# Patient Record
Sex: MALE | Race: WHITE | Employment: FULL TIME | URBAN - METROPOLITAN AREA
[De-identification: names, ages, dates, MRNs, and addresses within clinical notes are randomized per-mention and may not be internally consistent; named-entity substitution may affect disease eponyms.]

---

## 2017-02-10 ENCOUNTER — OFFICE VISIT (OUTPATIENT)
Dept: FAMILY MEDICINE CLINIC | Age: 22
End: 2017-02-10

## 2017-02-10 VITALS
HEIGHT: 68 IN | SYSTOLIC BLOOD PRESSURE: 128 MMHG | DIASTOLIC BLOOD PRESSURE: 72 MMHG | WEIGHT: 160 LBS | HEART RATE: 84 BPM | BODY MASS INDEX: 24.25 KG/M2

## 2017-02-10 DIAGNOSIS — J21.9 ACUTE BRONCHIOLITIS DUE TO UNSPECIFIED ORGANISM: Primary | ICD-10-CM

## 2017-02-10 PROCEDURE — 99213 OFFICE O/P EST LOW 20 MIN: CPT | Performed by: FAMILY MEDICINE

## 2017-02-10 RX ORDER — BENZONATATE 200 MG/1
200 CAPSULE ORAL EVERY 8 HOURS PRN
Qty: 30 CAPSULE | Refills: 0 | Status: SHIPPED | OUTPATIENT
Start: 2017-02-10 | End: 2017-02-17

## 2017-02-10 RX ORDER — AZITHROMYCIN 250 MG/1
TABLET, FILM COATED ORAL
Qty: 1 PACKET | Refills: 0 | Status: SHIPPED | OUTPATIENT
Start: 2017-02-10 | End: 2017-05-31

## 2017-02-10 RX ORDER — METHYLPREDNISOLONE 4 MG/1
TABLET ORAL
Qty: 1 KIT | Refills: 0 | Status: SHIPPED | OUTPATIENT
Start: 2017-02-10 | End: 2017-11-08

## 2017-05-30 LAB — S PYO AG THROAT QL: NORMAL

## 2017-05-31 ENCOUNTER — OFFICE VISIT (OUTPATIENT)
Dept: FAMILY MEDICINE CLINIC | Age: 22
End: 2017-05-31

## 2017-05-31 VITALS
HEIGHT: 69 IN | TEMPERATURE: 98.4 F | DIASTOLIC BLOOD PRESSURE: 72 MMHG | WEIGHT: 155 LBS | SYSTOLIC BLOOD PRESSURE: 121 MMHG | BODY MASS INDEX: 22.96 KG/M2 | HEART RATE: 80 BPM | RESPIRATION RATE: 20 BRPM

## 2017-05-31 DIAGNOSIS — J03.90 ACUTE TONSILLITIS, UNSPECIFIED ETIOLOGY: ICD-10-CM

## 2017-05-31 DIAGNOSIS — J02.9 SORE THROAT: Primary | ICD-10-CM

## 2017-05-31 PROCEDURE — 87880 STREP A ASSAY W/OPTIC: CPT | Performed by: FAMILY MEDICINE

## 2017-05-31 PROCEDURE — 99213 OFFICE O/P EST LOW 20 MIN: CPT | Performed by: FAMILY MEDICINE

## 2017-05-31 RX ORDER — AZITHROMYCIN 250 MG/1
TABLET, FILM COATED ORAL
Qty: 1 PACKET | Refills: 0 | Status: SHIPPED | OUTPATIENT
Start: 2017-05-31 | End: 2017-06-10

## 2017-05-31 ASSESSMENT — PATIENT HEALTH QUESTIONNAIRE - PHQ9
SUM OF ALL RESPONSES TO PHQ9 QUESTIONS 1 & 2: 0
1. LITTLE INTEREST OR PLEASURE IN DOING THINGS: 0
2. FEELING DOWN, DEPRESSED OR HOPELESS: 0
SUM OF ALL RESPONSES TO PHQ QUESTIONS 1-9: 0

## 2017-10-09 ENCOUNTER — OFFICE VISIT (OUTPATIENT)
Dept: FAMILY MEDICINE CLINIC | Age: 22
End: 2017-10-09

## 2017-10-09 VITALS
SYSTOLIC BLOOD PRESSURE: 120 MMHG | BODY MASS INDEX: 24.14 KG/M2 | DIASTOLIC BLOOD PRESSURE: 79 MMHG | HEIGHT: 69 IN | HEART RATE: 103 BPM | WEIGHT: 163 LBS | TEMPERATURE: 99.7 F

## 2017-10-09 DIAGNOSIS — J01.90 ACUTE BACTERIAL SINUSITIS: Primary | ICD-10-CM

## 2017-10-09 DIAGNOSIS — B96.89 ACUTE BACTERIAL SINUSITIS: Primary | ICD-10-CM

## 2017-10-09 PROCEDURE — 99213 OFFICE O/P EST LOW 20 MIN: CPT | Performed by: FAMILY MEDICINE

## 2017-10-09 RX ORDER — BENZONATATE 200 MG/1
200 CAPSULE ORAL 3 TIMES DAILY PRN
Qty: 20 CAPSULE | Refills: 0 | Status: SHIPPED | OUTPATIENT
Start: 2017-10-09 | End: 2017-10-16

## 2017-10-09 RX ORDER — METHYLPREDNISOLONE 4 MG/1
TABLET ORAL
Qty: 1 KIT | Refills: 0 | Status: SHIPPED | OUTPATIENT
Start: 2017-10-09 | End: 2017-11-08

## 2017-10-09 NOTE — PROGRESS NOTES
Chief Complaint   Patient presents with    Congestion    Cough    Headache       HPI: Corrine Hemphill presents for evaluation and management of 3 day h/o sinus pressure, chills, cough and fever. Called the service yesterday and got an rx for zithromax. Still not better. ROS: , no chest pain, no palpitation, no abd pain, no n/v/d/c,    No Known Allergies  New Prescriptions    METHYLPREDNISOLONE (MEDROL DOSEPACK) 4 MG TABLET    Take by mouth. Current Outpatient Prescriptions   Medication Sig Dispense Refill    methylPREDNISolone (MEDROL DOSEPACK) 4 MG tablet Take by mouth. 1 kit 0    methylPREDNISolone (MEDROL DOSEPACK) 4 MG tablet Take by mouth. 1 kit 0    cetirizine (ZYRTEC) 10 MG tablet Take 10 mg by mouth daily. No current facility-administered medications for this visit. Past Medical History:   Diagnosis Date    Allergic rhinitis, seasonal     Conjunctivitis, allergic          Objective   /79   Pulse 103   Temp 99.7 °F (37.6 °C) (Oral)   Ht 5' 8.5\" (1.74 m)   Wt 163 lb (73.9 kg)   BMI 24.42 kg/m²   Wt Readings from Last 3 Encounters:   10/09/17 163 lb (73.9 kg)   05/31/17 155 lb (70.3 kg)   02/10/17 160 lb (72.6 kg) (58 %, Z= 0.19)*     * Growth percentiles are based on CDC 2-20 Years data. WDWN in NAD  HEENT: NCAT, PERRL, TM's neg, Canals patent, Nares mod ertyehma and Patent, Op/OC: beefy red and swollen tonsils with good dentition  Lungs: CTAB BS Equal and Easy, no accessory muscle use  CV: RRR w/o M,R,G, PP2+, no edema      Assessment   Plan     1. Acute bacterial sinusitis  Will add medrol and recheck inb 1 week. - methylPREDNISolone (MEDROL DOSEPACK) 4 MG tablet; Take by mouth. Dispense: 1 kit; Refill: 0      RTC inb 1 week.

## 2017-11-08 ENCOUNTER — OFFICE VISIT (OUTPATIENT)
Dept: FAMILY MEDICINE CLINIC | Age: 22
End: 2017-11-08

## 2017-11-08 VITALS
HEART RATE: 118 BPM | WEIGHT: 160 LBS | SYSTOLIC BLOOD PRESSURE: 130 MMHG | TEMPERATURE: 101.9 F | HEIGHT: 69 IN | BODY MASS INDEX: 23.7 KG/M2 | DIASTOLIC BLOOD PRESSURE: 85 MMHG

## 2017-11-08 DIAGNOSIS — J02.0 STREP THROAT: Primary | ICD-10-CM

## 2017-11-08 PROCEDURE — G8420 CALC BMI NORM PARAMETERS: HCPCS | Performed by: FAMILY MEDICINE

## 2017-11-08 PROCEDURE — 1036F TOBACCO NON-USER: CPT | Performed by: FAMILY MEDICINE

## 2017-11-08 PROCEDURE — G8484 FLU IMMUNIZE NO ADMIN: HCPCS | Performed by: FAMILY MEDICINE

## 2017-11-08 PROCEDURE — 99213 OFFICE O/P EST LOW 20 MIN: CPT | Performed by: FAMILY MEDICINE

## 2017-11-08 PROCEDURE — G8427 DOCREV CUR MEDS BY ELIG CLIN: HCPCS | Performed by: FAMILY MEDICINE

## 2017-11-08 RX ORDER — AZITHROMYCIN 250 MG/1
TABLET, FILM COATED ORAL
Qty: 1 PACKET | Refills: 0 | Status: SHIPPED | OUTPATIENT
Start: 2017-11-08 | End: 2018-08-01 | Stop reason: SDUPTHER

## 2017-11-08 RX ORDER — METHYLPREDNISOLONE 4 MG/1
TABLET ORAL
Qty: 1 KIT | Refills: 0 | Status: SHIPPED | OUTPATIENT
Start: 2017-11-08 | End: 2018-08-31 | Stop reason: CLARIF

## 2017-11-08 NOTE — PROGRESS NOTES
Chief Complaint   Patient presents with    Cough    Fever       HPI: Carole Duggan presents for evaluation and management of 2 day h/o sore throat with fever to 101 and myalgia. Denies much cough or rash. ROS: +  chills, no cough, no sob, no chest pain, no palpitation, no abd pain, no n/v/d/c,    No Known Allergies  New Prescriptions    AZITHROMYCIN (ZITHROMAX) 250 MG TABLET    2 today then one a day for 4 more days. METHYLPREDNISOLONE (MEDROL DOSEPACK) 4 MG TABLET    Take by mouth. Current Outpatient Prescriptions   Medication Sig Dispense Refill    azithromycin (ZITHROMAX) 250 MG tablet 2 today then one a day for 4 more days. 1 packet 0    methylPREDNISolone (MEDROL DOSEPACK) 4 MG tablet Take by mouth. 1 kit 0    cetirizine (ZYRTEC) 10 MG tablet Take 10 mg by mouth daily. No current facility-administered medications for this visit. Past Medical History:   Diagnosis Date    Allergic rhinitis, seasonal     Conjunctivitis, allergic          Objective   /85   Pulse 118   Temp 101.9 °F (38.8 °C) (Oral)   Ht 5' 8.5\" (1.74 m)   Wt 160 lb (72.6 kg)   BMI 23.97 kg/m²   Wt Readings from Last 3 Encounters:   11/08/17 160 lb (72.6 kg)   10/09/17 163 lb (73.9 kg)   05/31/17 155 lb (70.3 kg)       WDWN in NAD  HEENT: NCAT, PERRL, TM's neg, Canals patent, Nares pink and Patent, Op/OC: beefy red pharynx with tonsillar swelling with good dentition  Lungs: CTAB BS Equal and Easy, no accessory muscle use  CV: RRR w/o M,R,G, PP2+, no edema      Assessment   Plan     1. Strep throat  Will tx with meds and follow up inb 1 week. - azithromycin (ZITHROMAX) 250 MG tablet; 2 today then one a day for 4 more days. Dispense: 1 packet; Refill: 0  - methylPREDNISolone (MEDROL DOSEPACK) 4 MG tablet; Take by mouth. Dispense: 1 kit;  Refill: 0      RTC prn

## 2018-08-01 ENCOUNTER — TELEPHONE (OUTPATIENT)
Dept: FAMILY MEDICINE CLINIC | Age: 23
End: 2018-08-01

## 2018-08-01 DIAGNOSIS — J02.0 STREP THROAT: ICD-10-CM

## 2018-08-01 RX ORDER — AZITHROMYCIN 250 MG/1
TABLET, FILM COATED ORAL
Qty: 1 PACKET | Refills: 0 | Status: SHIPPED | OUTPATIENT
Start: 2018-08-01 | End: 2018-08-31 | Stop reason: CLARIF

## 2018-08-01 NOTE — TELEPHONE ENCOUNTER
Please call: I sent in a Z-Luis Fernando for him to the pharmacy as requested.  If he is not better in 3-5 days he needs to make an appointment

## 2018-08-15 ENCOUNTER — TELEPHONE (OUTPATIENT)
Dept: FAMILY MEDICINE CLINIC | Age: 23
End: 2018-08-15

## 2018-08-15 RX ORDER — PREDNISONE 10 MG/1
TABLET ORAL
Qty: 30 TABLET | Refills: 0 | Status: SHIPPED | OUTPATIENT
Start: 2018-08-15 | End: 2018-08-25

## 2018-08-17 ENCOUNTER — TELEPHONE (OUTPATIENT)
Dept: FAMILY MEDICINE CLINIC | Age: 23
End: 2018-08-17

## 2018-08-27 ENCOUNTER — TELEPHONE (OUTPATIENT)
Dept: FAMILY MEDICINE CLINIC | Age: 23
End: 2018-08-27

## 2018-08-27 NOTE — TELEPHONE ENCOUNTER
The PT's father is calling in wanting to know sif he can be squeezed onto the schedule for Friday. \"At the moment it's blocked out\"    The Pt will be coming in from school \"[place]\" Friday and would be able to be here after 12:00pm    He is wanting to talk to the Dr about possible ADD/ADHD Issues.           Best call back [phone]--[phone]

## 2018-08-31 ENCOUNTER — OFFICE VISIT (OUTPATIENT)
Dept: FAMILY MEDICINE CLINIC | Age: 23
End: 2018-08-31

## 2018-08-31 VITALS
SYSTOLIC BLOOD PRESSURE: 102 MMHG | BODY MASS INDEX: 24.88 KG/M2 | WEIGHT: 168 LBS | HEIGHT: 69 IN | DIASTOLIC BLOOD PRESSURE: 66 MMHG | HEART RATE: 75 BPM

## 2018-08-31 DIAGNOSIS — F90.0 ATTENTION DEFICIT HYPERACTIVITY DISORDER (ADHD), PREDOMINANTLY INATTENTIVE TYPE: ICD-10-CM

## 2018-08-31 PROCEDURE — 1036F TOBACCO NON-USER: CPT | Performed by: FAMILY MEDICINE

## 2018-08-31 PROCEDURE — G8419 CALC BMI OUT NRM PARAM NOF/U: HCPCS | Performed by: FAMILY MEDICINE

## 2018-08-31 PROCEDURE — G8427 DOCREV CUR MEDS BY ELIG CLIN: HCPCS | Performed by: FAMILY MEDICINE

## 2018-08-31 PROCEDURE — 99213 OFFICE O/P EST LOW 20 MIN: CPT | Performed by: FAMILY MEDICINE

## 2018-08-31 RX ORDER — METHYLPHENIDATE HYDROCHLORIDE 10 MG/1
10 TABLET ORAL 2 TIMES DAILY
Qty: 60 TABLET | Refills: 0 | Status: SHIPPED | OUTPATIENT
Start: 2018-08-31 | End: 2018-09-24

## 2018-08-31 ASSESSMENT — ENCOUNTER SYMPTOMS
ABDOMINAL PAIN: 0
VOMITING: 0
DIARRHEA: 0
CONSTIPATION: 0
COUGH: 0
NAUSEA: 0
SHORTNESS OF BREATH: 0

## 2018-08-31 ASSESSMENT — PATIENT HEALTH QUESTIONNAIRE - PHQ9
SUM OF ALL RESPONSES TO PHQ9 QUESTIONS 1 & 2: 0
SUM OF ALL RESPONSES TO PHQ QUESTIONS 1-9: 0
2. FEELING DOWN, DEPRESSED OR HOPELESS: 0
1. LITTLE INTEREST OR PLEASURE IN DOING THINGS: 0
SUM OF ALL RESPONSES TO PHQ QUESTIONS 1-9: 0

## 2018-09-24 ENCOUNTER — OFFICE VISIT (OUTPATIENT)
Dept: FAMILY MEDICINE CLINIC | Age: 23
End: 2018-09-24
Payer: COMMERCIAL

## 2018-09-24 VITALS
SYSTOLIC BLOOD PRESSURE: 111 MMHG | HEART RATE: 78 BPM | DIASTOLIC BLOOD PRESSURE: 74 MMHG | WEIGHT: 165 LBS | HEIGHT: 68 IN | BODY MASS INDEX: 25.01 KG/M2

## 2018-09-24 DIAGNOSIS — F90.0 ATTENTION DEFICIT HYPERACTIVITY DISORDER (ADHD), PREDOMINANTLY INATTENTIVE TYPE: Primary | ICD-10-CM

## 2018-09-24 PROCEDURE — G8427 DOCREV CUR MEDS BY ELIG CLIN: HCPCS | Performed by: FAMILY MEDICINE

## 2018-09-24 PROCEDURE — G8419 CALC BMI OUT NRM PARAM NOF/U: HCPCS | Performed by: FAMILY MEDICINE

## 2018-09-24 PROCEDURE — 99213 OFFICE O/P EST LOW 20 MIN: CPT | Performed by: FAMILY MEDICINE

## 2018-09-24 PROCEDURE — 1036F TOBACCO NON-USER: CPT | Performed by: FAMILY MEDICINE

## 2018-09-24 RX ORDER — METHYLPHENIDATE HYDROCHLORIDE 20 MG/1
20 TABLET ORAL 2 TIMES DAILY
Qty: 60 TABLET | Refills: 0 | Status: SHIPPED | OUTPATIENT
Start: 2018-09-24 | End: 2018-11-05 | Stop reason: SDUPTHER

## 2018-09-24 ASSESSMENT — ENCOUNTER SYMPTOMS
ABDOMINAL PAIN: 0
SHORTNESS OF BREATH: 0
COUGH: 0
CONSTIPATION: 0
VOMITING: 0
DIARRHEA: 0
NAUSEA: 0

## 2018-09-24 NOTE — PROGRESS NOTES
Chief Complaint   Patient presents with    ADD         HPI:  Ronald Rose is a 26 y.o. (: 1995) here today   for follow up of his ADHD. He is  compliant with his ADD medications  with decreased appetite, . Medications are somewhat effective for his symptoms he still feels at times he is to extra hyper. He said that he doesn't feel as though the medication is effective if he is already over excited and hyper before taking the medication. He says that his school performance is improved though. He is working out so the weight loss he has had is intentional.      Review of Systems   Constitutional: Negative for chills and fever. Respiratory: Negative for cough and shortness of breath. Cardiovascular: Negative for chest pain and palpitations. Gastrointestinal: Negative for abdominal pain, constipation, diarrhea, nausea and vomiting. Endocrine: Negative for polyuria. Genitourinary: Negative for dysuria. Psychiatric/Behavioral: Negative for dysphoric mood, self-injury and suicidal ideas. The patient is not nervous/anxious.         Past Medical History:   Diagnosis Date    Allergic rhinitis, seasonal     Attention deficit hyperactivity disorder (ADHD), predominantly inattentive type 2018    Conjunctivitis, allergic      Family History   Problem Relation Age of Onset    No Known Problems Mother     Hypertension Father      Social History     Social History    Marital status: Single     Spouse name: N/A    Number of children: N/A    Years of education: N/A     Occupational History    Student - construction      Social History Main Topics    Smoking status: Never Smoker    Smokeless tobacco: Never Used      Comment: counseled on tobacco exposure avoidance    Alcohol use 0.0 oz/week      Comment: Rarely    Drug use: No    Sexual activity: No     Other Topics Concern    Not on file     Social History Narrative    No narrative on file       New Prescriptions    METHYLPHENIDATE

## 2018-11-05 DIAGNOSIS — F90.0 ATTENTION DEFICIT HYPERACTIVITY DISORDER (ADHD), PREDOMINANTLY INATTENTIVE TYPE: ICD-10-CM

## 2018-11-06 RX ORDER — METHYLPHENIDATE HYDROCHLORIDE 20 MG/1
20 TABLET ORAL 2 TIMES DAILY
Qty: 60 TABLET | Refills: 0 | Status: SHIPPED | OUTPATIENT
Start: 2018-11-06 | End: 2018-12-10 | Stop reason: SDUPTHER

## 2018-11-08 ENCOUNTER — OFFICE VISIT (OUTPATIENT)
Dept: FAMILY MEDICINE CLINIC | Age: 23
End: 2018-11-08
Payer: COMMERCIAL

## 2018-11-08 VITALS
BODY MASS INDEX: 25.31 KG/M2 | HEIGHT: 68 IN | WEIGHT: 167 LBS | HEART RATE: 79 BPM | DIASTOLIC BLOOD PRESSURE: 65 MMHG | SYSTOLIC BLOOD PRESSURE: 109 MMHG

## 2018-11-08 DIAGNOSIS — F90.0 ATTENTION DEFICIT HYPERACTIVITY DISORDER (ADHD), PREDOMINANTLY INATTENTIVE TYPE: ICD-10-CM

## 2018-11-08 PROCEDURE — G8419 CALC BMI OUT NRM PARAM NOF/U: HCPCS | Performed by: FAMILY MEDICINE

## 2018-11-08 PROCEDURE — 1036F TOBACCO NON-USER: CPT | Performed by: FAMILY MEDICINE

## 2018-11-08 PROCEDURE — 99213 OFFICE O/P EST LOW 20 MIN: CPT | Performed by: FAMILY MEDICINE

## 2018-11-08 PROCEDURE — G8427 DOCREV CUR MEDS BY ELIG CLIN: HCPCS | Performed by: FAMILY MEDICINE

## 2018-11-08 PROCEDURE — G8484 FLU IMMUNIZE NO ADMIN: HCPCS | Performed by: FAMILY MEDICINE

## 2018-11-08 ASSESSMENT — ENCOUNTER SYMPTOMS
COUGH: 0
DIARRHEA: 0
CONSTIPATION: 0
NAUSEA: 0
SHORTNESS OF BREATH: 0
ABDOMINAL PAIN: 0
VOMITING: 0

## 2018-11-08 NOTE — PROGRESS NOTES
Prescriptions    No medications on file         Meds Prior to visit:  Prior to Visit Medications    Medication Sig Taking? Authorizing Provider   methylphenidate (RITALIN) 20 MG tablet Take 1 tablet by mouth 2 times daily for 30 days. Tarah Calloway MD   cetirizine (ZYRTEC) 10 MG tablet Take 10 mg by mouth daily. Yes Historical Provider, MD     No Known Allergies    OBJECTIVE:    /65   Pulse 79   Ht 5' 8\" (1.727 m)   Wt 167 lb (75.8 kg)   BMI 25.39 kg/m²   BP Readings from Last 2 Encounters:   11/08/18 109/65   09/24/18 111/74     Wt Readings from Last 3 Encounters:   11/08/18 167 lb (75.8 kg)   09/24/18 165 lb (74.8 kg)   08/31/18 168 lb (76.2 kg)       Physical Exam   Constitutional: He appears well-developed and well-nourished. Cardiovascular: Normal rate and regular rhythm. Exam reveals no gallop and no friction rub. No murmur heard. Pulses:       Dorsalis pedis pulses are 2+ on the right side, and 2+ on the left side. Posterior tibial pulses are 2+ on the right side, and 2+ on the left side. No edema lower extremities     Pulmonary/Chest: Effort normal and breath sounds normal. He has no wheezes. He has no rales. Abdominal: Soft. Bowel sounds are normal. He exhibits no distension and no mass. There is no tenderness. Skin: Skin is warm and dry. No rash noted. Psychiatric: He has a normal mood and affect. His behavior is normal. Thought content normal.   Nursing note and vitals reviewed. No results found for: LABA1C, LABMICR, LDLCALC    ASSESSMENT/PLAN:    1. Attention deficit hyperactivity disorder (ADHD), predominantly inattentive type  Controlled: Appears stable. We will continue current management and monitor for adverse reaction and disease progression.   Follow-up as noted below        RTC 6 months and as needed    Scribe attestation:  Quincy Ro MA, am scribing for and in the presence of Obdulia Hernández MD. Electronically signed by Calin Marsh MA

## 2018-12-10 ENCOUNTER — TELEPHONE (OUTPATIENT)
Dept: FAMILY MEDICINE CLINIC | Age: 23
End: 2018-12-10

## 2018-12-10 DIAGNOSIS — F90.0 ATTENTION DEFICIT HYPERACTIVITY DISORDER (ADHD), PREDOMINANTLY INATTENTIVE TYPE: ICD-10-CM

## 2018-12-10 NOTE — TELEPHONE ENCOUNTER
Patient requesting a medication refill.   Medication  methylphenidate (RITALIN  Dosage  20 MG tablet   Frequency Take 1 tablet by mouth 2 times daily for 30 days  Last filled on  09/24/18  Pharmacy  [facility]

## 2018-12-11 RX ORDER — METHYLPHENIDATE HYDROCHLORIDE 20 MG/1
20 TABLET ORAL 2 TIMES DAILY
Qty: 60 TABLET | Refills: 0 | Status: SHIPPED | OUTPATIENT
Start: 2018-12-11 | End: 2019-01-10 | Stop reason: SDUPTHER

## 2018-12-26 ENCOUNTER — OFFICE VISIT (OUTPATIENT)
Dept: FAMILY MEDICINE CLINIC | Age: 23
End: 2018-12-26
Payer: COMMERCIAL

## 2018-12-26 VITALS
DIASTOLIC BLOOD PRESSURE: 79 MMHG | SYSTOLIC BLOOD PRESSURE: 124 MMHG | HEIGHT: 68 IN | WEIGHT: 171 LBS | BODY MASS INDEX: 25.91 KG/M2

## 2018-12-26 DIAGNOSIS — R61 DIAPHORESIS: ICD-10-CM

## 2018-12-26 DIAGNOSIS — J30.1 ALLERGIC RHINITIS DUE TO POLLEN, UNSPECIFIED SEASONALITY: ICD-10-CM

## 2018-12-26 DIAGNOSIS — F90.0 ATTENTION DEFICIT HYPERACTIVITY DISORDER (ADHD), PREDOMINANTLY INATTENTIVE TYPE: Primary | ICD-10-CM

## 2018-12-26 PROCEDURE — 99214 OFFICE O/P EST MOD 30 MIN: CPT | Performed by: FAMILY MEDICINE

## 2018-12-26 PROCEDURE — G8419 CALC BMI OUT NRM PARAM NOF/U: HCPCS | Performed by: FAMILY MEDICINE

## 2018-12-26 PROCEDURE — G8427 DOCREV CUR MEDS BY ELIG CLIN: HCPCS | Performed by: FAMILY MEDICINE

## 2018-12-26 PROCEDURE — 1036F TOBACCO NON-USER: CPT | Performed by: FAMILY MEDICINE

## 2018-12-26 PROCEDURE — G8484 FLU IMMUNIZE NO ADMIN: HCPCS | Performed by: FAMILY MEDICINE

## 2018-12-26 RX ORDER — CLONIDINE HYDROCHLORIDE 0.1 MG/1
0.1 TABLET ORAL 2 TIMES DAILY
Qty: 60 TABLET | Refills: 3 | Status: SHIPPED | OUTPATIENT
Start: 2018-12-26 | End: 2019-01-31 | Stop reason: SDUPTHER

## 2018-12-26 RX ORDER — FLUTICASONE PROPIONATE 50 MCG
1 SPRAY, SUSPENSION (ML) NASAL DAILY
Qty: 1 BOTTLE | Refills: 3 | Status: SHIPPED | OUTPATIENT
Start: 2018-12-26 | End: 2019-12-09

## 2018-12-26 RX ORDER — AZELASTINE 1 MG/ML
2 SPRAY, METERED NASAL 2 TIMES DAILY
Qty: 1 BOTTLE | Refills: 3 | Status: SHIPPED | OUTPATIENT
Start: 2018-12-26 | End: 2019-12-09

## 2018-12-26 ASSESSMENT — ENCOUNTER SYMPTOMS
SHORTNESS OF BREATH: 0
DIARRHEA: 0
ABDOMINAL PAIN: 0
CONSTIPATION: 0
NAUSEA: 0
VOMITING: 0
COUGH: 0

## 2018-12-26 NOTE — PROGRESS NOTES
extremities   Pulmonary/Chest: Effort normal and breath sounds normal. He has no wheezes. He has no rales. Abdominal: Soft. Bowel sounds are normal. He exhibits no distension and no mass. There is no tenderness. There is no rebound. Lymphadenopathy:     He has no cervical adenopathy. Skin: Skin is warm. No rash noted. No erythema. Psychiatric: He has a normal mood and affect. His behavior is normal. Judgment and thought content normal.           No results found for: LABA1C, LABMICR, LDLCALC    ASSESSMENT/PLAN:    1. Attention deficit hyperactivity disorder (ADHD), predominantly inattentive type  Will trial adding clonidine and recheck 1 month. - cloNIDine (CATAPRES) 0.1 MG tablet; Take 1 tablet by mouth 2 times daily  Dispense: 60 tablet; Refill: 3    2. Allergic rhinitis due to pollen, unspecified seasonality  Trial adding flonase and astelin and rechec k 1 month. - fluticasone (FLONASE) 50 MCG/ACT nasal spray; 1 spray by Nasal route daily  Dispense: 1 Bottle; Refill: 3  - azelastine (ASTELIN) 0.1 % nasal spray; 2 sprays by Nasal route 2 times daily Use in each nostril as directed  Dispense: 1 Bottle; Refill: 3    3. Diaphoresis  Will see if addition of clonidine help. RTC 1 month    Scribe attestation:  Orville Matt, am scribing for and in the presence of Harris Lechuga MD. Electronically signed by Rubén Vera MA on 12/26/2018 at 10:45 AM            Provider attestation: Milton Angel MD, personally performed the services scribed by the user listed above in my presence, and it is both accurate and complete. I agree with the ROS and Past Histories independently gathered by the clinical support staff and the remaining scribed note accurately describes my personal service to the patient.     [facility]    12/26/2018  11:03 AM

## 2018-12-31 ENCOUNTER — PATIENT MESSAGE (OUTPATIENT)
Dept: FAMILY MEDICINE CLINIC | Age: 23
End: 2018-12-31

## 2019-01-09 ENCOUNTER — TELEPHONE (OUTPATIENT)
Dept: PRIMARY CARE CLINIC | Age: 24
End: 2019-01-09

## 2019-01-09 DIAGNOSIS — F90.0 ATTENTION DEFICIT HYPERACTIVITY DISORDER (ADHD), PREDOMINANTLY INATTENTIVE TYPE: ICD-10-CM

## 2019-01-10 RX ORDER — AZITHROMYCIN 250 MG/1
TABLET, FILM COATED ORAL
Qty: 1 PACKET | Refills: 0 | Status: SHIPPED | OUTPATIENT
Start: 2019-01-10 | End: 2019-04-05 | Stop reason: ALTCHOICE

## 2019-01-10 RX ORDER — BENZONATATE 200 MG/1
200 CAPSULE ORAL 3 TIMES DAILY PRN
Qty: 20 CAPSULE | Refills: 0 | Status: SHIPPED | OUTPATIENT
Start: 2019-01-10 | End: 2019-01-17

## 2019-01-10 RX ORDER — METHYLPHENIDATE HYDROCHLORIDE 20 MG/1
20 TABLET ORAL 2 TIMES DAILY
Qty: 60 TABLET | Refills: 0 | Status: SHIPPED | OUTPATIENT
Start: 2019-01-10 | End: 2019-01-15 | Stop reason: SDUPTHER

## 2019-01-14 ENCOUNTER — TELEPHONE (OUTPATIENT)
Dept: PRIMARY CARE CLINIC | Age: 24
End: 2019-01-14

## 2019-01-14 DIAGNOSIS — F90.0 ATTENTION DEFICIT HYPERACTIVITY DISORDER (ADHD), PREDOMINANTLY INATTENTIVE TYPE: ICD-10-CM

## 2019-01-14 RX ORDER — METHYLPHENIDATE HYDROCHLORIDE 20 MG/1
20 TABLET ORAL 2 TIMES DAILY
Qty: 60 TABLET | Refills: 0 | OUTPATIENT
Start: 2019-01-14 | End: 2019-02-13

## 2019-01-15 ENCOUNTER — NURSE TRIAGE (OUTPATIENT)
Dept: OTHER | Facility: CLINIC | Age: 24
End: 2019-01-15

## 2019-01-15 ENCOUNTER — TELEPHONE (OUTPATIENT)
Dept: PRIMARY CARE CLINIC | Age: 24
End: 2019-01-15

## 2019-01-15 RX ORDER — METHYLPHENIDATE HYDROCHLORIDE 20 MG/1
20 TABLET ORAL 2 TIMES DAILY
Qty: 60 TABLET | Refills: 0 | Status: SHIPPED | OUTPATIENT
Start: 2019-01-15 | End: 2019-03-20 | Stop reason: SDUPTHER

## 2019-01-16 PROBLEM — R61 HYPERHIDROSIS: Status: ACTIVE | Noted: 2019-01-16

## 2019-01-31 DIAGNOSIS — F90.0 ATTENTION DEFICIT HYPERACTIVITY DISORDER (ADHD), PREDOMINANTLY INATTENTIVE TYPE: ICD-10-CM

## 2019-01-31 RX ORDER — CLONIDINE HYDROCHLORIDE 0.1 MG/1
0.1 TABLET ORAL 2 TIMES DAILY
Qty: 60 TABLET | Refills: 3 | OUTPATIENT
Start: 2019-01-31

## 2019-01-31 RX ORDER — CLONIDINE HYDROCHLORIDE 0.1 MG/1
0.1 TABLET ORAL 2 TIMES DAILY
Qty: 60 TABLET | Refills: 0 | Status: SHIPPED | OUTPATIENT
Start: 2019-01-31 | End: 2019-03-11 | Stop reason: SDUPTHER

## 2019-02-08 ENCOUNTER — OFFICE VISIT (OUTPATIENT)
Dept: PRIMARY CARE CLINIC | Age: 24
End: 2019-02-08
Payer: COMMERCIAL

## 2019-02-08 VITALS
DIASTOLIC BLOOD PRESSURE: 81 MMHG | WEIGHT: 166 LBS | HEART RATE: 88 BPM | SYSTOLIC BLOOD PRESSURE: 128 MMHG | BODY MASS INDEX: 24.59 KG/M2 | HEIGHT: 69 IN

## 2019-02-08 DIAGNOSIS — R61 HYPERHIDROSIS: ICD-10-CM

## 2019-02-08 DIAGNOSIS — R10.84 DIFFUSE ABDOMINAL PAIN: ICD-10-CM

## 2019-02-08 DIAGNOSIS — F90.0 ATTENTION DEFICIT HYPERACTIVITY DISORDER (ADHD), PREDOMINANTLY INATTENTIVE TYPE: Primary | ICD-10-CM

## 2019-02-08 DIAGNOSIS — J30.1 ALLERGIC RHINITIS DUE TO POLLEN, UNSPECIFIED SEASONALITY: ICD-10-CM

## 2019-02-08 PROCEDURE — G8484 FLU IMMUNIZE NO ADMIN: HCPCS | Performed by: FAMILY MEDICINE

## 2019-02-08 PROCEDURE — G8420 CALC BMI NORM PARAMETERS: HCPCS | Performed by: FAMILY MEDICINE

## 2019-02-08 PROCEDURE — G8427 DOCREV CUR MEDS BY ELIG CLIN: HCPCS | Performed by: FAMILY MEDICINE

## 2019-02-08 PROCEDURE — 1036F TOBACCO NON-USER: CPT | Performed by: FAMILY MEDICINE

## 2019-02-08 PROCEDURE — 99214 OFFICE O/P EST MOD 30 MIN: CPT | Performed by: FAMILY MEDICINE

## 2019-02-08 ASSESSMENT — ENCOUNTER SYMPTOMS
NAUSEA: 0
ABDOMINAL PAIN: 1
DIARRHEA: 0
CONSTIPATION: 1
COUGH: 0
SHORTNESS OF BREATH: 0
VOMITING: 0

## 2019-02-08 ASSESSMENT — PATIENT HEALTH QUESTIONNAIRE - PHQ9
2. FEELING DOWN, DEPRESSED OR HOPELESS: 0
SUM OF ALL RESPONSES TO PHQ9 QUESTIONS 1 & 2: 0
1. LITTLE INTEREST OR PLEASURE IN DOING THINGS: 0
SUM OF ALL RESPONSES TO PHQ QUESTIONS 1-9: 0
SUM OF ALL RESPONSES TO PHQ QUESTIONS 1-9: 0

## 2019-03-08 ENCOUNTER — TELEPHONE (OUTPATIENT)
Dept: FAMILY MEDICINE CLINIC | Age: 24
End: 2019-03-08

## 2019-03-08 DIAGNOSIS — F90.0 ATTENTION DEFICIT HYPERACTIVITY DISORDER (ADHD), PREDOMINANTLY INATTENTIVE TYPE: ICD-10-CM

## 2019-03-11 RX ORDER — CLONIDINE HYDROCHLORIDE 0.1 MG/1
0.1 TABLET ORAL 2 TIMES DAILY
Qty: 60 TABLET | Refills: 3 | Status: SHIPPED | OUTPATIENT
Start: 2019-03-11 | End: 2019-04-22 | Stop reason: SDUPTHER

## 2019-03-20 ENCOUNTER — TELEPHONE (OUTPATIENT)
Dept: PRIMARY CARE CLINIC | Age: 24
End: 2019-03-20

## 2019-03-20 DIAGNOSIS — F90.0 ATTENTION DEFICIT HYPERACTIVITY DISORDER (ADHD), PREDOMINANTLY INATTENTIVE TYPE: ICD-10-CM

## 2019-03-20 RX ORDER — METHYLPHENIDATE HYDROCHLORIDE 20 MG/1
20 TABLET ORAL 2 TIMES DAILY
Qty: 60 TABLET | Refills: 0 | Status: SHIPPED | OUTPATIENT
Start: 2019-03-20 | End: 2019-04-22 | Stop reason: SDUPTHER

## 2019-03-26 ENCOUNTER — HOSPITAL ENCOUNTER (EMERGENCY)
Age: 24
Discharge: HOME | End: 2019-03-26
Attending: FAMILY MEDICINE
Payer: COMMERCIAL

## 2019-03-26 VITALS
HEART RATE: 86 BPM | TEMPERATURE: 98 F | SYSTOLIC BLOOD PRESSURE: 134 MMHG | DIASTOLIC BLOOD PRESSURE: 73 MMHG | RESPIRATION RATE: 18 BRPM | WEIGHT: 165 LBS | HEIGHT: 69 IN | OXYGEN SATURATION: 98 % | BODY MASS INDEX: 24.44 KG/M2

## 2019-03-26 DIAGNOSIS — S61.412A LACERATION OF LEFT HAND WITHOUT FOREIGN BODY, INITIAL ENCOUNTER: Primary | ICD-10-CM

## 2019-03-26 PROCEDURE — 12001 RPR S/N/AX/GEN/TRNK 2.5CM/<: CPT

## 2019-03-26 PROCEDURE — 6370000000 HC RX 637 (ALT 250 FOR IP)

## 2019-03-26 PROCEDURE — 99282 EMERGENCY DEPT VISIT SF MDM: CPT

## 2019-03-26 PROCEDURE — 90715 TDAP VACCINE 7 YRS/> IM: CPT | Performed by: FAMILY MEDICINE

## 2019-03-26 PROCEDURE — 2709999900 HC NON-CHARGEABLE SUPPLY

## 2019-03-26 PROCEDURE — 90471 IMMUNIZATION ADMIN: CPT | Performed by: FAMILY MEDICINE

## 2019-03-26 PROCEDURE — 6360000002 HC RX W HCPCS: Performed by: FAMILY MEDICINE

## 2019-03-26 RX ORDER — MORPHINE SULFATE 4 MG/ML
INJECTION, SOLUTION INTRAMUSCULAR; INTRAVENOUS
Status: DISCONTINUED
Start: 2019-03-26 | End: 2019-03-26 | Stop reason: WASHOUT

## 2019-03-26 RX ORDER — LIDOCAINE HYDROCHLORIDE 10 MG/ML
INJECTION, SOLUTION INFILTRATION; PERINEURAL
Status: DISCONTINUED
Start: 2019-03-26 | End: 2019-03-27 | Stop reason: HOSPADM

## 2019-03-26 RX ORDER — GINSENG 100 MG
CAPSULE ORAL
Status: COMPLETED
Start: 2019-03-26 | End: 2019-03-26

## 2019-03-26 RX ADMIN — BACITRACIN: 500 OINTMENT TOPICAL at 21:48

## 2019-03-26 RX ADMIN — TETANUS TOXOID, REDUCED DIPHTHERIA TOXOID AND ACELLULAR PERTUSSIS VACCINE, ADSORBED 0.5 ML: 5; 2.5; 8; 8; 2.5 SUSPENSION INTRAMUSCULAR at 21:46

## 2019-03-26 ASSESSMENT — ENCOUNTER SYMPTOMS
EYE DISCHARGE: 0
WHEEZING: 0
ABDOMINAL PAIN: 0
EYE REDNESS: 0
COUGH: 0
BACK PAIN: 0
SHORTNESS OF BREATH: 0
VOMITING: 0
SORE THROAT: 0
DIARRHEA: 0
RHINORRHEA: 0
NAUSEA: 0

## 2019-03-26 ASSESSMENT — PAIN DESCRIPTION - LOCATION: LOCATION: FINGER (COMMENT WHICH ONE)

## 2019-03-26 ASSESSMENT — PAIN SCALES - GENERAL: PAINLEVEL_OUTOF10: 7

## 2019-03-26 ASSESSMENT — PAIN DESCRIPTION - DESCRIPTORS: DESCRIPTORS: THROBBING

## 2019-03-26 ASSESSMENT — PAIN DESCRIPTION - PAIN TYPE: TYPE: ACUTE PAIN

## 2019-03-27 NOTE — ED PROVIDER NOTES
[facility]  eMERGENCY dEPARTMENT eNCOUnter          CHIEF COMPLAINT       Chief Complaint   Patient presents with    Laceration       Nurses Notes reviewed and I agree except as noted in the HPI. HISTORY OF PRESENT ILLNESS    Fausto Wang is a 26 y.o. male who presents to the Emergency Department for the evaluation of laceration to the crease of his left hand between his thumb and second finger. Patient states that he was using a knife trying to cut a zip tie when he wasn't paying attention and cut himself. Last tetanus shot is unknown. Patient denies numbness, tingling, weakness, or decrease range of motion. No further complaints at initial evaluation. The HPI was provided by the patient. REVIEW OF SYSTEMS     Review of Systems   Constitutional: Negative for appetite change, chills, fatigue and fever. HENT: Negative for congestion, ear pain, rhinorrhea and sore throat. Eyes: Negative for discharge, redness and visual disturbance. Respiratory: Negative for cough, shortness of breath and wheezing. Cardiovascular: Negative for chest pain, palpitations and leg swelling. Gastrointestinal: Negative for abdominal pain, diarrhea, nausea and vomiting. Genitourinary: Negative for decreased urine volume, difficulty urinating, dysuria and hematuria. Musculoskeletal: Negative for arthralgias, back pain, joint swelling and neck pain. Skin: Positive for wound (laceration). Negative for pallor and rash. Allergic/Immunologic: Negative for environmental allergies. Neurological: Negative for dizziness, syncope, weakness, light-headedness, numbness and headaches. Hematological: Negative for adenopathy. Psychiatric/Behavioral: Negative for confusion and suicidal ideas. The patient is not nervous/anxious.         PAST MEDICALHISTORY    has a past medical history of Allergic rhinitis, seasonal, Attention deficit hyperactivity disorder (ADHD), predominantly inattentive type, and Conjunctivitis, allergic. SURGICAL HISTORY    has no past surgical history on file. CURRENT MEDICATIONS       Discharge Medication List as of 3/26/2019 10:01 PM      CONTINUE these medications which have NOT CHANGED    Details   methylphenidate (RITALIN) 20 MG tablet Take 1 tablet by mouth 2 times daily for 30 days. , Disp-60 tablet, R-0Normal      cloNIDine (CATAPRES) 0.1 MG tablet Take 1 tablet by mouth 2 times daily, Disp-60 tablet, R-3Normal      aluminum chloride (DRYSOL) 20 % external solution Apply topically nightly., Disp-60 mL, R-1, Normal      azithromycin (ZITHROMAX) 250 MG tablet 2 today then one a day for 4 more days. , Disp-1 packet, R-0Normal      fluticasone (FLONASE) 50 MCG/ACT nasal spray 1 spray by Nasal route daily, Disp-1 Bottle, R-3Normal      azelastine (ASTELIN) 0.1 % nasal spray 2 sprays by Nasal route 2 times daily Use in each nostril as directed, Disp-1 Bottle, R-3Normal      cetirizine (ZYRTEC) 10 MG tablet Take 10 mg by mouth daily. ALLERGIES     has No Known Allergies. FAMILY HISTORY     indicated that his mother is alive. He indicated that his father is alive. family history includes Hypertension in his father; No Known Problems in his mother. SOCIAL HISTORY      reports that he has never smoked. He has never used smokeless tobacco. He reports that he drinks alcohol. He reports that he does not use drugs. PHYSICAL EXAM     INITIAL VITALS:  height is 5' 9\" (1.753 m) and weight is 165 lb (74.8 kg). His oral temperature is 98 °F (36.7 °C). His blood pressure is 134/73 and his pulse is 86. His respiration is 18 and oxygen saturation is 98%. Physical Exam   Constitutional: He is oriented to person, place, and time. He appears well-developed and well-nourished. HENT:   Head: Normocephalic and atraumatic. Right Ear: External ear normal.   Left Ear: External ear normal.   Eyes: Conjunctivae are normal. Right eye exhibits no discharge.  Left eye exhibits no discharge. No scleral icterus. Neck: Normal range of motion. Neck supple. No JVD present. Pulmonary/Chest: Effort normal. No stridor. No respiratory distress. Abdominal: Soft. He exhibits no distension. Musculoskeletal: Normal range of motion. He exhibits no edema. Neurological: He is alert and oriented to person, place, and time. He exhibits normal muscle tone. GCS eye subscore is 4. GCS verbal subscore is 5. GCS motor subscore is 6. Skin: Skin is warm and dry. He is not diaphoretic. No erythema. 2cm length with 5mm depth laceration at the crease of the left hand between thumb and second finger. Full range of motion of the thumb and second finger. Psychiatric: He has a normal mood and affect. His behavior is normal.   Nursing note and vitals reviewed. DIFFERENTIALDIAGNOSIS:   laceration    DIAGNOSTIC RESULTS     EKG: All EKG's are interpreted by the Emergency Department Physician who either signs or Co-signs this chart in the absence of a cardiologist.  EKG interpreted by Chesley Romberg, MD:    None    RADIOLOGY: non-plain film images(s) such as CT, Ultrasound and MRI are read by the radiologist.    No orders to display       LABS:   Labs Reviewed - No data to display    EMERGENCY DEPARTMENT COURSE:   Vitals:    Vitals:    03/26/19 2025   BP: 134/73   Pulse: 86   Resp: 18   Temp: 98 °F (36.7 °C)   TempSrc: Oral   SpO2: 98%   Weight: 165 lb (74.8 kg)   Height: 5' 9\" (1.753 m)       8:43 PM: The patient was seen and evaluated in a timely fashion. MDM:  The patient was seen within the ED today for the evaluation of laceration to left hand. The patient arrived in no acute distress and in stable condition. Within the department, I observed the patient's vital signs to be within acceptable range. On exam, I appreciated 2cm length with 5mm depth laceration at the crease of the left hand between thumb and second finger. Full range of motion of the thumb and second finger.  Within the department the patient had a tetanus booster and sutures to fix his laceration. I explained my proposed course of treatment to the patient, who was amenable to my decision, and I answered all questions that were asked. He was discharged home in stable condition, and the patient will return to the ED if his symptoms become more severe in nature or otherwise change. I advised the patient to follow-up with PCP. I also discussed return to ED precautions with the patient who verbalized understanding. CRITICAL CARE:   None     CONSULTS:  None    PROCEDURES:  Lac Repair  Date/Time: 3/26/2019 8:46 PM  Performed by: Neli Albright MD  Authorized by: Neli Albright MD     Consent:     Consent obtained:  Verbal    Consent given by:  Patient    Risks discussed:  Infection and pain    Alternatives discussed:  No treatment  Anesthesia (see MAR for exact dosages): Anesthesia method:  Local infiltration    Local anesthetic:  Lidocaine 1% w/o epi  Laceration details:     Location:  Hand    Hand location:  L palm    Length (cm):  2    Depth (mm):  5  Repair type:     Repair type:  Simple  Pre-procedure details:     Preparation:  Patient was prepped and draped in usual sterile fashion  Exploration:     Hemostasis achieved with:  Direct pressure    Wound exploration: wound explored through full range of motion      Wound extent: no tendon damage noted      Contaminated: no    Treatment:     Area cleansed with:  J Luis    Amount of cleaning:  Standard    Irrigation solution:  Sterile saline    Irrigation method:  Pressure wash  Skin repair:     Repair method:  Sutures    Suture size:  5-0    Suture material:  Prolene    Suture technique:  Simple interrupted    Number of sutures:  6  Approximation:     Approximation:  Close    Vermilion border: well-aligned    Post-procedure details:     Dressing:  Open (no dressing)    Patient tolerance of procedure:   Tolerated well, no immediate complications         FINAL IMPRESSION      1. Laceration of left hand without foreign body, initial encounter          DISPOSITION/PLAN   Discharge    PATIENT REFERRED TO:  Gianfranco Covarrubias MD  [address] [place] [address] Johana Mchugh  [phone]    Schedule an appointment as soon as possible for a visit in 1 week  For suture removal      DISCHARGE MEDICATIONS:  Discharge Medication List as of 3/26/2019 10:01 PM          (Please note that portions of this note were completed with a voice recognition program.Efforts were made to edit thedictations but occasionally words are mis-transcribed.)    Scribe:  Martinez Dutton 3/26/19 8:43 PM Scribing forand in the presence Manju Mathis MD.    Signed by: Josette Rehman, 03/27/19 8:50 PM    Provider:  I personally performed the services described in the documentation, reviewed and edited the documentation which was dictated to the scribe in my presence, and it accurately records mywords and actions.     Dhaval Clark MD 3/26/19 8:50 PM                  Dhaval Clark MD  03/27/19 2050

## 2019-03-27 NOTE — ED NOTES
Bacitracin applied to laceration and dressed per verbal order.        [address] [place] [address]  03/26/19 1994

## 2019-03-27 NOTE — ED NOTES
Patient presents to the ed with a laceration to the crease on his hand between his thumb and the second finger. States that he was using a pocket knife and he accidentally cut himself. Last tetanus shot unknown at this time.       [place]laudia [place], [place]  08/06/16 2252

## 2019-04-05 ENCOUNTER — OFFICE VISIT (OUTPATIENT)
Dept: PRIMARY CARE CLINIC | Age: 24
End: 2019-04-05
Payer: COMMERCIAL

## 2019-04-05 VITALS
SYSTOLIC BLOOD PRESSURE: 121 MMHG | OXYGEN SATURATION: 99 % | DIASTOLIC BLOOD PRESSURE: 80 MMHG | HEART RATE: 79 BPM | WEIGHT: 164 LBS | TEMPERATURE: 98.3 F | BODY MASS INDEX: 24.22 KG/M2

## 2019-04-05 DIAGNOSIS — S61.412A LACERATION OF LEFT HAND WITHOUT FOREIGN BODY, INITIAL ENCOUNTER: Primary | ICD-10-CM

## 2019-04-05 PROCEDURE — G8420 CALC BMI NORM PARAMETERS: HCPCS | Performed by: FAMILY MEDICINE

## 2019-04-05 PROCEDURE — 99213 OFFICE O/P EST LOW 20 MIN: CPT | Performed by: FAMILY MEDICINE

## 2019-04-05 PROCEDURE — G8427 DOCREV CUR MEDS BY ELIG CLIN: HCPCS | Performed by: FAMILY MEDICINE

## 2019-04-05 PROCEDURE — 1036F TOBACCO NON-USER: CPT | Performed by: FAMILY MEDICINE

## 2019-04-05 ASSESSMENT — ENCOUNTER SYMPTOMS
DIARRHEA: 0
NAUSEA: 0
SHORTNESS OF BREATH: 0
VOMITING: 0
COUGH: 0
ABDOMINAL PAIN: 0
CONSTIPATION: 0

## 2019-04-05 NOTE — PROGRESS NOTES
Chief Complaint   Patient presents with    Suture / Staple Removal         HPI:  Jyoti Alcantara is a 26 y.o. (:1995) here today for possible suture removal from his left hand. He was seen in the ER on 2019 after trying to cut a zip tie. He says that he wasn't paying attention and he cut himself. He had sutures placed and a tetanus vaccine given while in the ER and was told follow up for removal of sutures. He does note that his fingers especially his first and second finger are cold and he says that there have been times when his fingers are actually blue. He says that his hand feels numb. Review of Systems   Constitutional: Negative for chills and fever. Respiratory: Negative for cough and shortness of breath. Cardiovascular: Negative for chest pain and palpitations. Gastrointestinal: Negative for abdominal pain, constipation, diarrhea, nausea and vomiting. Endocrine: Negative for polyuria. Genitourinary: Negative for dysuria.        Past Medical History:   Diagnosis Date    Allergic rhinitis, seasonal     Attention deficit hyperactivity disorder (ADHD), predominantly inattentive type 2018    Conjunctivitis, allergic      Family History   Problem Relation Age of Onset    No Known Problems Mother     Hypertension Father      Social History     Socioeconomic History    Marital status: Single     Spouse name: Not on file    Number of children: Not on file    Years of education: Not on file    Highest education level: Not on file   Occupational History    Occupation: Student - [employer]   Social Needs    Financial resource strain: Not on file    Food insecurity:     Worry: Not on file     Inability: Not on file    Transportation needs:     Medical: Not on file     Non-medical: Not on file   Tobacco Use    Smoking status: Never Smoker    Smokeless tobacco: Never Used    Tobacco comment: counseled on tobacco exposure avoidance   Substance and Sexual Activity    Alcohol use: Yes     Alcohol/week: 0.0 oz     Types: 5 Cans of beer, 3 Shots of liquor per week     Comment: Doesnt happen every week    Drug use: No    Sexual activity: Never   Lifestyle    Physical activity:     Days per week: Not on file     Minutes per session: Not on file    Stress: Not on file   Relationships    Social connections:     Talks on phone: Not on file     Gets together: Not on file     Attends Jain service: Not on file     Active member of club or organization: Not on file     Attends meetings of clubs or organizations: Not on file     Relationship status: Not on file    Intimate partner violence:     Fear of current or ex partner: Not on file     Emotionally abused: Not on file     Physically abused: Not on file     Forced sexual activity: Not on file   Other Topics Concern    Not on file   Social History Narrative    Not on file       New Prescriptions    No medications on file         Meds Prior to visit:  Prior to Visit Medications    Medication Sig Taking? Authorizing Provider   methylphenidate (RITALIN) 20 MG tablet Take 1 tablet by mouth 2 times daily for 30 days. Yes Loreta Perea MD   cloNIDine (CATAPRES) 0.1 MG tablet Take 1 tablet by mouth 2 times daily Yes Loreta Perea MD   aluminum chloride (DRYSOL) 20 % external solution Apply topically nightly. Yes Loreta Perea MD   fluticasone [facility]) 50 MCG/ACT nasal spray 1 spray by Nasal route daily Yes Loreta Perea MD   azelastine (ASTELIN) 0.1 % nasal spray 2 sprays by Nasal route 2 times daily Use in each nostril as directed Yes Loreta Perea MD   cetirizine (ZYRTEC) 10 MG tablet Take 10 mg by mouth daily.    Yes Historical Provider, MD     No Known Allergies    OBJECTIVE:    /80 (Site: Left Upper Arm, Position: Sitting, Cuff Size: Medium Adult)   Pulse 79   Temp 98.3 °F (36.8 °C) (Oral)   Wt 164 lb (74.4 kg)   SpO2 99%   BMI 24.22 kg/m²   BP Readings from Last 2 Encounters: 04/05/19 121/80   03/26/19 134/73     Wt Readings from Last 3 Encounters:   04/05/19 164 lb (74.4 kg)   03/26/19 165 lb (74.8 kg)   02/08/19 166 lb (75.3 kg)       Physical Exam   Abdominal: Distention: ttest.   Skin:   2 cm laceration between thumb and index finger of left hand with desiccation and wound dehiscence. 6 sutures remain intact. There is no inflammation and no discharge           No results found for: LABA1C, LABMICR, LDLCALC    ASSESSMENT/PLAN:    1. Laceration of left hand without foreign body, initial encounter  With wound dehiscence. We applied Steri-Strips and securely fastened them with additional adhesive. Counseled patient to avoid getting his wound dirty over the weekend. And when those Steri-Strips begin to come off he can remove them along the line of his laceration. Also patient to monitor for signs of wound infection. Recheck as needed  -  - CA REMOVAL OF SUTURES      RTC when necessary    Scribe attestation:  Rosa Rittre MA, am scribing for and in the presence of Vita Locke MD. Electronically signed by Donnie Rice MA on 4/5/2019 at 11:04 AM            Provider attestation: Juni Ratliff MD, personally performed the services scribed by the user listed above in my presence, and it is both accurate and complete. I agree with the ROS and Past Histories independently gathered by the clinical support staff and the remaining scribed note accurately describes my personal service to the patient.     [facility]    4/5/2019  11:28 AM

## 2019-04-20 ENCOUNTER — PATIENT MESSAGE (OUTPATIENT)
Dept: PRIMARY CARE CLINIC | Age: 24
End: 2019-04-20

## 2019-04-20 DIAGNOSIS — F90.0 ATTENTION DEFICIT HYPERACTIVITY DISORDER (ADHD), PREDOMINANTLY INATTENTIVE TYPE: ICD-10-CM

## 2019-04-20 RX ORDER — CLONIDINE HYDROCHLORIDE 0.1 MG/1
0.1 TABLET ORAL 2 TIMES DAILY
Qty: 60 TABLET | Refills: 3 | Status: CANCELLED | OUTPATIENT
Start: 2019-04-20

## 2019-04-22 ENCOUNTER — TELEPHONE (OUTPATIENT)
Dept: PRIMARY CARE CLINIC | Age: 24
End: 2019-04-22

## 2019-04-22 RX ORDER — METHYLPHENIDATE HYDROCHLORIDE 20 MG/1
20 TABLET ORAL 2 TIMES DAILY
Qty: 60 TABLET | Refills: 0 | Status: SHIPPED | OUTPATIENT
Start: 2019-04-22 | End: 2019-06-03 | Stop reason: SDUPTHER

## 2019-04-22 RX ORDER — CLONIDINE HYDROCHLORIDE 0.1 MG/1
0.1 TABLET ORAL 2 TIMES DAILY
Qty: 60 TABLET | Refills: 3 | Status: SHIPPED | OUTPATIENT
Start: 2019-04-22 | End: 2019-06-03 | Stop reason: SDUPTHER

## 2019-04-22 NOTE — TELEPHONE ENCOUNTER
PT called needs a refill on RX           cloNIDine (CATAPRES) 0.1 MG tablet          Also asking why he no longer has a RX for his addarall. PT is asking that we give him a call asap.

## 2019-04-22 NOTE — TELEPHONE ENCOUNTER
From: Ban Ayala  To: Haseeb Cedillo MD  Sent: 4/20/2019 3:28 PM EDT  Subject: Markos Galindo, I requested a refill on the clonidine since it was out and Im about to be out of the ridilin so I was gonna request it as well but it isnt on my list of medications anymore. Is there an issue? And this is my request for that medication as well.  Thanks

## 2019-05-08 ENCOUNTER — PATIENT MESSAGE (OUTPATIENT)
Dept: PRIMARY CARE CLINIC | Age: 24
End: 2019-05-08

## 2019-05-09 ENCOUNTER — PATIENT MESSAGE (OUTPATIENT)
Dept: PRIMARY CARE CLINIC | Age: 24
End: 2019-05-09

## 2019-05-09 NOTE — TELEPHONE ENCOUNTER
From: Michelle Ramos  To: Shaye Goss MD  Sent: 5/9/2019 12:51 PM EDT  Subject: Non-Urgent Medical Question    My roommate goes and donates and said I should go with him sometime. I asked my parents and they didnt know if it was safe or not so they told me to ask you.   ----- Message -----  From: Gail Locke MD  Sent: 5/9/2019 12:38 PM EDT  To: Michelle Ramos  Subject: RE: Non-Urgent Medical Question  Richard,    Yes it is safe, As long as you are healthy, I think you could do it every couple of weeks. They will have guidance that may suggest more often. My question is, why are you selling your plasma? Thanks  Gail Locke MD      ----- Message -----   From: Michelle Ramos   Sent: 5/8/2019 10:11 PM EDT   To: Gail Locke MD  Subject: Non-Urgent Medical Question    Hello, quick question for you. There is a place here in [address] [place] [address] where I go to school that is called [facility] and you can go and donate plasma. Is it safe? And how often would I be able to donate if so?

## 2019-05-10 NOTE — TELEPHONE ENCOUNTER
From: Mel Tillman  To: Claudetta Free, MD  Sent: 5/9/2019 8:59 PM EDT  Subject: Non-Urgent Medical Question    Alright well thank you!  ----- Message -----  From: Ney Acosta MD  Sent: 5/9/2019 8:59 PM EDT  To: Mel Tillman  Subject: RE: Non-Urgent Medical Question  The process is safe, I do not know about the specific clinic. Ney Acosta MD      ----- Message -----   From: Mel Tillman   Sent: 5/9/2019 12:51 PM EDT   To: Ney Acosta MD  Subject: Non-Urgent Medical Question    My roommate goes and donates and said I should go with him sometime. I asked my parents and they didnt know if it was safe or not so they told me to ask you.   ----- Message -----  From: Ney Acosta MD  Sent: 5/9/2019 12:38 PM EDT  To: Mel Tillman  Subject: RE: Non-Urgent Medical Question  Richard,    Yes it is safe, As long as you are healthy, I think you could do it every couple of weeks. They will have guidance that may suggest more often. My question is, why are you selling your plasma? Thanks  Ney Acosta MD      ----- Message -----   From: Mel Tillman   Sent: 5/8/2019 10:11 PM EDT   To: Ney Acosta MD  Subject: Non-Urgent Medical Question    Hello, quick question for you. There is a place here in [place], [place] where I go to school that is called [facility] and you can go and donate plasma. Is it safe? And how often would I be able to donate if so?

## 2019-05-22 ENCOUNTER — HOSPITAL ENCOUNTER (EMERGENCY)
Age: 24
Discharge: HOME | End: 2019-05-22
Payer: COMMERCIAL

## 2019-05-22 VITALS
RESPIRATION RATE: 18 BRPM | DIASTOLIC BLOOD PRESSURE: 76 MMHG | HEART RATE: 96 BPM | TEMPERATURE: 98.9 F | OXYGEN SATURATION: 98 % | BODY MASS INDEX: 24.44 KG/M2 | WEIGHT: 165 LBS | HEIGHT: 69 IN | SYSTOLIC BLOOD PRESSURE: 128 MMHG

## 2019-05-22 DIAGNOSIS — B96.89 ACUTE BACTERIAL SINUSITIS: Primary | ICD-10-CM

## 2019-05-22 DIAGNOSIS — J01.90 ACUTE BACTERIAL SINUSITIS: Primary | ICD-10-CM

## 2019-05-22 PROCEDURE — 99213 OFFICE O/P EST LOW 20 MIN: CPT | Performed by: NURSE PRACTITIONER

## 2019-05-22 PROCEDURE — 99212 OFFICE O/P EST SF 10 MIN: CPT

## 2019-05-22 RX ORDER — PREDNISONE 20 MG/1
40 TABLET ORAL DAILY
Qty: 10 TABLET | Refills: 0 | Status: SHIPPED | OUTPATIENT
Start: 2019-05-22 | End: 2019-05-27

## 2019-05-22 RX ORDER — AMOXICILLIN AND CLAVULANATE POTASSIUM 875; 125 MG/1; MG/1
1 TABLET, FILM COATED ORAL 2 TIMES DAILY
Qty: 20 TABLET | Refills: 0 | Status: SHIPPED | OUTPATIENT
Start: 2019-05-22 | End: 2019-06-01

## 2019-05-22 ASSESSMENT — PAIN DESCRIPTION - DESCRIPTORS: DESCRIPTORS: ACHING

## 2019-05-22 ASSESSMENT — ENCOUNTER SYMPTOMS
SHORTNESS OF BREATH: 0
VOMITING: 0
SINUS PRESSURE: 1
NAUSEA: 0
SORE THROAT: 1
COUGH: 1

## 2019-05-22 ASSESSMENT — PAIN DESCRIPTION - LOCATION: LOCATION: HEAD

## 2019-05-22 NOTE — ED PROVIDER NOTES
[address]  Urgent Care Encounter       CHIEF COMPLAINT       Chief Complaint   Patient presents with    Sinusitis       Nurses Notes reviewed and I agree except as noted in the HPI. HISTORY OF PRESENT ILLNESS   Fausto Wang is a 27 y.o. male who presents with complaints of a sinus infection. Patient reports 2 week history of sinus pressure, nasal congestion and body aches. Patient also reports thick yellow nasal discharge, ear fullness and sore throat. He also reports occasional positional dizziness which clears quickly. He did have a fever last night up to 100.6°F that responded to Tylenol. No fevers today. He also reports occasional headaches. He does have a history of seasonal allergies. He has taken over-the-counter medications with some relief. The history is provided by the patient. REVIEW OF SYSTEMS     Review of Systems   Constitutional: Positive for fever. Negative for chills. HENT: Positive for congestion, ear pain (Fullness), postnasal drip, sinus pressure and sore throat. Eyes: Negative for visual disturbance. Respiratory: Positive for cough. Negative for shortness of breath. Cardiovascular: Negative for chest pain. Gastrointestinal: Negative for nausea and vomiting. Genitourinary: Negative for dysuria. Skin: Negative for rash. Allergic/Immunologic: Positive for environmental allergies. Neurological: Positive for headaches. PAST MEDICAL HISTORY         Diagnosis Date    Allergic rhinitis, seasonal     Attention deficit hyperactivity disorder (ADHD), predominantly inattentive type 8/31/2018    Conjunctivitis, allergic        SURGICALHISTORY     Patient  has no past surgical history on file.     CURRENT MEDICATIONS       Discharge Medication List as of 5/22/2019  3:14 PM      CONTINUE these medications which have NOT CHANGED    Details   Pseudoephedrine-APAP-DM (DAYQUIL MULTI-SYMPTOM COLD/FLU PO) Take by mouthHistorical Med well-developed and well-nourished. He is cooperative. He does not appear ill. No distress. HENT:   Head: Normocephalic and atraumatic. Right Ear: Hearing, tympanic membrane and ear canal normal.   Left Ear: Hearing, tympanic membrane and ear canal normal.   Nose: Right sinus exhibits maxillary sinus tenderness and frontal sinus tenderness. Left sinus exhibits maxillary sinus tenderness and frontal sinus tenderness. Mouth/Throat: Uvula is midline and mucous membranes are normal. Posterior oropharyngeal erythema present. Eyes: Conjunctivae and lids are normal. No scleral icterus. Pupils are equal.   Cardiovascular: Normal rate, regular rhythm, S1 normal, S2 normal and normal heart sounds. No pedal edema   Pulmonary/Chest: Effort normal and breath sounds normal. No respiratory distress. Musculoskeletal:   Normal active ROM x 4 extremities  Gait steady   Neurological: He is alert and oriented to person, place, and time. No sensory deficit. Answers questions readily and appropriately   Skin: Skin is warm and dry. No rash (to exposed skin) noted. No cyanosis. Nails show no clubbing. Psychiatric: He has a normal mood and affect. His speech is normal and behavior is normal.   Nursing note and vitals reviewed. DIAGNOSTIC RESULTS     Labs:No results found for this visit on 05/22/19. IMAGING:    No orders to display         EKG:      URGENT CARE COURSE:     Vitals:    05/22/19 1342   BP: 128/76   Pulse: 96   Resp: 18   Temp: 98.9 °F (37.2 °C)   TempSrc: Temporal   SpO2: 98%   Weight: 165 lb (74.8 kg)   Height: 5' 9\" (1.753 m)       Medications - No data to display         PROCEDURES:  None    FINAL IMPRESSION      1. Acute bacterial sinusitis          DISPOSITION/ PLAN     Plenty of fluids orally. Salt water gargles as needed for sore throat or OTC throat spray/lozenges. Cool mist humidifier at bedside for congestion. Saline rinses as needed for nasal congestion.   May take Tylenol and/or Ibuprofen

## 2019-05-22 NOTE — ED NOTES
Pt discharge teaching taught via teach back method. Talked with pt about medications and self care. No other concerns voiced. Pt ambulated to leave, rr easy and unlabored.      Torin Witt, RN  05/22/19 [id number]

## 2019-05-22 NOTE — ED TRIAGE NOTES
Patient states he has had sinus pressure and generalized body aches for the last 4 days. Patient states he has thick yellow mucus with coughing and blowing his nose.

## 2019-06-03 DIAGNOSIS — F90.0 ATTENTION DEFICIT HYPERACTIVITY DISORDER (ADHD), PREDOMINANTLY INATTENTIVE TYPE: ICD-10-CM

## 2019-06-03 RX ORDER — CLONIDINE HYDROCHLORIDE 0.1 MG/1
0.1 TABLET ORAL 2 TIMES DAILY
Qty: 60 TABLET | Refills: 3 | Status: SHIPPED | OUTPATIENT
Start: 2019-06-03 | End: 2019-07-16 | Stop reason: SDUPTHER

## 2019-06-03 RX ORDER — METHYLPHENIDATE HYDROCHLORIDE 20 MG/1
20 TABLET ORAL 2 TIMES DAILY
Qty: 60 TABLET | Refills: 0 | Status: SHIPPED | OUTPATIENT
Start: 2019-06-03 | End: 2019-07-03

## 2019-07-16 DIAGNOSIS — F90.0 ATTENTION DEFICIT HYPERACTIVITY DISORDER (ADHD), PREDOMINANTLY INATTENTIVE TYPE: ICD-10-CM

## 2019-07-16 RX ORDER — METHYLPHENIDATE HYDROCHLORIDE 20 MG/1
20 TABLET ORAL 2 TIMES DAILY
Qty: 60 TABLET | Refills: 0 | Status: SHIPPED | OUTPATIENT
Start: 2019-07-16 | End: 2019-08-15

## 2019-07-16 RX ORDER — CLONIDINE HYDROCHLORIDE 0.1 MG/1
0.1 TABLET ORAL 2 TIMES DAILY
Qty: 60 TABLET | Refills: 3 | Status: SHIPPED | OUTPATIENT
Start: 2019-07-16 | End: 2019-12-09

## 2019-07-16 NOTE — TELEPHONE ENCOUNTER
Pt is going to make an appointment in 6 months he said he's just not sure about his schedule, he will make sure to make an appointment though before this time is up.

## 2019-12-09 ENCOUNTER — HOSPITAL ENCOUNTER (EMERGENCY)
Age: 24
Discharge: HOME | End: 2019-12-09
Payer: COMMERCIAL

## 2019-12-09 VITALS
RESPIRATION RATE: 18 BRPM | HEART RATE: 74 BPM | TEMPERATURE: 97.7 F | DIASTOLIC BLOOD PRESSURE: 77 MMHG | OXYGEN SATURATION: 96 % | SYSTOLIC BLOOD PRESSURE: 129 MMHG

## 2019-12-09 DIAGNOSIS — J01.10 ACUTE FRONTAL SINUSITIS, RECURRENCE NOT SPECIFIED: Primary | ICD-10-CM

## 2019-12-09 PROCEDURE — 99212 OFFICE O/P EST SF 10 MIN: CPT

## 2019-12-09 PROCEDURE — 99213 OFFICE O/P EST LOW 20 MIN: CPT | Performed by: NURSE PRACTITIONER

## 2019-12-09 RX ORDER — GUAIFENESIN AND PSEUDOEPHEDRINE HCL 1200; 120 MG/1; MG/1
1 TABLET, EXTENDED RELEASE ORAL 2 TIMES DAILY PRN
Qty: 20 TABLET | Refills: 0 | Status: SHIPPED | OUTPATIENT
Start: 2019-12-09 | End: 2020-11-09

## 2019-12-09 RX ORDER — CEFDINIR 300 MG/1
300 CAPSULE ORAL 2 TIMES DAILY
Qty: 14 CAPSULE | Refills: 0 | Status: SHIPPED | OUTPATIENT
Start: 2019-12-09 | End: 2019-12-16

## 2019-12-09 RX ORDER — PREDNISONE 20 MG/1
40 TABLET ORAL DAILY
Qty: 14 TABLET | Refills: 0 | Status: SHIPPED | OUTPATIENT
Start: 2019-12-09 | End: 2019-12-16

## 2019-12-09 ASSESSMENT — PAIN DESCRIPTION - DESCRIPTORS: DESCRIPTORS: ACHING;SORE

## 2019-12-09 ASSESSMENT — ENCOUNTER SYMPTOMS
VOMITING: 0
COUGH: 0
SHORTNESS OF BREATH: 0
SINUS PRESSURE: 1
NAUSEA: 0
SORE THROAT: 1

## 2019-12-09 ASSESSMENT — PAIN DESCRIPTION - FREQUENCY: FREQUENCY: CONTINUOUS

## 2019-12-09 ASSESSMENT — PAIN DESCRIPTION - PAIN TYPE: TYPE: ACUTE PAIN

## 2019-12-09 ASSESSMENT — PAIN SCALES - GENERAL: PAINLEVEL_OUTOF10: 6

## 2020-07-06 ENCOUNTER — TELEPHONE (OUTPATIENT)
Dept: PRIMARY CARE CLINIC | Age: 25
End: 2020-07-06

## 2020-07-06 ENCOUNTER — NURSE TRIAGE (OUTPATIENT)
Dept: OTHER | Facility: CLINIC | Age: 25
End: 2020-07-06

## 2020-07-06 NOTE — TELEPHONE ENCOUNTER
Patient called today with possible broken nose , bleeding and swollen and nose bent to the side . Was offered an appointment for virtual  , refused appointment since nothing for today . Patient would like Rocco Del Cid MD today . [facility]  pharmacy confirmed in [facility].     Patient was made aware of e-visits via [employer]

## 2020-07-06 NOTE — TELEPHONE ENCOUNTER
Spoke with patient. He is going to go to ED or urgent care since Dr. Rachel Browning is out of the office.

## 2020-07-06 NOTE — TELEPHONE ENCOUNTER
Reason for Disposition   Very deformed or crooked nose    Answer Assessment - Initial Assessment Questions  1. MECHANISM: \"How did the injury happen? \"       Doing a backflip in pool and knee hit nose  2. ONSET: \"When did the injury happen? \" (Minutes or hours ago)   Occurred last night  3. LOCATION: \"What part of the nose is injured?\"        4. APPEARANCE of INJURY: \"What does the nose look like? \"     Nose is bruised and crooked. Nose is also swollen  5. BLEEDING: \"Is the nose still bleeding? \" If so, ask: \"Is it difficult to stop? \"   Yaya Breen when it first occurred, no bleeding today  6. SIZE: For cuts, bruises, or swelling, ask: \"How large is it? \" (e.g., inches or centimeters;  entire nose)     7. PAIN: \"Is it painful? \" If so, ask: \"How bad is the pain? \"   (Scale 1-10; or mild, moderate, severe)   pain when touching nose  8. TETANUS: For any breaks in the skin, ask: \"When was the last tetanus booster?\"     9. OTHER SYMPTOMS: \"Do you have any other symptoms? \" (e.g., headache, neck pain, loss of consciousness)   slight headache  10. PREGNANCY: \"Is there any chance you are pregnant? \" \"When was your last menstrual period? \"  na    Protocols used: NOSE INJURY-ADULT-[place]    Received call from [facility]. Pt calling with nasal injury that occurred last night. Did a back flip in the pool and knee hit his nose. Nose is crooked, bruised and swollen. Breathing fine. Recommend pt is seen today, call back if any new or worsening symptoms. Call soft transferred to [address] to schedule appointment. Please do not reply to the triage nurse through this encounter. Any subsequent communication should be directly with the patient.

## 2020-09-29 ENCOUNTER — TELEPHONE (OUTPATIENT)
Dept: PRIMARY CARE CLINIC | Age: 25
End: 2020-09-29

## 2020-09-29 ENCOUNTER — NURSE TRIAGE (OUTPATIENT)
Dept: OTHER | Facility: CLINIC | Age: 25
End: 2020-09-29

## 2020-09-29 NOTE — TELEPHONE ENCOUNTER
Pain in genitals. Happens only after ejaculation and the pain is in right side in scrotum. Pt did state that he had a previous sexual encounter where his testicle retracted and he had to manually pull testicle down. Since then he has been having this issue every time after intercourse with the pain. No issue with urinating. No other sx. Would like to be seen tomorrow afternoon. Would like to know if it is ok to schedule with a different provider?     [phone] pt phone

## 2020-09-29 NOTE — TELEPHONE ENCOUNTER
Reason for Disposition   Patient wants to be seen    Answer Assessment - Initial Assessment Questions  1. LOCATION and RADIATION: \"Where is the pain located? \"       Right testicle   2. QUALITY: \"What does the pain feel like? \"  (e.g., sharp, dull, aching, burning)      Ache, dull  3. SEVERITY: \"How bad is the pain? \"  (Scale 1-10; or mild, moderate, severe)    - MILD (1-3): doesn't interfere with normal activities     - MODERATE (4-7): interferes with normal activities (e.g., work or school) or awakens from sleep    - SEVERE (8-10): excruciating pain, unable to do any normal activities, difficulty walking      3-4/10  4. ONSET: \"When did the pain start? \"      For approx 1 week. 5. PATTERN: \"Does it come and go, or has it been constant since it started? \"     Intermittent   6. SCROTAL APPEARANCE: \"What does the scrotum look like? \" \"Is there any swelling or redness? \"       No redness or swelling. 7. HERNIA: \"Has a doctor ever told you that you have a hernia? \"      No known hernia  8. OTHER SYMPTOMS: \"Do you have any other symptoms? \" (e.g., fever, abdominal pain, vomiting, difficulty passing urine)      No other symptoms    Protocols used: SCROTAL PAIN-ADULT-[place]    Pt is reporting right testicular pain after ejaculation. States this has been on going for approx 1 week. Stats the pain is present for approx 1 day after ejaculation. Denies any redness or swelling. Reviewed for pt to be seen by PCP today. Warm transfer to [facility] in [place]. Caller provided care advice and instructed to call back with worsening symptoms. Attention Provider: Thank you for allowing me to participate in the care of your patient. The patient was connected to triage in response to information provided to the [facility]. Please do not respond through this encounter as the response is not directed to a shared pool.

## 2020-10-01 ENCOUNTER — OFFICE VISIT (OUTPATIENT)
Dept: PRIMARY CARE CLINIC | Age: 25
End: 2020-10-01
Payer: COMMERCIAL

## 2020-10-01 VITALS
HEART RATE: 101 BPM | BODY MASS INDEX: 23.78 KG/M2 | DIASTOLIC BLOOD PRESSURE: 80 MMHG | WEIGHT: 161 LBS | TEMPERATURE: 97.7 F | SYSTOLIC BLOOD PRESSURE: 124 MMHG

## 2020-10-01 PROCEDURE — G8427 DOCREV CUR MEDS BY ELIG CLIN: HCPCS | Performed by: FAMILY MEDICINE

## 2020-10-01 PROCEDURE — G8420 CALC BMI NORM PARAMETERS: HCPCS | Performed by: FAMILY MEDICINE

## 2020-10-01 PROCEDURE — 1036F TOBACCO NON-USER: CPT | Performed by: FAMILY MEDICINE

## 2020-10-01 PROCEDURE — G8484 FLU IMMUNIZE NO ADMIN: HCPCS | Performed by: FAMILY MEDICINE

## 2020-10-01 PROCEDURE — 99213 OFFICE O/P EST LOW 20 MIN: CPT | Performed by: FAMILY MEDICINE

## 2020-10-01 RX ORDER — CIPROFLOXACIN 500 MG/1
500 TABLET, FILM COATED ORAL 2 TIMES DAILY
Qty: 14 TABLET | Refills: 0 | Status: SHIPPED | OUTPATIENT
Start: 2020-10-01 | End: 2020-10-08

## 2020-10-01 RX ORDER — VITAMIN E 268 MG
400 CAPSULE ORAL 2 TIMES DAILY
Qty: 30 CAPSULE | Refills: 3 | Status: SHIPPED | OUTPATIENT
Start: 2020-10-01 | End: 2020-11-09

## 2020-10-01 ASSESSMENT — PATIENT HEALTH QUESTIONNAIRE - PHQ9
1. LITTLE INTEREST OR PLEASURE IN DOING THINGS: 0
2. FEELING DOWN, DEPRESSED OR HOPELESS: 0
SUM OF ALL RESPONSES TO PHQ9 QUESTIONS 1 & 2: 0
SUM OF ALL RESPONSES TO PHQ QUESTIONS 1-9: 0
SUM OF ALL RESPONSES TO PHQ QUESTIONS 1-9: 0

## 2020-10-01 ASSESSMENT — ENCOUNTER SYMPTOMS
CONSTIPATION: 0
NAUSEA: 0
VOMITING: 0
ABDOMINAL PAIN: 0
COUGH: 0
SHORTNESS OF BREATH: 0
DIARRHEA: 0

## 2020-10-01 NOTE — PATIENT INSTRUCTIONS
You can get your labs at one of the following locations:    My office building at [address] [place] [address],[address]  Phone [phone]    Or one of these locations if more convenient:  [facility]    [address] [facility], [facility] [address]    M-F 7a-5p [address] [facility]     [address] [place], [facility] [address]    M-F 7a-6p;   8a-12p  [phone]    [address]      M-F 8a-5:00p  [phone]

## 2020-11-09 ENCOUNTER — HOSPITAL ENCOUNTER (EMERGENCY)
Age: 25
Discharge: HOME | End: 2020-11-09
Payer: COMMERCIAL

## 2020-11-09 VITALS
OXYGEN SATURATION: 98 % | TEMPERATURE: 97.9 F | RESPIRATION RATE: 16 BRPM | DIASTOLIC BLOOD PRESSURE: 83 MMHG | HEART RATE: 83 BPM | BODY MASS INDEX: 25.1 KG/M2 | WEIGHT: 170 LBS | SYSTOLIC BLOOD PRESSURE: 148 MMHG

## 2020-11-09 PROCEDURE — 99284 EMERGENCY DEPT VISIT MOD MDM: CPT

## 2020-11-09 PROCEDURE — 6370000000 HC RX 637 (ALT 250 FOR IP): Performed by: PHYSICIAN ASSISTANT

## 2020-11-09 RX ORDER — NAPROXEN 250 MG/1
500 TABLET ORAL ONCE
Status: COMPLETED | OUTPATIENT
Start: 2020-11-09 | End: 2020-11-09

## 2020-11-09 RX ORDER — NAPROXEN 500 MG/1
500 TABLET ORAL 2 TIMES DAILY WITH MEALS
Qty: 30 TABLET | Refills: 0 | Status: SHIPPED | OUTPATIENT
Start: 2020-11-09 | End: 2020-12-22

## 2020-11-09 RX ORDER — CYCLOBENZAPRINE HCL 10 MG
10 TABLET ORAL 3 TIMES DAILY PRN
Qty: 21 TABLET | Refills: 0 | Status: SHIPPED | OUTPATIENT
Start: 2020-11-09 | End: 2020-11-19

## 2020-11-09 RX ADMIN — NAPROXEN 500 MG: 250 TABLET ORAL at 08:39

## 2020-11-09 ASSESSMENT — PAIN DESCRIPTION - LOCATION
LOCATION: HEAD
LOCATION: HEAD

## 2020-11-09 ASSESSMENT — PAIN DESCRIPTION - PAIN TYPE
TYPE: ACUTE PAIN
TYPE: ACUTE PAIN

## 2020-11-09 ASSESSMENT — ENCOUNTER SYMPTOMS
NAUSEA: 0
ABDOMINAL PAIN: 0
SHORTNESS OF BREATH: 0
DIARRHEA: 0
EYE PAIN: 0
BACK PAIN: 0
VOMITING: 0
COUGH: 0

## 2020-11-09 ASSESSMENT — PAIN - FUNCTIONAL ASSESSMENT
PAIN_FUNCTIONAL_ASSESSMENT: ACTIVITIES ARE NOT PREVENTED
PAIN_FUNCTIONAL_ASSESSMENT: ACTIVITIES ARE NOT PREVENTED
PAIN_FUNCTIONAL_ASSESSMENT: 0-10

## 2020-11-09 ASSESSMENT — PAIN SCALES - GENERAL
PAINLEVEL_OUTOF10: 4

## 2020-11-09 ASSESSMENT — PAIN DESCRIPTION - DESCRIPTORS
DESCRIPTORS: DISCOMFORT
DESCRIPTORS: DISCOMFORT

## 2020-11-09 ASSESSMENT — PAIN DESCRIPTION - PROGRESSION
CLINICAL_PROGRESSION: GRADUALLY WORSENING
CLINICAL_PROGRESSION: NOT CHANGED

## 2020-11-09 ASSESSMENT — PAIN DESCRIPTION - ONSET
ONSET: ON-GOING
ONSET: GRADUAL

## 2020-11-09 ASSESSMENT — PAIN DESCRIPTION - FREQUENCY
FREQUENCY: CONTINUOUS
FREQUENCY: CONTINUOUS
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
## 2020-11-09 NOTE — ED NOTES
Bed: S-50  Expected date:   Expected time:   Means of arrival:   Comments:  25M, [address], RN  11/09/20 7968

## 2020-11-12 ENCOUNTER — TELEMEDICINE (OUTPATIENT)
Dept: PRIMARY CARE CLINIC | Age: 25
End: 2020-11-12
Payer: COMMERCIAL

## 2020-11-12 PROCEDURE — G8428 CUR MEDS NOT DOCUMENT: HCPCS | Performed by: FAMILY MEDICINE

## 2020-11-12 PROCEDURE — 99213 OFFICE O/P EST LOW 20 MIN: CPT | Performed by: FAMILY MEDICINE

## 2020-11-12 ASSESSMENT — ENCOUNTER SYMPTOMS
NAUSEA: 0
BACK PAIN: 1
VOMITING: 0
COUGH: 0
CONSTIPATION: 0
ABDOMINAL PAIN: 0
SHORTNESS OF BREATH: 0
DIARRHEA: 0
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
## 2020-11-12 NOTE — PATIENT INSTRUCTIONS
hands and repeat the same exercise on your left side. Forward bend strengthening   1. Place your first two fingers of either hand on your forehead. 2. Start to bend your head forward while using gentle pressure from your fingers to keep your head from bending. 3. Hold for about 6 seconds. 4. Repeat 8 to 12 times. Neutral position strengthening   1. Using one hand, place your fingertips on the back of your head at the top of your neck. 2. Start to bend your head backward while using gentle pressure from your fingers to keep your head from bending. 3. Hold for about 6 seconds. 4. Repeat 8 to 12 times. Chin tuck   1. Lie on the floor with a rolled-up towel under your neck. Your head should be touching the floor. 2. Slowly bring your chin toward your chest.  3. Hold for a count of 6, and then relax for up to 10 seconds. 4. Repeat 8 to 12 times. Follow-up care is a key part of your treatment and safety. Be sure to make and go to all appointments, and call your doctor if you are having problems. It's also a good idea to know your test results and keep a list of the medicines you take. Where can you learn more? Go to https://SocialCrunchpeHookLogic.Pollen. org and sign in to your [employer] account. Enter M679 in the [employer] box to learn more about \"Neck Strain or Sprain: Rehab Exercises. \"     If you do not have an account, please click on the \"Sign Up Now\" link. Current as of: March 2, 2020               Content Version: 12.6  © 2006-2020 [employer]. Care instructions adapted under license by [facility] ([facility]). If you have questions about a medical condition or this instruction, always ask your healthcare professional. [place] [address] any warranty or liability for your use of this information. Patient Education        Hip Flexor Strain: Rehab Exercises  Introduction  Here are some examples of exercises for you to try.  The exercises may be suggested for a condition or for rehabilitation. Start each exercise slowly. Ease off the exercises if you start to have pain. You will be told when to start these exercises and which ones will work best for you. How to do the exercises  Pelvic tilt with marching   1. Lie on your back with your knees bent and your feet flat on the floor. 2. Tighten your belly muscles and buttocks, and press your lower back to the floor. 3. Keeping your knees bent, lift and then lower one leg up off the floor, and then lift and lower your other leg like you are marching. Each time you lift your leg, hold that position for about 6 seconds before lowering your leg. 4. Repeat 8 to 12 times. Scissors   1. Lie on your back with your knees bent at a 90-degree angle and your feet off the floor. 2. Tighten your belly muscles and buttocks, and press your lower back to the floor. 3. Slowly straighten one leg, and hold that position for about 6 seconds. Your leg should be about 12 inches off the floor. Bring that leg back to the starting position, and then straighten your other leg. Hold that position for about 6 seconds, and then switch legs again. 4. Repeat 8 to 12 times. Hamstring stretch (lying down)   1. Lie flat on your back with your legs straight. If you feel discomfort in your back, place a small towel roll under your lower back. 2. Holding the back of your affected leg for support, lift your leg straight up and toward your body until you feel a stretch at the back of your thigh. 3. Hold the stretch for at least 30 seconds. 4. Repeat 2 to 4 times. Quadricep and hip flexor stretch (lying on side)   1. Lie on your side with your good leg flat on the floor and your hand supporting your head. 2. Bend your top leg, and reach behind you to grab the front of that foot or ankle with your other hand. 3. Stretch your leg back by pulling your foot toward your buttock. You will feel the stretch in the front of your thigh.  If this causes stress on your knee, do not do this stretch. 4. Hold the stretch for at least 15 to 30 seconds. 5. Repeat 2 to 4 times. Hip flexor stretch (kneeling)   1. Kneel on your affected leg and bend your good leg out in front of you, with that foot flat on the floor. If you feel discomfort in the front of your knee, place a towel under your knee. 2. Keeping your back straight, slowly push your hips forward until you feel a stretch in the upper thigh of your back leg and hip. 3. Hold the stretch for at least 15 to 30 seconds. 4. Repeat 2 to 4 times. Hip flexor stretch (edge of table)   1. Lie flat on your back on a table or flat bench, with your knees and lower legs hanging off the edge of the table. 2. Grab your good leg at the knee, and pull that knee back toward your chest. Relax your affected leg and let it hang down toward the floor until you feel a stretch in the upper thigh of your affected leg and hip. 3. Hold the stretch for at least 15 to 30 seconds. 4. Repeat 2 to 4 times. Follow-up care is a key part of your treatment and safety. Be sure to make and go to all appointments, and call your doctor if you are having problems. It's also a good idea to know your test results and keep a list of the medicines you take. Where can you learn more? Go to https://Micropeltpepicbehavieweb.SprinkleBit. org and sign in to your [employer] account. Enter Q858 in the [facility] box to learn more about \"Hip Flexor Strain: Rehab Exercises. \"     If you do not have an account, please click on the \"Sign Up Now\" link. Current as of: March 2, 2020               Content Version: 12.6  © 4791-1916 [employer]. Care instructions adapted under license by [facility] ([facility]). If you have questions about a medical condition or this instruction, always ask your healthcare professional. [address] any warranty or liability for your use of this information.          Patient Education Low Back Pain: Exercises  Introduction  Here are some examples of exercises for you to try. The exercises may be suggested for a condition or for rehabilitation. Start each exercise slowly. Ease off the exercises if you start to have pain. You will be told when to start these exercises and which ones will work best for you. How to do the exercises  Press-up   5. Lie on your stomach, supporting your body with your forearms. 6. Press your elbows down into the floor to raise your upper back. As you do this, relax your stomach muscles and allow your back to arch without using your back muscles. As your press up, do not let your hips or pelvis come off the floor. 7. Hold for 15 to 30 seconds, then relax. 8. Repeat 2 to 4 times. Alternate arm and leg (bird dog) exercise   Do this exercise slowly. Try to keep your body straight at all times, and do not let one hip drop lower than the other. 5. Start on the floor, on your hands and knees. 6. Tighten your belly muscles. 7. Raise one leg off the floor, and hold it straight out behind you. Be careful not to let your hip drop down, because that will twist your trunk. 8. Hold for about 6 seconds, then lower your leg and switch to the other leg. 9. Repeat 8 to 12 times on each leg. 10. Over time, work up to holding for 10 to 30 seconds each time. 11. If you feel stable and secure with your leg raised, try raising the opposite arm straight out in front of you at the same time. Knee-to-chest exercise   5. Lie on your back with your knees bent and your feet flat on the floor. 6. Bring one knee to your chest, keeping the other foot flat on the floor (or keeping the other leg straight, whichever feels better on your lower back). 7. Keep your lower back pressed to the floor. Hold for at least 15 to 30 seconds. 8. Relax, and lower the knee to the starting position. 9. Repeat with the other leg. Repeat 2 to 4 times with each leg.   10. To get more stretch, put your other leg flat on the floor while pulling your knee to your chest.    Curl-ups   6. Lie on the floor on your back with your knees bent at a 90-degree angle. Your feet should be flat on the floor, about 12 inches from your buttocks. 7. Cross your arms over your chest. If this bothers your neck, try putting your hands behind your neck (not your head), with your elbows spread apart. 8. Slowly tighten your belly muscles and raise your shoulder blades off the floor. 9. Keep your head in line with your body, and do not press your chin to your chest.  10. Hold this position for 1 or 2 seconds, then slowly lower yourself back down to the floor. 11. Repeat 8 to 12 times. Pelvic tilt exercise   5. Lie on your back with your knees bent. 6. \"Brace\" your stomach. This means to tighten your muscles by pulling in and imagining your belly button moving toward your spine. You should feel like your back is pressing to the floor and your hips and pelvis are rocking back. 7. Hold for about 6 seconds while you breathe smoothly. 8. Repeat 8 to 12 times. Heel dig bridging   5. Lie on your back with both knees bent and your ankles bent so that only your heels are digging into the floor. Your knees should be bent about 90 degrees. 6. Then push your heels into the floor, squeeze your buttocks, and lift your hips off the floor until your shoulders, hips, and knees are all in a straight line. 7. Hold for about 6 seconds as you continue to breathe normally, and then slowly lower your hips back down to the floor and rest for up to 10 seconds. 8. Do 8 to 12 repetitions. Hamstring stretch in doorway   1. Lie on your back in a doorway, with one leg through the open door. 2. Slide your leg up the wall to straighten your knee. You should feel a gentle stretch down the back of your leg. 3. Hold the stretch for at least 15 to 30 seconds. Do not arch your back, point your toes, or bend either knee.  Keep one heel touching the floor

## 2020-12-03 ENCOUNTER — NURSE TRIAGE (OUTPATIENT)
Dept: OTHER | Facility: CLINIC | Age: 25
End: 2020-12-03

## 2020-12-03 ENCOUNTER — TELEPHONE (OUTPATIENT)
Dept: PRIMARY CARE CLINIC | Age: 25
End: 2020-12-03

## 2020-12-03 NOTE — TELEPHONE ENCOUNTER
----- Message from Jeanmariejoey Norwood sent at 12/3/2020 10:04 AM EST -----  Subject: Appointment Request    Reason for Call: Immediate Return from RN Triage    QUESTIONS  Type of Appointment? Established Patient  Reason for appointment request? No appointments available during search  Additional Information for Provider? pt having covid symptoms including   runny nose   cough   and fatigue   will be scheduling covid test also.  ---------------------------------------------------------------------------  --------------  CALL BACK INFO  What is the best way for the office to contact you? OK to leave message on   voicemail  Preferred Call Back Phone Number? [phone]  ---------------------------------------------------------------------------  --------------  SCRIPT ANSWERS  Patient needs to be seen today? Yes  Nurse Name? [facility]  (Did RN indicate the need for Red Scheduling?)?  Yes

## 2020-12-03 NOTE — TELEPHONE ENCOUNTER
Both Dr. Kalani Madsen and Dr. Judi Macedo are booked until Thursday 12/10/2020?   Should I double book or schedule pt for Thursday

## 2020-12-03 NOTE — TELEPHONE ENCOUNTER
many days ago)      Yes 1 week    7. COMMUNITY SPREAD: \"Are there lots of cases of COVID-19 (community spread) where you live? \" (See public health department website, if unsure)       Yes    8. SYMPTOMS: \"Do you have any symptoms? \" (e.g., fever, cough, breathing difficulty, loss of taste or smell)      Cough    9. PREGNANCY OR POSTPARTUM: \"Is there any chance you are pregnant? \" \"When was your last menstrual period? \" \"Did you deliver in the last 2 weeks? \"     N/a    10. HIGH RISK: \"Do you have any heart or lung problems? Do you have a weak immune system? \" (e.g., heart failure, COPD, asthma, HIV positive, chemotherapy, renal failure, diabetes mellitus, sickle cell anemia, obesity)       No    11. TRAVEL: \"Have you traveled out of the country recently? \" If so, \"When and where? \" Also ask about out-of-state travel, since the [facility] has identified some high-risk cities for community spread in the [address] [place] [address]. Note: Travel becomes less relevant if there is widespread community transmission where the patient lives. n/a    Answer Assessment - Initial Assessment Questions  1. COVID-19 DIAGNOSIS: \"Who made your Coronavirus (COVID-19) diagnosis? \" \"Was it confirmed by a positive lab test?\" If not diagnosed by a HCP, ask \"Are there lots of cases (community spread) where you live? \" (See public health department website, if unsure)      No dx, \" that's why im calling for test\"    2. COVID-19 EXPOSURE: \"Was there any known exposure to [place] before the symptoms began? \" CDC Definition of close contact: within 6 feet (2 meters) for a total of 15 minutes or more over a 24-hour period. No    3. ONSET: \"When did the COVID-19 symptoms start? \"       Past two days, has missed work    4. WORST SYMPTOM: \"What is your worst symptom? \" (e.g., cough, fever, shortness of breath, muscle aches)     Fatigue, muscle pain    5. COUGH: \"Do you have a cough? \" If so, ask: \"How bad is the cough? \"       Yes    6. FEVER: \"Do you have a fever? \" If so, ask: \"What is your temperature, how was it measured, and when did it start? \"     No    7. RESPIRATORY STATUS: \"Describe your breathing? \" (e.g., shortness of breath, wheezing, unable to speak)      No sob      8. BETTER-SAME-WORSE: Babak Burnett you getting better, staying the same or getting worse compared to yesterday? \"  If getting worse, ask, \"In what way? \"     \" I feel ok to work but I have missed a couple days for not feeling well\"     9. HIGH RISK DISEASE: \"Do you have any chronic medical problems? \" (e.g., asthma, heart or lung disease, weak immune system, etc.)     No    10. PREGNANCY: \"Is there any chance you are pregnant? \" \"When was your last menstrual period? \"       N/a    11. OTHER SYMPTOMS: \"Do you have any other symptoms? \"  (e.g., chills, fatigue, headache, loss of smell or taste, muscle pain, sore throat)       Chills yesterday, fatigue, and muscle pain    Protocols used: CORONAVIRUS (COVID-19 ) EXPOSURE-ADULT-[place], CORONAVIRUS (COVID-19)  DIAGNOSED OR SUSPECTED-ADULT-[place]

## 2020-12-03 NOTE — TELEPHONE ENCOUNTER
Reason for Disposition   Caller has already spoken with the PCP and has no further questions. Protocols used: NO CONTACT OR DUPLICATE CONTACT CALL-ADULT-AH    Pt was already triaged, spoke to office as well, PCP recommended a VV to give pt a note for work. Pt calling back to schedule the VV per the PCP recommendation. Melita at [facility] to schedule as recommended. No triage at this time.

## 2020-12-03 NOTE — TELEPHONE ENCOUNTER
Please call:  He can call to schedule his test:    [id number] testing sites (call for an appointment [phone] and realize hours have been changing): Only accepting [facility], employees or patients scheduled for surgery/procedure. COVID testing locations and hours   [address] Phone: [phone] Monday - Friday 8:00 am - 1:00 pm  [facility] ([address] [facility], [address] [facility])  Phone: [phone]  Monday - Friday 8:00 am - 1:00 pm  [facility]  - [address], [address], [facility], [address])   Phone:  [phone]  Monday - Friday 8:00 am - 1:00 pm  [facility]  ([facility], [address])  Phone:  [phone]   Monday - Friday 8:00 am - 3:00 pm            Here is a link to the most up to date and accurate information on the topic:    [email or web address].[email or web address]. html

## 2020-12-03 NOTE — TELEPHONE ENCOUNTER
Is he able to get a VV with Dr Néstor Vela for the work note? Or does he want a COVID test?    He will need to be seen before he gets a work note.      Thank you,   Dr. Lizbet Katz

## 2020-12-03 NOTE — TELEPHONE ENCOUNTER
----- Message from Oscar Mcconnell sent at 12/3/2020 10:07 AM EST -----  Subject: Message to Provider    QUESTIONS  Information for Provider? pt also requesting note to be excused for work   will need to include 12/2 to 12/4 email note to [email or web address]  ---------------------------------------------------------------------------  --------------  CALL BACK INFO  What is the best way for the office to contact you? OK to leave message on   [employer]  Preferred Call Back Phone Number? [phone]  ---------------------------------------------------------------------------  --------------  SCRIPT ANSWERS  Relationship to Patient?  Self

## 2020-12-04 ENCOUNTER — OFFICE VISIT (OUTPATIENT)
Dept: PRIMARY CARE CLINIC | Age: 25
End: 2020-12-04
Payer: COMMERCIAL

## 2020-12-04 PROCEDURE — 99211 OFF/OP EST MAY X REQ PHY/QHP: CPT | Performed by: NURSE PRACTITIONER

## 2020-12-04 PROCEDURE — G8428 CUR MEDS NOT DOCUMENT: HCPCS | Performed by: NURSE PRACTITIONER

## 2020-12-04 PROCEDURE — G8419 CALC BMI OUT NRM PARAM NOF/U: HCPCS | Performed by: NURSE PRACTITIONER

## 2020-12-07 LAB — SARS-COV-2, NAA: NOT DETECTED

## 2020-12-11 ENCOUNTER — TELEPHONE (OUTPATIENT)
Dept: PRIMARY CARE CLINIC | Age: 25
End: 2020-12-11

## 2020-12-11 NOTE — TELEPHONE ENCOUNTER
Pt calling in to request an appt for heel pain with Dr. Rick Mancilla. Cannot be Monday or Wednesday and has to be after 4:30 pm. He states that he has been accommodated before and would like to be again for this. When would you like me to schedule this?

## 2020-12-11 NOTE — TELEPHONE ENCOUNTER
Please call: You can add him onto my clinic on Monday the 14th.   He must be here at 430 sharp if not sooner

## 2020-12-22 ENCOUNTER — TELEMEDICINE (OUTPATIENT)
Dept: PRIMARY CARE CLINIC | Age: 25
End: 2020-12-22
Payer: COMMERCIAL

## 2020-12-22 PROCEDURE — 99213 OFFICE O/P EST LOW 20 MIN: CPT | Performed by: FAMILY MEDICINE

## 2020-12-22 PROCEDURE — G8428 CUR MEDS NOT DOCUMENT: HCPCS | Performed by: FAMILY MEDICINE

## 2020-12-22 RX ORDER — IBUPROFEN 800 MG/1
800 TABLET ORAL
Qty: 90 TABLET | Refills: 0 | Status: SHIPPED | OUTPATIENT
Start: 2020-12-22 | End: 2021-06-24

## 2020-12-22 ASSESSMENT — ENCOUNTER SYMPTOMS
CONSTIPATION: 0
DIARRHEA: 0
SHORTNESS OF BREATH: 0
COUGH: 0
ABDOMINAL PAIN: 0
VOMITING: 0
NAUSEA: 0

## 2020-12-22 NOTE — PATIENT INSTRUCTIONS
following:  ? Avoid aggravating activities  ? Apply ice when symptomatic  ? Take a short course (7 to 10 days) of nonsteroidal antiinflammatory drugs (NSAIDs)  ? Support the Achilles with a heel lift or elastic bandage or taping (picture 3) as needed    [facility] [place]  [phone]
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
## 2020-12-29 ENCOUNTER — PATIENT MESSAGE (OUTPATIENT)
Dept: PRIMARY CARE CLINIC | Age: 25
End: 2020-12-29

## 2020-12-30 DIAGNOSIS — Z20.822 SUSPECTED COVID-19 VIRUS INFECTION: ICD-10-CM

## 2020-12-30 LAB — SARS-COV-2 ANTIBODY, TOTAL: NEGATIVE

## 2020-12-30 NOTE — TELEPHONE ENCOUNTER
From: Lana Boliavr  To: Angela Lucero MD  Sent: 12/29/2020 6:58 PM EST  Subject: Test Results Question    Hey I got a quick question. I have all the symptoms my dad had when he had the corona virus but my test results came back negative. What could I have?

## 2021-01-01 NOTE — PROGRESS NOTES
Chief Complaint   Patient presents with    Other     penis pain       HPI: Dora Resendez presents for evaluation and management of 10-day history of right inguinal pain with ejaculation that began after restraining the urge to ejaculate while he was having sex. He notes no discharge no testicular swelling states that it only seems to hurt with ejaculation and shortly thereafter. He has had a consistent sex partner for the last few months with whom he has only had unprotected sex once he notes his total number of sex partners in his life is approximately 5. Review of Systems   Constitutional: Negative for chills and fever. Respiratory: Negative for cough and shortness of breath. Cardiovascular: Negative for chest pain and palpitations. Gastrointestinal: Negative for abdominal pain, constipation, diarrhea, nausea and vomiting. Endocrine: Negative for polyuria. Genitourinary: Negative for dysuria. No Known Allergies  New Prescriptions    No medications on file     Current Outpatient Medications   Medication Sig Dispense Refill    Pseudoephedrine-guaiFENesin (MUCINEX D MAX STRENGTH) 120-1200 MG TB12 Take 1 tablet by mouth 2 times daily as needed (cough/congestion) 20 tablet 0    aluminum chloride (DRYSOL) 20 % external solution Apply topically nightly. 60 mL 1    cetirizine (ZYRTEC) 10 MG tablet Take 10 mg by mouth daily. No current facility-administered medications for this visit. Past Medical History:   Diagnosis Date    Allergic rhinitis, seasonal     Attention deficit hyperactivity disorder (ADHD), predominantly inattentive type 8/31/2018    Conjunctivitis, allergic          Objective   BP (!) 140/83   Pulse 101   Temp 97.7 °F (36.5 °C) (Tympanic)   Wt 161 lb (73 kg)   BMI 23.78 kg/m²   Wt Readings from Last 3 Encounters:   10/01/20 161 lb (73 kg)   05/22/19 165 lb (74.8 kg)   04/05/19 164 lb (74.4 kg)         Assessment   Plan     1.  Pain in right testicle  Suspect strain of cremasteric muscle. We will trial Cipro and vitamin E and recommended rest.  Follow-up in 1 month  - vitamin E 400 UNIT capsule; Take 1 capsule by mouth 2 times daily  Dispense: 30 capsule; Refill: 3  - ciprofloxacin (CIPRO) 500 MG tablet; Take 1 tablet by mouth 2 times daily for 7 days  Dispense: 14 tablet; Refill: 0    2. Screening for HIV (human immunodeficiency virus)  Screen  - HIV Screen; Future    3. Screening for viral and chlamydial diseases  Screen  - C.trachomatis N.gonorrhoeae DNA, Urine; Future    Discussed use, benefit, and side effects of prescribed medications. Barriers to medication compliance addressed. All patient questions answered. Pt voiced understanding.        Health Maintenance   Topic Date Due    HPV vaccine (1 - Male 2-dose series) 04/28/2008    HIV screen  04/28/2012    Flu vaccine (1) 09/01/2020    DTaP/Tdap/Td vaccine (7 - Td) 03/26/2029    Hepatitis B vaccine  Completed    Varicella vaccine  Completed    Hepatitis A vaccine  Aged Out    Hib vaccine  Aged Out    Meningococcal (ACWY) vaccine  Aged Out    Pneumococcal 0-64 years Vaccine  Aged Out       RTC 1 month 2.585

## 2021-01-07 DIAGNOSIS — J01.90 ACUTE NON-RECURRENT SINUSITIS, UNSPECIFIED LOCATION: Primary | ICD-10-CM

## 2021-01-07 DIAGNOSIS — J01.90 ACUTE NON-RECURRENT SINUSITIS, UNSPECIFIED LOCATION: ICD-10-CM

## 2021-01-07 RX ORDER — AMOXICILLIN AND CLAVULANATE POTASSIUM 875; 125 MG/1; MG/1
1 TABLET, FILM COATED ORAL 2 TIMES DAILY
Qty: 14 TABLET | Refills: 0 | Status: SHIPPED | OUTPATIENT
Start: 2021-01-07 | End: 2021-01-14

## 2021-01-07 RX ORDER — AMOXICILLIN AND CLAVULANATE POTASSIUM 875; 125 MG/1; MG/1
1 TABLET, FILM COATED ORAL 2 TIMES DAILY
Qty: 14 TABLET | Refills: 0 | Status: SHIPPED | OUTPATIENT
Start: 2021-01-07 | End: 2021-01-07 | Stop reason: SDUPTHER

## 2021-01-08 ENCOUNTER — TELEPHONE (OUTPATIENT)
Dept: PRIMARY CARE CLINIC | Age: 26
End: 2021-01-08

## 2021-01-08 NOTE — TELEPHONE ENCOUNTER
Pt's mother called in, rx amoxicillin-clavulanate (AUGMENTIN) 875-125 MG per tablet [[id number]] was sent to two different pharmacies. Paul is stating they cannot fill this since it was sent to two separate pharmacies. Unsure how to fix this.

## 2021-01-11 ENCOUNTER — TELEPHONE (OUTPATIENT)
Dept: PRIMARY CARE CLINIC | Age: 26
End: 2021-01-11

## 2021-01-11 ENCOUNTER — VIRTUAL VISIT (OUTPATIENT)
Dept: PRIMARY CARE CLINIC | Age: 26
End: 2021-01-11
Payer: COMMERCIAL

## 2021-01-11 ENCOUNTER — NURSE TRIAGE (OUTPATIENT)
Dept: OTHER | Facility: CLINIC | Age: 26
End: 2021-01-11

## 2021-01-11 DIAGNOSIS — R68.89 GENERAL ILL FEELING: Primary | ICD-10-CM

## 2021-01-11 PROCEDURE — 99422 OL DIG E/M SVC 11-20 MIN: CPT | Performed by: STUDENT IN AN ORGANIZED HEALTH CARE EDUCATION/TRAINING PROGRAM

## 2021-01-11 RX ORDER — OXYMETAZOLINE HYDROCHLORIDE 5 G/100ML
2 SPRAY NASAL 2 TIMES DAILY
Qty: 2 ML | Refills: 0 | Status: SHIPPED | OUTPATIENT
Start: 2021-01-11 | End: 2021-01-14

## 2021-01-11 ASSESSMENT — ENCOUNTER SYMPTOMS
SINUS PRESSURE: 1
RHINORRHEA: 1
SHORTNESS OF BREATH: 1
WHEEZING: 0
FACIAL SWELLING: 0
COUGH: 1
SORE THROAT: 1
CHEST TIGHTNESS: 0
SINUS PAIN: 1

## 2021-01-11 NOTE — PROGRESS NOTES
2021    TELEHEALTH EVALUATION -- Audio/Visual (During XJGTB-70 public health emergency)    HPI:    Fatou Brizuela (:  1995) has requested an audio/video evaluation for the following concern(s):    Viral illness:  Fatou Brizuela is a 23 y.o. male who presents for evaluation of influenza like symptoms. Symptoms include chills, headache, myalgias, productive cough, shortness of breath, low-grade fever, sinus and nasal congestion and fever and have been present for 3 weeks. He has tried to alleviate the symptoms with acetaminophen,dayquil/nyquil with minimal relief. High risk factors for influenza complications: none. He has had 2 negative COVID-19 tests: 1 nasal swab and one blood test.  Last week he called complaining of right-sided frontal sinus pain and was started on Augmentin. Today is his third day. He reports minimal improvement. Review of Systems   Constitutional: Positive for activity change, appetite change, chills, fatigue and fever (Low-grade ). HENT: Positive for congestion, postnasal drip, rhinorrhea, sinus pressure, sinus pain and sore throat. Negative for ear discharge, facial swelling and hearing loss. Eyes: Negative for visual disturbance. Respiratory: Positive for cough and shortness of breath. Negative for chest tightness and wheezing. Cardiovascular: Negative for chest pain. Allergic/Immunologic: Negative for environmental allergies and food allergies. Neurological: Positive for dizziness. Prior to Visit Medications    Medication Sig Taking?  Authorizing Provider   oxymetazoline (12 HOUR NASAL SPRAY) 0.05 % nasal spray 2 sprays by Nasal route 2 times daily for 3 days Yes Codie Ross DO   amoxicillin-clavulanate (AUGMENTIN) 875-125 MG per tablet Take 1 tablet by mouth 2 times daily for 7 days  Codie Ross DO   ibuprofen (ADVIL;MOTRIN) 800 MG tablet Take 1 tablet by mouth 3 times daily (with meals)  Bev Watkins MD Loratadine-Pseudoephedrine (CLARITIN-D 12 HOUR PO) Take by mouth  Historical Provider, MD       Social History     Tobacco Use    Smoking status: Never Smoker    Smokeless tobacco: Never Used    Tobacco comment: counseled on tobacco exposure avoidance   Substance Use Topics    Alcohol use: Yes     Alcohol/week: 0.0 standard drinks     Types: 5 Cans of beer, 3 Shots of liquor per week     Comment: occasaionlly     Drug use: No        Past Medical History:   Diagnosis Date    Allergic rhinitis, seasonal     Attention deficit hyperactivity disorder (ADHD), predominantly inattentive type 8/31/2018    Conjunctivitis, allergic        PHYSICAL EXAMINATION:  [ INSTRUCTIONS:  \"[x]\" Indicates a positive item  \"[]\" Indicates a negative item  -- DELETE ALL ITEMS NOT EXAMINED]  [x] Alert  [x] Oriented to person/place/time    [x] No apparent distress  [] Toxic appearing    [] Face flushed appearing [] Sclera clear  [] Lips are cyanotic      [x] converses normal  [x] coughing      [] Rash on visible skin    [] Cranial Nerves II-XII grossly intact    [] Motor grossly intact in visible upper extremities    [] Motor grossly intact in visible lower extremities    [x] Normal Mood  [] Anxious appearing    [] Depressed appearing  [] Confused appearing      [] Poor short term memory  [] Poor long term memory    [] OTHER:      Due to this being a TeleHealth encounter, evaluation of the following organ systems is limited: Vitals/Constitutional/EENT/Resp/CV/GI//MS/Neuro/Skin/Heme-Lymph-Imm.     ASSESSMENT/PLAN: 1. General ill feeling: Doubt Covid given 2 (-) tests. Another viral illness including influenza is on the differential.  Will check antigens. Given this is been persistent for about 3 weeks we will check CBC and chest x-ray. He is only on day 3 of Augmentin, so I am still hopeful this could improve his symptoms. We will plan on following up on Thursday. If blood work normal and he is still not improved, then he will need to be seen in person. - CBC WITH AUTO DIFFERENTIAL; Future  - XR CHEST (2 VW); Future  - RAPID INFLUENZA A/B ANTIGENS; Future  - oxymetazoline (12 HOUR NASAL SPRAY) 0.05 % nasal spray; 2 sprays by Nasal route 2 times daily for 3 days  Dispense: 2 mL; Refill: 0    Greater than 10 minutes spent    Return in about 1 week (around 1/18/2021). An  electronic signature was used to authenticate this note. --Jb Hernandez,  on 1/11/2021 at 3:22 PM        Pursuant to the emergency declaration under the [facility]5 waiver authority and the [employer] Act, this Virtual  Visit was conducted, with patient's consent, to reduce the patient's risk of exposure to COVID-19 and provide continuity of care for an established patient. Services were provided through a video synchronous discussion virtually to substitute for in-person clinic visit.

## 2021-01-11 NOTE — TELEPHONE ENCOUNTER
Patient called pre-service center [facility] with red flag complaint. Brief description of triage: sinusitis    Triage indicates for patient to be seen today    Care advice provided, patient verbalizes understanding; denies any other questions or concerns; instructed to call back for any new or worsening symptoms. Writer provided warm transfer to [address] at [facility] for appointment scheduling. Attention Provider: Thank you for allowing me to participate in the care of your patient. The patient was connected to triage in response to information provided to the [facility]. Please do not respond through this encounter as the response is not directed to a shared pool. Reason for Disposition   Sinus pain (not just congestion) and fever    Answer Assessment - Initial Assessment Questions  1. ONSET: \"When did the nasal discharge start? \"       About two weeks ago    2. AMOUNT: \"How much discharge is there? \"       Moderate    3. COUGH: \"Do you have a cough? \" If yes, ask: \"Describe the color of your sputum\" (clear, white, yellow, green)      Mild cough    4. RESPIRATORY DISTRESS: \"Describe your breathing. \"       Short of breath, blocked sinuses    5. FEVER: \"Do you have a fever? \" If so, ask: \"What is your temperature, how was it measured, and when did it start? \"      No    6. SEVERITY: \"Overall, how bad are you feeling right now? \" (e.g., doesn't interfere with normal activities, staying home from school/work, staying in bed)       Unable to work because of dizziness and barely able to do normal activities    7. OTHER SYMPTOMS: \"Do you have any other symptoms? \" (e.g., sore throat, earache, wheezing, vomiting)      Sore throat, runny nose, chills, aches    8. PREGNANCY: \"Is there any chance you are pregnant? \" \"When was your last menstrual period? \"      N/A    Protocols used: COMMON COLD-ADULT-[place]

## 2021-01-11 NOTE — TELEPHONE ENCOUNTER
----- Message from [id number] DEXMA sent at 1/8/2021  4:04 PM EST -----  Subject: Message to Provider    QUESTIONS  Information for Provider? Script for amoxicillin-clavulanate (AUGMENTIN)   875-125 MG per tablet was sent to a pharmacy in [facility] in error. Script needs to be sent to [facility] in   [address]  ---------------------------------------------------------------------------  --------------  [address]  What is the best way for the office to contact you? OK to leave message on   voicemail  Preferred Call Back Phone Number? [phone]  ---------------------------------------------------------------------------  --------------  SCRIPT ANSWERS  Relationship to Patient? Parent  Representative Name? Meg Weldon  Patient is under 25 and the Parent has custody? No  Is the Representative on the appropriate HIPAA document in Epic?  Yes

## 2021-01-12 ENCOUNTER — HOSPITAL ENCOUNTER (OUTPATIENT)
Dept: GENERAL RADIOLOGY | Age: 26
Discharge: HOME | End: 2021-01-12
Payer: COMMERCIAL

## 2021-01-12 ENCOUNTER — HOSPITAL ENCOUNTER (OUTPATIENT)
Age: 26
Discharge: HOME | End: 2021-01-12
Payer: COMMERCIAL

## 2021-01-12 DIAGNOSIS — M77.51 CALCANEAL BURSITIS (HEEL), RIGHT: ICD-10-CM

## 2021-01-12 DIAGNOSIS — R68.89 GENERAL ILL FEELING: ICD-10-CM

## 2021-01-12 DIAGNOSIS — G89.29 CHRONIC HEEL PAIN, RIGHT: ICD-10-CM

## 2021-01-12 DIAGNOSIS — M79.671 CHRONIC HEEL PAIN, RIGHT: ICD-10-CM

## 2021-01-12 LAB
BASOPHILS ABSOLUTE: 0 K/UL (ref 0–0.2)
BASOPHILS RELATIVE PERCENT: 0.5 %
EOSINOPHILS ABSOLUTE: 0.2 K/UL (ref 0–0.6)
EOSINOPHILS RELATIVE PERCENT: 3.5 %
HCT VFR BLD CALC: 49.6 % (ref 40.5–52.5)
HEMOGLOBIN: 16.9 G/DL (ref 13.5–17.5)
LYMPHOCYTES ABSOLUTE: 1.6 K/UL (ref 1–5.1)
LYMPHOCYTES RELATIVE PERCENT: 38.6 %
MCH RBC QN AUTO: 30.7 PG (ref 26–34)
MCHC RBC AUTO-ENTMCNC: 34.1 G/DL (ref 31–36)
MCV RBC AUTO: 90.1 FL (ref 80–100)
MONOCYTES ABSOLUTE: 0.5 K/UL (ref 0–1.3)
MONOCYTES RELATIVE PERCENT: 11.4 %
NEUTROPHILS ABSOLUTE: 2 K/UL (ref 1.7–7.7)
NEUTROPHILS RELATIVE PERCENT: 46 %
PDW BLD-RTO: 13.1 % (ref 12.4–15.4)
PLATELET # BLD: 245 K/UL (ref 135–450)
PMV BLD AUTO: 8.6 FL (ref 5–10.5)
RBC # BLD: 5.51 M/UL (ref 4.2–5.9)
WBC # BLD: 4.3 K/UL (ref 4–11)

## 2021-01-12 PROCEDURE — 73650 X-RAY EXAM OF HEEL: CPT

## 2021-01-12 PROCEDURE — 71046 X-RAY EXAM CHEST 2 VIEWS: CPT

## 2021-01-13 ENCOUNTER — TELEPHONE (OUTPATIENT)
Dept: PRIMARY CARE CLINIC | Age: 26
End: 2021-01-13

## 2021-01-13 NOTE — TELEPHONE ENCOUNTER
----- Message from Odalys Tucker sent at 1/13/2021 10:30 AM EST -----  Subject: Appointment Request    Reason for Call: Urgent (Patient Request) Existing Condition Follow    QUESTIONS  Type of Appointment? Established Patient  Reason for appointment request? No appointments available during search  Additional Information for Provider? Patient states that Dr. Ary Ray was   supposed to contact him in regards his test results   wanted to have a video call with him  ---------------------------------------------------------------------------  --------------  [address]  What is the best way for the office to contact you? OK to leave message on   voicemail  Preferred Call Back Phone Number? [phone]  ---------------------------------------------------------------------------  --------------  SCRIPT ANSWERS  Relationship to Patient? Self  Appointment reason? Well Care/Follow Ups  Select a Well Care/Follow Ups appointment reason? Adult Existing Condition   Follow Up [Diabetes   CHF   COPD   Hypertension/Blood Pressure Check]  (Is the patient requesting to be seen urgently for their symptoms?)? Yes  Is this follow up request related to routine Diabetes Management? No  Are you having any new concerns about your existing condition? No  Have you been diagnosed with   tested for   or told that you are suspected of having COVID-19 (Coronavirus)? Yes  Did your symptoms begin or have you been tested for COVID-19 in the last   10 days? No  Have you had a fever or taken medication to treat a fever within the past   3 days? No  Have you had a cough   shortness of breath or flu-like symptoms within the past 3 days? No  Do you currently have flu-like symptoms including fever or chills   cough   shortness of breath   or difficulty breathing   or new loss of taste or smell? No  (Service Expert  click yes below to proceed with [facility] As [facility]ual   Scheduling)?  Yes

## 2021-01-15 ENCOUNTER — TELEPHONE (OUTPATIENT)
Dept: PRIMARY CARE CLINIC | Age: 26
End: 2021-01-15

## 2021-01-15 NOTE — TELEPHONE ENCOUNTER
Pt called in to request an appt with Dr. Gian Ritter today. States he has been in contact with Dr. Gian Ritter regarding this. Pt has had a fever. Advised that there are no doctors in office today. What do you advise?

## 2021-06-18 ENCOUNTER — PATIENT MESSAGE (OUTPATIENT)
Dept: PRIMARY CARE CLINIC | Age: 26
End: 2021-06-18

## 2021-06-23 ENCOUNTER — PATIENT MESSAGE (OUTPATIENT)
Dept: PRIMARY CARE CLINIC | Age: 26
End: 2021-06-23

## 2021-06-23 NOTE — TELEPHONE ENCOUNTER
From: Rui Orantes  Sent: 6/23/2021 11:45 AM EDT  To: Anjel Sparks  Clinical Staff  Subject: RE: Non-Urgent Medical Question    Would I be able to schedule an appointment and get some medicine?    ----- Message -----  From: Harvey Bruno  Sent: 6/21/21, 08:45  To: Rui Orantes  Subject: RE: Non-Urgent Medical Question    Reny Flor,    Depending which one you want to go to. I don't the hours to all the labs. [facility] closes at 4 pm. I would get there by 3:30 pm.  You may have to call wherever you decide to go to check their hours. Jayda Breen       ----- Message -----   From:Jordan Porras   Sent:6/21/2021 8:28 AM EDT   To:DEA Delacruz MD   Subject:RE: Non-Urgent Medical Question    At any time they are open?      ----- Message -----   From:HAIDER Saunders    Sent:6/21/2021 8:19 AM EDT   To:Jordan Tee   Subject:RE: Non-Urgent Medical Question    Amy Espitia. I see the order is still active. The HIV is one test and the Gonorrhea is a urine test.      You can go to any [facility] and get those tests done. Jayda Breen       ----- Message -----   From:Jordan Porras   Sent:6/21/2021 7:28 AM EDT   To:DEA Delacruz MD   Subject:RE: Non-Urgent Medical Question    The C.TRACHOMATIS N.GONORRHOEAE DNA, URINE and the HIV TEST      ----- Message -----   From:HAIDER Saunders   Sent:6/21/2021 6:57 AM EDT   To:Jordan Tee   Subject:RE: Non-Urgent Medical Question    Hi Pauline Barthel do not need an appointment to get blood work. What lab tests are you wanting to do?      ----- Message -----   From:Jordan Tee   Sent:6/18/2021 11:55 PM EDT   Janalyn Essex, MD   [facility], I have a few tests in my future tests section. I would like to know how to proceed with theses tests. Do I schedule an appointment or just show up?

## 2021-06-24 ENCOUNTER — OFFICE VISIT (OUTPATIENT)
Dept: PRIMARY CARE CLINIC | Age: 26
End: 2021-06-24
Payer: COMMERCIAL

## 2021-06-24 VITALS
HEART RATE: 72 BPM | DIASTOLIC BLOOD PRESSURE: 69 MMHG | HEIGHT: 68 IN | BODY MASS INDEX: 26.55 KG/M2 | SYSTOLIC BLOOD PRESSURE: 127 MMHG | WEIGHT: 175.2 LBS

## 2021-06-24 DIAGNOSIS — Z11.4 SCREENING FOR HIV (HUMAN IMMUNODEFICIENCY VIRUS): ICD-10-CM

## 2021-06-24 DIAGNOSIS — J01.21 ACUTE RECURRENT ETHMOIDAL SINUSITIS: Primary | ICD-10-CM

## 2021-06-24 DIAGNOSIS — Z11.59 SCREENING FOR VIRAL AND CHLAMYDIAL DISEASES: ICD-10-CM

## 2021-06-24 DIAGNOSIS — Z11.8 SCREENING FOR VIRAL AND CHLAMYDIAL DISEASES: ICD-10-CM

## 2021-06-24 PROCEDURE — G8419 CALC BMI OUT NRM PARAM NOF/U: HCPCS | Performed by: FAMILY MEDICINE

## 2021-06-24 PROCEDURE — G8427 DOCREV CUR MEDS BY ELIG CLIN: HCPCS | Performed by: FAMILY MEDICINE

## 2021-06-24 PROCEDURE — 99213 OFFICE O/P EST LOW 20 MIN: CPT | Performed by: FAMILY MEDICINE

## 2021-06-24 PROCEDURE — 1036F TOBACCO NON-USER: CPT | Performed by: FAMILY MEDICINE

## 2021-06-24 RX ORDER — CETIRIZINE HYDROCHLORIDE 10 MG/1
10 TABLET ORAL DAILY
COMMUNITY

## 2021-06-24 RX ORDER — AZITHROMYCIN 250 MG/1
250 TABLET, FILM COATED ORAL SEE ADMIN INSTRUCTIONS
Qty: 6 TABLET | Refills: 0 | Status: SHIPPED | OUTPATIENT
Start: 2021-06-24 | End: 2021-06-29

## 2021-06-24 RX ORDER — AZELASTINE 1 MG/ML
2 SPRAY, METERED NASAL 2 TIMES DAILY
Qty: 1 BOTTLE | Refills: 3 | Status: SHIPPED | OUTPATIENT
Start: 2021-06-24 | End: 2021-08-31

## 2021-06-24 RX ORDER — METHYLPREDNISOLONE 4 MG/1
TABLET ORAL
Qty: 1 KIT | Refills: 0 | Status: SHIPPED | OUTPATIENT
Start: 2021-06-24 | End: 2022-05-24

## 2021-06-24 ASSESSMENT — PATIENT HEALTH QUESTIONNAIRE - PHQ9
2. FEELING DOWN, DEPRESSED OR HOPELESS: 0
SUM OF ALL RESPONSES TO PHQ QUESTIONS 1-9: 0
1. LITTLE INTEREST OR PLEASURE IN DOING THINGS: 0
SUM OF ALL RESPONSES TO PHQ QUESTIONS 1-9: 0
SUM OF ALL RESPONSES TO PHQ9 QUESTIONS 1 & 2: 0
SUM OF ALL RESPONSES TO PHQ QUESTIONS 1-9: 0

## 2021-06-24 NOTE — PROGRESS NOTES
Chief Complaint   Patient presents with    Nasal Congestion     X2 days    Headache     X 2 days       HPI: Dionte Salguero presents for evaluation and management of 3-day history of sinus congestion with purulent nasal discharge and sore throat. States he has some pressure between his eyes as well. Today's PHQ:    PHQ Scores 6/24/2021 10/1/2020 2/8/2019 8/31/2018 5/31/2017   PHQ2 Score 0 0 0 0 0   PHQ9 Score 0 0 0 0 0     Interpretation of Total Score Depression Severity: 1-4 = Minimal depression, 5-9 = Mild depression, 10-14 = Moderate depression, 15-19 = Moderately severe depression, 20-27 = Severe depression        Review of Systems    No Known Allergies  New Prescriptions    AZELASTINE (ASTELIN) 0.1 % NASAL SPRAY    2 sprays by Nasal route 2 times daily Use in each nostril as directed    AZITHROMYCIN (ZITHROMAX) 250 MG TABLET    Take 1 tablet by mouth See Admin Instructions for 5 days 500mg on day 1 followed by 250mg on days 2 - 5    METHYLPREDNISOLONE (MEDROL DOSEPACK) 4 MG TABLET    Take by mouth. Current Outpatient Medications   Medication Sig Dispense Refill    cetirizine (ZYRTEC) 10 MG tablet Take 10 mg by mouth daily      azithromycin (ZITHROMAX) 250 MG tablet Take 1 tablet by mouth See Admin Instructions for 5 days 500mg on day 1 followed by 250mg on days 2 - 5 6 tablet 0    methylPREDNISolone (MEDROL DOSEPACK) 4 MG tablet Take by mouth. 1 kit 0    azelastine (ASTELIN) 0.1 % nasal spray 2 sprays by Nasal route 2 times daily Use in each nostril as directed 1 Bottle 3     No current facility-administered medications for this visit.        Past Medical History:   Diagnosis Date    Allergic rhinitis, seasonal     Attention deficit hyperactivity disorder (ADHD), predominantly inattentive type 8/31/2018    Conjunctivitis, allergic          Objective   /69   Pulse 72   Ht 5' 7.7\" (1.72 m)   Wt 175 lb 3.2 oz (79.5 kg)   BMI 26.88 kg/m²   Wt Readings from Last 3 Encounters:   06/24/21 175 lb 3.2 oz (79.5 kg)   11/09/20 170 lb (77.1 kg)   10/01/20 161 lb (73 kg)       Physical Exam  Constitutional:       Appearance: Normal appearance. HENT:      Right Ear: Tympanic membrane normal.      Left Ear: Tympanic membrane normal.      Nose: Congestion present. Comments: Erythematous nasal mucosa with ethmoidal sinus tenderness to percussion  Cardiovascular:      Heart sounds: Normal heart sounds. No murmur heard. Pulmonary:      Effort: Pulmonary effort is normal.      Breath sounds: Normal breath sounds. No rales. Chemistry    No results found for: NA, K, CL, CO2, BUN, CREATININE No results found for: CALCIUM, ALKPHOS, AST, ALT, BILITOT       Lab Results   Component Value Date    WBC 4.3 01/12/2021    HGB 16.9 01/12/2021    HCT 49.6 01/12/2021    MCV 90.1 01/12/2021     01/12/2021     No results found for: LABA1C  No results found for: EAG  No results found for: LABA1C  No components found for: CHLPL  No results found for: TRIG  No results found for: HDL  No results found for: LDLCALC  No results found for: LABVLDL      Assessment   Plan   1. Acute recurrent ethmoidal sinusitis  We will treat with antibiotics steroids and nasal antihistamines. Follow-up if not better in 2 to 4 days-     azithromycin (ZITHROMAX) 250 MG tablet; Take 1 tablet by mouth See Admin Instructions for 5 days 500mg on day 1 followed by 250mg on days 2 - 5, Disp-6 tablet, R-0Normal  -     methylPREDNISolone (MEDROL DOSEPACK) 4 MG tablet; Take by mouth., Disp-1 kit, R-0Normal  -     azelastine (ASTELIN) 0.1 % nasal spray; 2 sprays by Nasal route 2 times daily Use in each nostril as directed, Disp-1 Bottle, R-3Normal     Discussed use, benefit, and side effects of prescribed medications. Barriers to medication compliance addressed. All patient questions answered. Pt voiced understanding. RTC Return if symptoms worsen or fail to improve.

## 2021-06-25 LAB
C. TRACHOMATIS DNA ,URINE: NEGATIVE
HIV AG/AB: NORMAL
HIV ANTIGEN: NORMAL
HIV-1 ANTIBODY: NORMAL
HIV-2 AB: NORMAL
N. GONORRHOEAE DNA, URINE: NEGATIVE

## 2021-08-31 ENCOUNTER — PATIENT MESSAGE (OUTPATIENT)
Dept: PRIMARY CARE CLINIC | Age: 26
End: 2021-08-31

## 2021-08-31 DIAGNOSIS — J01.21 ACUTE RECURRENT ETHMOIDAL SINUSITIS: Primary | ICD-10-CM

## 2021-08-31 RX ORDER — LORATADINE AND PSEUDOEPHEDRINE SULFATE 5; 120 MG/1; MG/1
1 TABLET, EXTENDED RELEASE ORAL 2 TIMES DAILY
Qty: 20 TABLET | Refills: 0 | Status: SHIPPED | OUTPATIENT
Start: 2021-08-31 | End: 2022-08-31

## 2021-08-31 RX ORDER — AZELASTINE 1 MG/ML
2 SPRAY, METERED NASAL 2 TIMES DAILY
Qty: 1 BOTTLE | Refills: 3 | Status: SHIPPED | OUTPATIENT
Start: 2021-08-31 | End: 2021-12-07 | Stop reason: SDUPTHER

## 2021-08-31 NOTE — TELEPHONE ENCOUNTER
From: Ramon Harper  To: Bruno Ch MD  Sent: 8/31/2021 6:52 AM EDT  Subject: Non-Urgent Medical Question    Hello again, I'm have a few sinus issues that point to me having a sinus infection. Runny nose, sore throat from drainage, stomach upset, exhausted even with sleep. Would I be able to get some medicine to help me out?  Thanks

## 2021-09-01 RX ORDER — AZITHROMYCIN 250 MG/1
250 TABLET, FILM COATED ORAL SEE ADMIN INSTRUCTIONS
Qty: 6 TABLET | Refills: 0 | Status: SHIPPED | OUTPATIENT
Start: 2021-09-01 | End: 2021-09-06

## 2021-09-01 NOTE — TELEPHONE ENCOUNTER
From: Malcolm Cottrell  To: Liberty Daily MD  Sent: 8/31/2021 8:03 PM EDT  Subject: Non-Urgent Medical Question    Also, I've already been taking Claritin d and using nose spray, as well as day quil severe but nothing is helping, it's just getting worse

## 2021-09-17 ENCOUNTER — PATIENT MESSAGE (OUTPATIENT)
Dept: PRIMARY CARE CLINIC | Age: 26
End: 2021-09-17

## 2021-09-17 ENCOUNTER — OFFICE VISIT (OUTPATIENT)
Dept: PRIMARY CARE CLINIC | Age: 26
End: 2021-09-17
Payer: COMMERCIAL

## 2021-09-17 VITALS
HEIGHT: 68 IN | DIASTOLIC BLOOD PRESSURE: 69 MMHG | WEIGHT: 179 LBS | SYSTOLIC BLOOD PRESSURE: 121 MMHG | BODY MASS INDEX: 27.13 KG/M2 | HEART RATE: 67 BPM

## 2021-09-17 DIAGNOSIS — Z11.59 NEED FOR HEPATITIS C SCREENING TEST: ICD-10-CM

## 2021-09-17 DIAGNOSIS — Z13.220 SCREENING CHOLESTEROL LEVEL: ICD-10-CM

## 2021-09-17 DIAGNOSIS — Z00.00 WELL ADULT EXAM: Primary | ICD-10-CM

## 2021-09-17 DIAGNOSIS — Z23 NEED FOR VACCINATION: ICD-10-CM

## 2021-09-17 DIAGNOSIS — Z11.1 SCREENING FOR TUBERCULOSIS: ICD-10-CM

## 2021-09-17 PROCEDURE — 99395 PREV VISIT EST AGE 18-39: CPT | Performed by: FAMILY MEDICINE

## 2021-09-17 PROCEDURE — 90674 CCIIV4 VAC NO PRSV 0.5 ML IM: CPT | Performed by: FAMILY MEDICINE

## 2021-09-17 PROCEDURE — 90471 IMMUNIZATION ADMIN: CPT | Performed by: FAMILY MEDICINE

## 2021-09-17 ASSESSMENT — ENCOUNTER SYMPTOMS
COUGH: 0
NAUSEA: 0
EYE PAIN: 0
SHORTNESS OF BREATH: 0
ABDOMINAL PAIN: 0
VOMITING: 0
RHINORRHEA: 0
SORE THROAT: 0
CONSTIPATION: 0
DIARRHEA: 0
COLOR CHANGE: 0

## 2021-09-17 NOTE — PATIENT INSTRUCTIONS
Examine your lifestyle and the barriers to bad and good habits and how you can design your life to make better choices    If you want to feel better these are the FUNDAMENTAL PILLARS of Wellness:    Make it EASY to do the RIGHT THINGS. 1)  You can choose to Get 150 min/week of moderate exercise (can talk but can't sing) or 75 min/week of vigorous exercise (can't talk)   This will enhance your sense of well being (Exercise is as good as medicine for depression.)    2)  You can choose to Get 7-9 hours of sleep per night    Detoxifies your brain, reduces risk of dementia    3)  You can choose to Strength Train 2 x a week on non-consecutive days   This will improve function and reduce risk of injury. Body weight type exercises such as Yoga and Pilates are good    4)  You can choose good nutrition. Only eat your goal weight (in lbs) x 10 calories/day and get 5 servings of Vegetables/day   Plant based diets reduce risk of heart attack/stroke and will help you feel full on less food. Avoid highly processed foods and processed carbohydrates. 5)  You can choose moderate alcohol intake < 1-2 drinks/day   Alcohol will disrupt your sleep and add calories to your day    6)  You can choose to develop a Charismatic/Supportive relationship. This will strengthen your resilience for the ups and downs. 7)  You can choose to Practice Mindfulness. An hour a day of prayer/meditation/gratitude will change your life! If you are trying to lose weight, here are some recommendations for weight loss:  Not every weight loss program is appropriate for everybody. ..  good online sources include Noom (more social with daily check ins), Lifesum (similar but less social) and Naturally slim, as well as Brandneu ($1500)    The GI Diet or \"Primal diet\", Intermittent fasting can also be effective choices. If you have diabetes treated with insulin be sure to ask me for specific guidance around meals.     Take your desired weight in pounds and multiply by 10 and that is your average daily calorie allowance. For example if you wish to weigh 170 lb x 10 = 1700 louisa/day (this is how to gradually lose the weight and maintain your desired weight). Avoid soda/coke and all \"wet carbs\" => Drink ice water instead    Drink a large glass of ice water before meals and EAT SLOWLY (talk while you eat)! Rethink your hunger => it means your losing weight.     Minimize highly processed carbohydrates as they stimulate your appetite:  Specifically cut back on Bread, Rice, Pasta and Potatoes    Avoid eating calories after 6 pm

## 2021-09-17 NOTE — PROGRESS NOTES
Chief Complaint   Patient presents with    ADHD    Other     Acute Sinusitis       HPI:Jordan Merchant presents for evaluation and management of all adult check. 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 He reports that he feels physically fit. He works out [employer] 3-6 times a week with 10 minutes of cardio each time and playing soccer once a week. He gets 6 to 8 hours of sleep a night. He only drinks alcohol on the weekends in moderation. He eats 1 large salad a day and vegetables with dinner if his mom serves it as he still lives at home. He has excellent social support and he is in a long-term committed relationship. Review of Systems   Constitutional: Negative for chills and fever. HENT: Negative for ear pain, rhinorrhea and sore throat. Eyes: Negative for pain and visual disturbance. Respiratory: Negative for cough and shortness of breath. Cardiovascular: Negative for chest pain and palpitations. Gastrointestinal: Negative for abdominal pain, constipation, diarrhea, nausea and vomiting. Genitourinary: Negative for dysuria and frequency. Musculoskeletal: Negative for joint swelling and myalgias. Skin: Negative for color change and rash. Neurological: Negative for weakness, numbness and headaches. Hematological: Negative for adenopathy. Does not bruise/bleed easily. Psychiatric/Behavioral: Negative for dysphoric mood, self-injury and suicidal ideas. The patient is not nervous/anxious.         No Known Allergies  New Prescriptions    No medications on file     Current Outpatient Medications   Medication Sig Dispense Refill    loratadine-pseudoephedrine (CLARITIN-D 12 HOUR) 5-120 MG per extended release tablet Take 1 tablet by mouth 2 times daily 20 tablet 0    cetirizine (ZYRTEC) 10 MG tablet Take 10 mg by mouth daily      azelastine (ASTELIN) 0.1 % nasal spray 2 sprays by Nasal route 2 times daily Use in each nostril as directed (Patient not taking: Reported on 9/17/2021) 1 Bottle 3    methylPREDNISolone (MEDROL DOSEPACK) 4 MG tablet Take by mouth. (Patient not taking: Reported on 9/17/2021) 1 kit 0     No current facility-administered medications for this visit. Past Medical History:   Diagnosis Date    Allergic rhinitis, seasonal     Attention deficit hyperactivity disorder (ADHD), predominantly inattentive type 8/31/2018    Conjunctivitis, allergic      No past surgical history on file. Family History   Problem Relation Age of Onset    No Known Problems Mother     Hypertension Father      Social History     Tobacco Use    Smoking status: Never Smoker    Smokeless tobacco: Never Used    Tobacco comment: counseled on tobacco exposure avoidance   Vaping Use    Vaping Use: Never used   Substance Use Topics    Alcohol use: Yes     Alcohol/week: 0.0 standard drinks     Types: 5 Cans of beer, 3 Shots of liquor per week     Comment: occasaionlly     Drug use: No       Objective   /69   Pulse 67   Ht 5' 7.7\" (1.72 m)   Wt 179 lb (81.2 kg)   BMI 27.46 kg/m²   Wt Readings from Last 3 Encounters:   09/17/21 179 lb (81.2 kg)   06/24/21 175 lb 3.2 oz (79.5 kg)   11/09/20 170 lb (77.1 kg)       Physical Exam  Constitutional:       Appearance: He is well-developed. HENT:      Head: Normocephalic and atraumatic. Nose: Nose normal.      Mouth/Throat:      Pharynx: No oropharyngeal exudate. Eyes:      General: No scleral icterus. Right eye: No discharge. Left eye: No discharge. Pupils: Pupils are equal, round, and reactive to light. Neck:      Thyroid: No thyromegaly. Cardiovascular:      Rate and Rhythm: Normal rate and regular rhythm. Pulses:           Dorsalis pedis pulses are 2+ on the right side and 2+ on the left side. Posterior tibial pulses are 2+ on the right side and 2+ on the left side. Heart sounds: Normal heart sounds. No murmur heard. No friction rub. No gallop.        Comments: No Edema Lower Extremities  Pulmonary:      Effort: Pulmonary effort is normal.      Breath sounds: Normal breath sounds. No wheezing or rales. Abdominal:      General: Bowel sounds are normal. There is no distension. Palpations: Abdomen is soft. There is no hepatomegaly or splenomegaly. Tenderness: There is no abdominal tenderness. There is no guarding or rebound. Musculoskeletal:         General: No tenderness or deformity. Normal range of motion. Cervical back: Normal range of motion and neck supple. Lymphadenopathy:      Cervical: No cervical adenopathy. Skin:     General: Skin is warm and dry. Findings: No erythema or rash. Neurological:      Mental Status: He is alert. Cranial Nerves: No cranial nerve deficit. Sensory: No sensory deficit. Gait: Gait normal.   Psychiatric:         Speech: Speech normal.         Behavior: Behavior normal.       Assessment   Plan   1. Well adult exam   Appears well:  Counselled diet, development, anticipatory guidance and safety issues with patient and or parent(s). Counseled on diet and exercise. Motivational interviewing and removal of barriers for good habits. 2. Screening cholesterol level  -     Lipid Panel; Future  -     Comprehensive Metabolic Panel; Future  3. Need for hepatitis C screening test  -     Hepatitis C Antibody; Future  4. Screening for tuberculosis  -     Quantiferon, Incubated; Future  5. Need for vaccination  -     INFLUENZA, MDCK QUADV, 2 YRS AND OLDER, IM, PF, PREFILL SYR OR SDV, 0.5ML (FLUCELVAX QUADV, PF)     Har[place] Silvino received counseling on the following healthy behaviors: nutrition and exercise    Patient given educational materials on Nutrition and Exercise  Discussed use, benefit, and side effects of prescribed medications. Barriers to medication compliance addressed. All patient questions answered. Pt voiced understanding.          Health Maintenance   Topic Date Due    Hepatitis C screen  Never done  HPV vaccine (1 - Male 2-dose series) Never done    COVID-19 Vaccine (1) Never done    DTaP/Tdap/Td vaccine (7 - Td or Tdap) 03/26/2029    Hepatitis B vaccine  Completed    Varicella vaccine  Completed    Flu vaccine  Completed    HIV screen  Completed    Hepatitis A vaccine  Aged Out    Hib vaccine  Aged Out    Meningococcal (ACWY) vaccine  Aged Out    Pneumococcal 0-64 years Vaccine  Aged Out       RTC yearly and as needed

## 2021-09-20 NOTE — TELEPHONE ENCOUNTER
From: Latasha Martinez  To: Eveline Tolbert MD  Sent: 9/17/2021 5:25 PM EDT  Subject: Non-Urgent Medical Question    Are my levels okay? What can I do to fix them if not?

## 2021-09-29 ENCOUNTER — PATIENT MESSAGE (OUTPATIENT)
Dept: PRIMARY CARE CLINIC | Age: 26
End: 2021-09-29

## 2021-09-29 NOTE — TELEPHONE ENCOUNTER
From: Giovany Pike  To: Govind Fishman MD  Sent: 9/29/2021 1:35 PM EDT  Subject: Non-Urgent Medical Question    So I have been having a runny nose issue for a while. Every time I look down or upside down I leak a clear watery substance out of my left nostril. I do not know how serious this is but I've never had this issue before the last couple months.  Thank you

## 2021-09-30 ENCOUNTER — PATIENT MESSAGE (OUTPATIENT)
Dept: PRIMARY CARE CLINIC | Age: 26
End: 2021-09-30

## 2021-09-30 NOTE — TELEPHONE ENCOUNTER
From: Kevin Blanca  To: Herlinda Lewis MD  Sent: 9/30/2021 8:05 AM EDT  Subject: Non-Urgent Medical Question    It doesn't happen all the time, maybe several times a week. I also have drainage and headaches around the same time it happens. But other than that no other symptoms and I feel fine.

## 2021-10-15 ENCOUNTER — OFFICE VISIT (OUTPATIENT)
Dept: PRIMARY CARE CLINIC | Age: 26
End: 2021-10-15
Payer: COMMERCIAL

## 2021-10-15 VITALS
DIASTOLIC BLOOD PRESSURE: 75 MMHG | BODY MASS INDEX: 27.34 KG/M2 | HEIGHT: 68 IN | HEART RATE: 83 BPM | WEIGHT: 180.4 LBS | SYSTOLIC BLOOD PRESSURE: 125 MMHG

## 2021-10-15 DIAGNOSIS — J34.89 RHINORRHEA: Primary | ICD-10-CM

## 2021-10-15 PROCEDURE — G8419 CALC BMI OUT NRM PARAM NOF/U: HCPCS | Performed by: FAMILY MEDICINE

## 2021-10-15 PROCEDURE — G8427 DOCREV CUR MEDS BY ELIG CLIN: HCPCS | Performed by: FAMILY MEDICINE

## 2021-10-15 PROCEDURE — 1036F TOBACCO NON-USER: CPT | Performed by: FAMILY MEDICINE

## 2021-10-15 PROCEDURE — G8482 FLU IMMUNIZE ORDER/ADMIN: HCPCS | Performed by: FAMILY MEDICINE

## 2021-10-15 PROCEDURE — 99214 OFFICE O/P EST MOD 30 MIN: CPT | Performed by: FAMILY MEDICINE
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
## 2021-12-07 ENCOUNTER — TELEPHONE (OUTPATIENT)
Dept: PRIMARY CARE CLINIC | Age: 26
End: 2021-12-07

## 2022-01-20 ENCOUNTER — PATIENT MESSAGE (OUTPATIENT)
Dept: PRIMARY CARE CLINIC | Age: 27
End: 2022-01-20

## 2022-01-20 DIAGNOSIS — J02.0 STREP THROAT: Primary | ICD-10-CM

## 2022-01-20 RX ORDER — AZITHROMYCIN 250 MG/1
250 TABLET, FILM COATED ORAL SEE ADMIN INSTRUCTIONS
Qty: 6 TABLET | Refills: 0 | Status: SHIPPED | OUTPATIENT
Start: 2022-01-20 | End: 2022-01-25

## 2022-01-20 NOTE — TELEPHONE ENCOUNTER
From: Glynn Berry  To: Dr. Wilmer Vargas: 1/20/2022 10:32 AM EST  Subject: Strep throat symptoms     Hello, I know recently you diagnosed my sister with strep throat and my dad as well. Last night I developed a cough and a scratchy throat. I looked this morning and there was a white spot back there. Would you be able to prescribe me medication?

## 2022-03-03 ENCOUNTER — PATIENT MESSAGE (OUTPATIENT)
Dept: PRIMARY CARE CLINIC | Age: 27
End: 2022-03-03

## 2022-03-04 NOTE — TELEPHONE ENCOUNTER
From: Ying Croft  To: Dr. Duarte Mitchell: 3/3/2022 10:36 PM EST  Subject: Elliott Cola Dr. Debra Mantilla been sick for a week now. My symptoms are nose and throat congestion, sore through (probably from congestion Im guessing), occasional headaches and dizziness, and full body muscle fatigue. Occasionally yellow will come out throat or nose. Christi Grullon been taking Zertek D as well as DayQuil and NyQuil severe. Doesnt seem to be getting better. Thanks for the help.

## 2022-05-23 ENCOUNTER — PATIENT MESSAGE (OUTPATIENT)
Dept: PRIMARY CARE CLINIC | Age: 27
End: 2022-05-23

## 2022-05-24 ENCOUNTER — OFFICE VISIT (OUTPATIENT)
Dept: PRIMARY CARE CLINIC | Age: 27
End: 2022-05-24
Payer: COMMERCIAL

## 2022-05-24 VITALS
TEMPERATURE: 98.1 F | SYSTOLIC BLOOD PRESSURE: 135 MMHG | WEIGHT: 171.8 LBS | HEART RATE: 80 BPM | HEIGHT: 68 IN | DIASTOLIC BLOOD PRESSURE: 83 MMHG | BODY MASS INDEX: 26.04 KG/M2

## 2022-05-24 DIAGNOSIS — S76.211A STRAIN OF RIGHT GROIN: Primary | ICD-10-CM

## 2022-05-24 DIAGNOSIS — R05.9 COUGH: ICD-10-CM

## 2022-05-24 PROCEDURE — G8419 CALC BMI OUT NRM PARAM NOF/U: HCPCS | Performed by: FAMILY MEDICINE

## 2022-05-24 PROCEDURE — G8427 DOCREV CUR MEDS BY ELIG CLIN: HCPCS | Performed by: FAMILY MEDICINE

## 2022-05-24 PROCEDURE — 99214 OFFICE O/P EST MOD 30 MIN: CPT | Performed by: FAMILY MEDICINE

## 2022-05-24 PROCEDURE — 1036F TOBACCO NON-USER: CPT | Performed by: FAMILY MEDICINE

## 2022-05-24 RX ORDER — MONTELUKAST SODIUM 10 MG/1
10 TABLET ORAL DAILY
Qty: 30 TABLET | Refills: 3 | Status: SHIPPED | OUTPATIENT
Start: 2022-05-24 | End: 2022-08-23

## 2022-05-24 RX ORDER — ALBUTEROL SULFATE 90 UG/1
2 AEROSOL, METERED RESPIRATORY (INHALATION) EVERY 6 HOURS PRN
Qty: 18 G | Refills: 3 | Status: SHIPPED | OUTPATIENT
Start: 2022-05-24 | End: 2022-08-11

## 2022-05-24 SDOH — ECONOMIC STABILITY: FOOD INSECURITY: WITHIN THE PAST 12 MONTHS, THE FOOD YOU BOUGHT JUST DIDN'T LAST AND YOU DIDN'T HAVE MONEY TO GET MORE.: NEVER TRUE

## 2022-05-24 SDOH — ECONOMIC STABILITY: FOOD INSECURITY: WITHIN THE PAST 12 MONTHS, THE FOOD YOU BOUGHT JUST DIDN'T LAST AND YOU DIDN'T HAVE MONEY TO GET MORE.: OFTEN TRUE

## 2022-05-24 SDOH — ECONOMIC STABILITY: FOOD INSECURITY: WITHIN THE PAST 12 MONTHS, YOU WORRIED THAT YOUR FOOD WOULD RUN OUT BEFORE YOU GOT MONEY TO BUY MORE.: NEVER TRUE

## 2022-05-24 SDOH — ECONOMIC STABILITY: FOOD INSECURITY: WITHIN THE PAST 12 MONTHS, YOU WORRIED THAT YOUR FOOD WOULD RUN OUT BEFORE YOU GOT MONEY TO BUY MORE.: OFTEN TRUE

## 2022-05-24 ASSESSMENT — SOCIAL DETERMINANTS OF HEALTH (SDOH): HOW HARD IS IT FOR YOU TO PAY FOR THE VERY BASICS LIKE FOOD, HOUSING, MEDICAL CARE, AND HEATING?: NOT HARD AT ALL

## 2022-05-24 NOTE — PATIENT INSTRUCTIONS
Patient Education        Groin Strain: Care Instructions  Your Care Instructions  A groin strain is an injury that happens when you tear or overstretch (pull) a groin muscle. The groin muscles are in the area on either side of the body in the folds where the belly joins the legs. You can strain a groin muscle during exercise, such as running, skating, kicking in soccer, or playing basketball. It can happen when you lift, push, or pull heavy objects. You might pull a groin muscle when you fall. The injury can range from a minor pull to a moreserious tear of the muscle. You may feel pain and tenderness that's worse when you squeeze your legs together. You may also have pain when you raise the knee of the injured side. There may be swelling or bruising in the groin area or inner thigh. If you havea bad strain, you may walk with a limp while it heals. Rest and other home care can help the muscle heal. Healing can take up to 3weeks or more. Your doctor may want to see you again in 2 to 3 weeks. Follow-up care is a key part of your treatment and safety. Be sure to make and go to all appointments, and call your doctor if you are having problems. It's also a good idea to know your test results and keep alist of the medicines you take. How can you care for yourself at home?  Be safe with medicines. Read and follow all instructions on the label. ? If the doctor gave you a prescription medicine for pain, take it as prescribed. ? If you are not taking a prescription pain medicine, ask your doctor if you can take an over-the-counter medicine.  Rest and protect your injured or sore groin area for 1 to 2 weeks. Stop, change, or take a break from any activity that may be causing your pain or soreness. Do not do intense activities while you still have pain.  Put ice or a cold pack on your groin area for 10 to 20 minutes at a time.  Try to do this every 1 to 2 hours for the next 3 days (when you are awake) or until the swelling goes down. Put a thin cloth between the ice and your skin.  After 2 or 3 days, if your swelling is gone, apply heat. Put a warm water bottle, a heating pad set on low, or a warm cloth on your groin area. Do not go to sleep with a heating pad on your skin.  If your doctor gave you crutches, make sure you use them as directed.  Wear snug shorts or underwear that support the injured area. When should you call for help? Call your doctor now or seek immediate medical care if:     You have new or severe pain or swelling in the groin area.      Your groin or upper thigh is cool or pale or changes color.      You have tingling, weakness, or numbness in your groin or leg.      You cannot move your leg.      You cannot put weight on your leg. Watch closely for changes in your health, and be sure to contact your doctor if:     You do not get better as expected. Where can you learn more? Go to [email or web address] and sign in to your [employer] account. Enter T696 in the [employer] box to learn more about \"Groin Strain: Care Instructions. \"     If you do not have an account, please click on the \"Sign Up Now\" link. Current as of: July 1, 2021               Content Version: 13.2  © 2006-2022 Healthwise, Incorporated. Care instructions adapted under license by [facility] ([place] [facility]). If you have questions about a medical condition or this instruction, always ask your healthcare professional. [place] [address] any warranty or liability for your use of this information.

## 2022-05-24 NOTE — TELEPHONE ENCOUNTER
From: Juhi Jarquin  To: Dr. Rafael Cueto: 5/23/2022 10:56 PM EDT  Subject: Having issues    Hello, Im having two separate issues that are concerning to me. 1) Letty had a cough for about 4 to 5 days now, some times it turns into a coughing fit and wheezing. Sometimes I cough up a clear substance, maybe mucus. It is probably allergies or maybe not Im not sure. I have been taking Zyrtec D 12 hour every day and I will take NyQuil at night depending on how I feel. Im also doing the nose sprays. Is there anything else I can take? Or should I come in to see you?    2) A while back I came in because I had a pain in my right privates area, the last month or so the pain has started to come back and its been getting worse, I would like to come in and have you take a look at it. Thank you.

## 2022-05-24 NOTE — PROGRESS NOTES
Chief Complaint   Patient presents with    Cough     Pt states this cough has been going on since last thursday     Testicle Pain     Right sided. Pt states this has been going on for quite a while now     Nasal Congestion    Congestion       HPI: Ray Wang  presents for evaluation and management of 1 week history of cough congestion. Notes his cough has become so severe that it is keeping him up at night. He notes that he has been using his nasal spray and Claritin-D or Zyrtec without much benefit. He denies any shortness of breath. He notes that when he coughs he is having right groin pain. He had similar pain a couple years ago. Notes no masses but pain comes up from his testicle and moves laterally across his groin. Notes no specific injury in the last several weeks. Review of Systems    No Known Allergies  New Prescriptions    ALBUTEROL SULFATE HFA (PROAIR HFA) 108 (90 BASE) MCG/ACT INHALER    Inhale 2 puffs into the lungs every 6 hours as needed for Wheezing    MONTELUKAST (SINGULAIR) 10 MG TABLET    Take 1 tablet by mouth daily     Current Outpatient Medications   Medication Sig Dispense Refill    montelukast (SINGULAIR) 10 MG tablet Take 1 tablet by mouth daily 30 tablet 3    albuterol sulfate HFA (PROAIR HFA) 108 (90 Base) MCG/ACT inhaler Inhale 2 puffs into the lungs every 6 hours as needed for Wheezing 18 g 3    azelastine (ASTELIN) 0.1 % nasal spray 2 sprays by Nasal route 2 times daily Use in each nostril as directed 30 mL 1    loratadine-pseudoephedrine (CLARITIN-D 12 HOUR) 5-120 MG per extended release tablet Take 1 tablet by mouth 2 times daily 20 tablet 0    cetirizine (ZYRTEC) 10 MG tablet Take 10 mg by mouth daily        No current facility-administered medications for this visit.        Past Medical History:   Diagnosis Date    Allergic rhinitis, seasonal     Attention deficit hyperactivity disorder (ADHD), predominantly inattentive type 8/31/2018    Conjunctivitis, allergic          Objective   /83   Pulse 80   Temp 98.1 °F (36.7 °C) (Axillary)   Ht 5' 7.7\" (1.72 m)   Wt 171 lb 12.8 oz (77.9 kg)   BMI 26.35 kg/m²   Wt Readings from Last 3 Encounters:   05/24/22 171 lb 12.8 oz (77.9 kg)   10/15/21 180 lb 6.4 oz (81.8 kg)   09/17/21 179 lb (81.2 kg)       Physical Exam  Constitutional:       Appearance: He is well-developed. Cardiovascular:      Rate and Rhythm: Normal rate and regular rhythm. Heart sounds: No murmur heard. No friction rub. No gallop. Pulmonary:      Effort: Pulmonary effort is normal.      Breath sounds: Normal breath sounds. No wheezing or rales. Abdominal:      General: Bowel sounds are normal. There is no distension. Palpations: Abdomen is soft. There is no mass. Tenderness: There is no abdominal tenderness. Hernia: No hernia is present. Genitourinary:     Penis: Normal.       Testes: Normal.      Comments: No inguinal hernia noted  Skin:     General: Skin is warm and dry. Findings: No rash.            Chemistry        Component Value Date/Time     09/17/2021 0758    K 4.1 09/17/2021 0758     09/17/2021 0758    CO2 24 09/17/2021 0758    BUN 19 09/17/2021 0758    CREATININE 1.1 09/17/2021 0758        Component Value Date/Time    CALCIUM 10.0 09/17/2021 0758    ALKPHOS 59 09/17/2021 0758    AST 33 09/17/2021 0758    ALT 36 09/17/2021 0758    BILITOT 1.3 (H) 09/17/2021 0758          Lab Results   Component Value Date    WBC 4.3 01/12/2021    HGB 16.9 01/12/2021    HCT 49.6 01/12/2021    MCV 90.1 01/12/2021     01/12/2021     No results found for: LABA1C  No results found for: EAG  No results found for: LABA1C  No components found for: CHLPL  Lab Results   Component Value Date    TRIG 44 09/17/2021     Lab Results   Component Value Date    HDL 62 (H) 09/17/2021     Lab Results   Component Value Date    LDLCALC 60 09/17/2021     Lab Results   Component Value Date    LABVLDL 9 09/17/2021 Assessment   Plan   1. Strain of right groin  If patient has a hernia, it is quite small. I suspect he has tore couple fibers of his groin muscles and that is why he is having pain. We will observe and if he develops a mass I would recommend surgical evaluation    2. Cough  Highly suspicious for asthma. We will treat with albuterol and Singulair and sent for pulmonary function test.  Follow-up in 1 month or as needed  - montelukast (SINGULAIR) 10 MG tablet; Take 1 tablet by mouth daily  Dispense: 30 tablet; Refill: 3  - albuterol sulfate HFA (PROAIR HFA) 108 (90 Base) MCG/ACT inhaler; Inhale 2 puffs into the lungs every 6 hours as needed for Wheezing  Dispense: 18 g; Refill: 3  - Full PFT Study With Bronchodilator; Future        RTC Return in about 1 month (around 6/24/2022).

## 2022-05-26 ENCOUNTER — PATIENT MESSAGE (OUTPATIENT)
Dept: PRIMARY CARE CLINIC | Age: 27
End: 2022-05-26

## 2022-05-26 DIAGNOSIS — R05.9 COUGH: Primary | ICD-10-CM

## 2022-05-26 RX ORDER — AZITHROMYCIN 250 MG/1
250 TABLET, FILM COATED ORAL SEE ADMIN INSTRUCTIONS
Qty: 6 TABLET | Refills: 0 | Status: SHIPPED | OUTPATIENT
Start: 2022-05-26 | End: 2022-05-31

## 2022-05-26 RX ORDER — METHYLPREDNISOLONE 4 MG/1
TABLET ORAL
Qty: 1 KIT | Refills: 0 | Status: SHIPPED | OUTPATIENT
Start: 2022-05-26 | End: 2022-06-01

## 2022-05-26 NOTE — TELEPHONE ENCOUNTER
From: Ede Masters  To: Dr. Lawson Perrin: 5/26/2022 6:17 AM EDT  Subject: Persistent cough    Its been 2 days since I have seen you last. Nothing I have been taking will make my cough go away. Im constantly hacking up clear or white mucus. Sometimes it feels like my throat is itchy but I cant cough as well. Every cough Im hacking up this substance. I havent been able to sleep at all the last 4 nights. I feel fine otherwise besides the congestion and running nose. Is there anything I can do?

## 2022-07-08 ENCOUNTER — PATIENT MESSAGE (OUTPATIENT)
Dept: PRIMARY CARE CLINIC | Age: 27
End: 2022-07-08

## 2022-07-08 DIAGNOSIS — R13.10 DYSPHAGIA, UNSPECIFIED TYPE: Primary | ICD-10-CM

## 2022-07-11 RX ORDER — PANTOPRAZOLE SODIUM 40 MG/1
40 TABLET, DELAYED RELEASE ORAL DAILY
Qty: 30 TABLET | Refills: 11 | Status: SHIPPED | OUTPATIENT
Start: 2022-07-11 | End: 2023-07-11

## 2022-07-11 NOTE — TELEPHONE ENCOUNTER
From: Billy Hernandez  To: Dr. Alba Layton: 7/8/2022 3:10 PM EDT  Subject: Having trouble with food intake    Hello, I have been having trouble with my food intake. When I eat bigger meals, sometimes it feels like it gets stuck in my throat. I start coughing and its hard to swallow. I also cough stuff up as well. I know my dad takes a medication for something like this. Im hoping you can help me thank you.

## 2022-07-19 ENCOUNTER — HOSPITAL ENCOUNTER (EMERGENCY)
Age: 27
Discharge: HOME | End: 2022-07-19
Payer: COMMERCIAL

## 2022-07-19 VITALS
BODY MASS INDEX: 26.32 KG/M2 | DIASTOLIC BLOOD PRESSURE: 96 MMHG | SYSTOLIC BLOOD PRESSURE: 144 MMHG | OXYGEN SATURATION: 100 % | TEMPERATURE: 97.9 F | WEIGHT: 177.7 LBS | HEART RATE: 86 BPM | RESPIRATION RATE: 16 BRPM | HEIGHT: 69 IN

## 2022-07-19 DIAGNOSIS — R09.89 GLOBUS SENSATION: Primary | ICD-10-CM

## 2022-07-19 PROCEDURE — 93005 ELECTROCARDIOGRAM TRACING: CPT | Performed by: NURSE PRACTITIONER

## 2022-07-19 PROCEDURE — 6370000000 HC RX 637 (ALT 250 FOR IP): Performed by: NURSE PRACTITIONER

## 2022-07-19 PROCEDURE — 99283 EMERGENCY DEPT VISIT LOW MDM: CPT

## 2022-07-19 RX ADMIN — ALUMINUM HYDROXIDE, MAGNESIUM HYDROXIDE, AND SIMETHICONE: 200; 200; 20 SUSPENSION ORAL at 21:51

## 2022-07-19 ASSESSMENT — ENCOUNTER SYMPTOMS
CHEST TIGHTNESS: 0
NAUSEA: 0
ABDOMINAL PAIN: 0
TROUBLE SWALLOWING: 0
DIARRHEA: 0
CHOKING: 0
COUGH: 0
VOMITING: 0
SHORTNESS OF BREATH: 0

## 2022-07-19 ASSESSMENT — PAIN SCALES - GENERAL: PAINLEVEL_OUTOF10: 8

## 2022-07-19 ASSESSMENT — PAIN - FUNCTIONAL ASSESSMENT: PAIN_FUNCTIONAL_ASSESSMENT: 0-10

## 2022-07-19 NOTE — Clinical Note
Lottie Bolod was seen and treated in our emergency department on 7/19/2022. He may return to work on 07/20/2022. If you have any questions or concerns, please don't hesitate to call.       Hannah Luque, APRN - CNP

## 2022-07-20 LAB
EKG ATRIAL RATE: 73 BPM
EKG DIAGNOSIS: NORMAL
EKG P AXIS: 66 DEGREES
EKG P-R INTERVAL: 142 MS
EKG Q-T INTERVAL: 358 MS
EKG QRS DURATION: 92 MS
EKG QTC CALCULATION (BAZETT): 394 MS
EKG R AXIS: 69 DEGREES
EKG T AXIS: 42 DEGREES
EKG VENTRICULAR RATE: 73 BPM

## 2022-07-20 PROCEDURE — 93010 ELECTROCARDIOGRAM REPORT: CPT | Performed by: INTERNAL MEDICINE

## 2022-07-20 NOTE — ED PROVIDER NOTES
EKG Interpretation    Interpreted by emergency department physician    Rhythm: normal sinus   Rate: normal  Axis: normal  Ectopy: none  Conduction: normal  ST Segments: no acute change  T Waves: no acute change  Q Waves: none    Clinical Impression: Normal sinus rhythm    MD Adrienne Anderson MD  07/20/22 0663

## 2022-07-20 NOTE — ED PROVIDER NOTES
[facility]        Pt Name: Benito Crawford  MRN: [id number]  [place] 1995  Date of evaluation: 7/19/2022  Provider: NADIYA Garza - DEDRICK  PCP: Stephanie Sun MD  Note Started: 10:03 PM EDT       BUTCH. I have evaluated this patient. My supervising physician was available for consultation. CHIEF COMPLAINT       Chief Complaint   Patient presents with    Foreign Body     Pt states he \"has a dorito stuck in throat\" able to keep fluids down. Pt hx gerd       HISTORY OF PRESENT ILLNESS   (Location, Timing/Onset, Context/Setting, Quality, Duration, Modifying Factors, Severity, Associated Signs and Symptoms)  Note limiting factors. Chief Complaint: Possible foreign body in throat    Benito Crawford is a 24 y.o. male who presents to the emergency department complaints that he believes there is a chip stuck in his throat from dinner tonight. Patient states that he is been having issues with swallowing over the last week and was given Pepcid from his PCP. Patient denies relief following use of Pepcid. Patient denies difficulty swallowing but endorses pain upon swallowing. Patient has been trying to drink water and does not feel like it is helping. Denies any headache, fever, lightheadedness, dizziness, visual disturbances. No chest pain or pressure. No neck or back pain. No shortness of breath, cough, or congestion. No abdominal pain, nausea, vomiting, diarrhea, constipation, or dysuria. No rash. Nursing Notes were all reviewed and agreed with or any disagreements were addressed in the HPI. REVIEW OF SYSTEMS    (2-9 systems for level 4, 10 or more for level 5)     Review of Systems   Constitutional:  Negative for activity change, chills and fever. HENT:  Negative for trouble swallowing. Respiratory:  Negative for cough, choking, chest tightness and shortness of breath.     Cardiovascular:  Negative for chest pain.   Gastrointestinal:  Negative for abdominal pain, diarrhea, nausea and vomiting. Genitourinary:  Negative for dysuria. All other systems reviewed and are negative. Positives and Pertinent negatives as per HPI. Except as noted above in the ROS, all other systems were reviewed and negative. PAST MEDICAL HISTORY     Past Medical History:   Diagnosis Date    Allergic rhinitis, seasonal     Attention deficit hyperactivity disorder (ADHD), predominantly inattentive type 8/31/2018    Conjunctivitis, allergic          SURGICAL HISTORY     Past Surgical History:   Procedure Laterality Date    NASAL FRACTURE SURGERY  07/14/2021    with septoplasty         CURRENTMEDICATIONS       Discharge Medication List as of 7/19/2022 10:20 PM        CONTINUE these medications which have NOT CHANGED    Details   pantoprazole (PROTONIX) 40 MG tablet Take 1 tablet by mouth daily, Disp-30 tablet, R-11Normal      montelukast (SINGULAIR) 10 MG tablet Take 1 tablet by mouth daily, Disp-30 tablet, R-3Normal      albuterol sulfate HFA (PROAIR HFA) 108 (90 Base) MCG/ACT inhaler Inhale 2 puffs into the lungs every 6 hours as needed for Wheezing, Disp-18 g, R-3Normal      azelastine (ASTELIN) 0.1 % nasal spray 2 sprays by Nasal route 2 times daily Use in each nostril as directed, Disp-30 mL, R-1Normal      loratadine-pseudoephedrine (CLARITIN-D 12 HOUR) 5-120 MG per extended release tablet Take 1 tablet by mouth 2 times daily, Disp-20 tablet, R-0Normal      cetirizine (ZYRTEC) 10 MG tablet Take 10 mg by mouth daily Historical Med               ALLERGIES     Patient has no known allergies.     FAMILYHISTORY       Family History   Problem Relation Age of Onset    No Known Problems Mother     Hypertension Father           SOCIAL HISTORY       Social History     Tobacco Use    Smoking status: Never    Smokeless tobacco: Never    Tobacco comments:     counseled on tobacco exposure avoidance   Vaping Use    Vaping Use: Never used Substance Use Topics    Alcohol use: Yes     Alcohol/week: 0.0 standard drinks     Types: 5 Cans of beer, 3 Shots of liquor per week     Comment: occasaionlly     Drug use: No       SCREENINGS             PHYSICAL EXAM    (up to 7 for level 4, 8 or more for level 5)     ED Triage Vitals [07/19/22 2133]   BP Temp Temp Source Heart Rate Resp SpO2 Height Weight   (!) 144/96 97.9 °F (36.6 °C) Oral 86 16 100 % 5' 9\" (1.753 m) 177 lb 11.2 oz (80.6 kg)       Physical Exam  Vitals and nursing note reviewed. Constitutional:       Appearance: He is well-developed. He is not diaphoretic. HENT:      Head: Normocephalic and atraumatic. Right Ear: External ear normal.      Left Ear: External ear normal.      Mouth/Throat:      Mouth: Mucous membranes are moist.      Pharynx: Oropharynx is clear. No pharyngeal swelling, oropharyngeal exudate or posterior oropharyngeal erythema. Tonsils: No tonsillar exudate. Eyes:      General:         Right eye: No discharge. Left eye: No discharge. Neck:      Vascular: No JVD. Cardiovascular:      Rate and Rhythm: Normal rate and regular rhythm. Pulses: Normal pulses. Heart sounds: Normal heart sounds. Pulmonary:      Effort: Pulmonary effort is normal. No respiratory distress. Breath sounds: Normal breath sounds. Musculoskeletal:         General: Normal range of motion. Skin:     General: Skin is warm and dry. Coloration: Skin is not pale. Neurological:      Mental Status: He is alert. Psychiatric:         Behavior: Behavior normal.       DIAGNOSTIC RESULTS   LABS:    Labs Reviewed - No data to display    When ordered only abnormal lab results are displayed. All other labs were within normal range or not returned as of this dictation. EKG: When ordered, EKG's are interpreted by the Emergency Department Physician in the absence of a cardiologist.  Please see their note for interpretation of EKG.     RADIOLOGY:   Non-plain film images such as CT, Ultrasound and MRI are read by the radiologist. Plain radiographic images are visualized and preliminarily interpreted by the ED Provider with the below findings:        Interpretation per the Radiologist below, if available at the time of this note:    No orders to display     No results found. PROCEDURES   Unless otherwise noted below, none     Procedures    CRITICAL CARE TIME       CONSULTS:  None      EMERGENCY DEPARTMENT COURSE and DIFFERENTIAL DIAGNOSIS/MDM:   Vitals:    Vitals:    07/19/22 2133   BP: (!) 144/96   Pulse: 86   Resp: 16   Temp: 97.9 °F (36.6 °C)   TempSrc: Oral   SpO2: 100%   Weight: 177 lb 11.2 oz (80.6 kg)   Height: 5' 9\" (1.753 m)       Patient was given the following medications:  Medications   aluminum & magnesium hydroxide-simethicone (MAALOX) 30 mL, lidocaine viscous hcl (XYLOCAINE) 5 mL (GI COCKTAIL) ( Oral Given 7/19/22 2151)         Is this patient to be included in the SEP-1 Core Measure due to severe sepsis or septic shock? No   Exclusion criteria - the patient is NOT to be included for SEP-1 Core Measure due to: Infection is not suspected    Briefly, this is a 79-year-old male with past medical history of allergic rhinitis, ADHD, and conjunctivitis. Patient presents to the emergency department today with complaints that he feels there is a chip stuck in his throat from dinner. Patient states he has been having getting food down over the last week and has been seeing his PCP for this. He was sent home with Pepcid but denies relief following use of Pepcid. No visible foreign body in the throat. Patient was given GI cocktail. He does appear to have globus sensation. We did discuss measures that could be used at home to reduce symptoms of GERD including stopping smoking, no alcohol, and limit eating before bed.     He is given information to follow-up with primary care, states that he actually already has an appointment scheduled for this Thursday, this is excellent and should keep the appointment. If globus sensation continues or worsens, repeat visit would be warranted. FINAL IMPRESSION      1. Globus sensation          DISPOSITION/PLAN   DISPOSITION Decision To Discharge 07/19/2022 10:12:31 PM      PATIENT REFERRED TO:  Jeanine Boudreaux MD  [address] [place] [address] Johana Herzog 72  [phone]    Schedule an appointment as soon as possible for a visit       Follow up with family GI doctor.           DISCHARGE MEDICATIONS:  Discharge Medication List as of 7/19/2022 10:20 PM          DISCONTINUED MEDICATIONS:  Discharge Medication List as of 7/19/2022 10:20 PM                 (Please note that portions of this note were completed with a voice recognition program.  Efforts were made to edit the dictations but occasionally words are mis-transcribed.)    NADIYA Aranda CNP (electronically signed)           NADIYA Aranda CNP  07/19/22 0383

## 2022-07-21 ENCOUNTER — OFFICE VISIT (OUTPATIENT)
Dept: PRIMARY CARE CLINIC | Age: 27
End: 2022-07-21
Payer: COMMERCIAL

## 2022-07-21 VITALS
HEART RATE: 73 BPM | HEIGHT: 69 IN | DIASTOLIC BLOOD PRESSURE: 76 MMHG | SYSTOLIC BLOOD PRESSURE: 126 MMHG | BODY MASS INDEX: 26.36 KG/M2 | WEIGHT: 178 LBS | TEMPERATURE: 97 F

## 2022-07-21 DIAGNOSIS — R13.10 DYSPHAGIA, UNSPECIFIED TYPE: Primary | ICD-10-CM

## 2022-07-21 DIAGNOSIS — M79.671 RIGHT FOOT PAIN: ICD-10-CM

## 2022-07-21 PROCEDURE — 99214 OFFICE O/P EST MOD 30 MIN: CPT | Performed by: FAMILY MEDICINE

## 2022-07-21 ASSESSMENT — PATIENT HEALTH QUESTIONNAIRE - PHQ9
SUM OF ALL RESPONSES TO PHQ QUESTIONS 1-9: 0
SUM OF ALL RESPONSES TO PHQ9 QUESTIONS 1 & 2: 0
2. FEELING DOWN, DEPRESSED OR HOPELESS: 0
SUM OF ALL RESPONSES TO PHQ QUESTIONS 1-9: 0
1. LITTLE INTEREST OR PLEASURE IN DOING THINGS: 0

## 2022-07-21 NOTE — PROGRESS NOTES
Chief Complaint   Patient presents with    Gastroesophageal Reflux     Was seen in the er for this reason, right foot for 2 weeks          HPI: Dominga Pond  presents for evaluation and management of several week history of food sticking in his throat as well as 2-week history of right burning foot pain. Richard notes that he has had a sensation of food sticking in his throat when he eats for several weeks. I started him on Protonix about a month ago and he notes he is still having the symptoms. He ate a Doritos a couple days ago and symptoms was even worse and it gave him a panic attack. He went to the emergency room and was told that it likely just scratched his esophagus. He does take other pills but tells me he always takes them with water. He has not had any weight loss. He also notes a 2-week history of burning right foot pain over the lateral aspect of his proximal foot. He notes no significant change in his walking and denies any particular injury.     Today's PHQ:    PHQ Scores 7/21/2022 6/24/2021 10/1/2020 2/8/2019 8/31/2018 5/31/2017   PHQ2 Score 0 0 0 0 0 0   PHQ9 Score 0 0 0 0 0 0     Interpretation of Total Score Depression Severity: 1-4 = Minimal depression, 5-9 = Mild depression, 10-14 = Moderate depression, 15-19 = Moderately severe depression, 20-27 = Severe depression        Review of Systems    No Known Allergies  New Prescriptions    No medications on file     Current Outpatient Medications   Medication Sig Dispense Refill    pantoprazole (PROTONIX) 40 MG tablet Take 1 tablet by mouth daily 30 tablet 11    montelukast (SINGULAIR) 10 MG tablet Take 1 tablet by mouth daily 30 tablet 3    albuterol sulfate HFA (PROAIR HFA) 108 (90 Base) MCG/ACT inhaler Inhale 2 puffs into the lungs every 6 hours as needed for Wheezing 18 g 3    azelastine (ASTELIN) 0.1 % nasal spray 2 sprays by Nasal route 2 times daily Use in each nostril as directed 30 mL 1    loratadine-pseudoephedrine (CLARITIN-D 12 HOUR) 5-120 MG per extended release tablet Take 1 tablet by mouth 2 times daily 20 tablet 0    cetirizine (ZYRTEC) 10 MG tablet Take 10 mg by mouth daily        No current facility-administered medications for this visit. Past Medical History:   Diagnosis Date    Allergic rhinitis, seasonal     Attention deficit hyperactivity disorder (ADHD), predominantly inattentive type 8/31/2018    Conjunctivitis, allergic          Objective   /76 (Site: Left Upper Arm, Position: Sitting, Cuff Size: Large Adult)   Pulse 73   Temp 97 °F (36.1 °C) (Temporal)   Ht 5' 9\" (1.753 m)   Wt 178 lb (80.7 kg)   BMI 26.29 kg/m²   Wt Readings from Last 3 Encounters:   07/21/22 178 lb (80.7 kg)   07/19/22 177 lb 11.2 oz (80.6 kg)   05/24/22 171 lb 12.8 oz (77.9 kg)       Physical Exam  Constitutional:       Appearance: He is well-developed. Cardiovascular:      Rate and Rhythm: Normal rate and regular rhythm. Heart sounds: No murmur heard. No friction rub. No gallop. Pulmonary:      Effort: Pulmonary effort is normal.      Breath sounds: Normal breath sounds. No wheezing or rales. Abdominal:      General: Bowel sounds are normal. There is no distension. Palpations: Abdomen is soft. There is no mass. Tenderness: There is no abdominal tenderness. Musculoskeletal:      Comments: Bony tenderness over right proximal fifth metatarsal and cuboid bones of right foot. No discoloration no swelling   Skin:     General: Skin is warm and dry. Findings: No rash.          Chemistry        Component Value Date/Time     09/17/2021 0758    K 4.1 09/17/2021 0758     09/17/2021 0758    CO2 24 09/17/2021 0758    BUN 19 09/17/2021 0758    CREATININE 1.1 09/17/2021 0758        Component Value Date/Time    CALCIUM 10.0 09/17/2021 0758    ALKPHOS 59 09/17/2021 0758    AST 33 09/17/2021 0758    ALT 36 09/17/2021 0758    BILITOT 1.3 (H) 09/17/2021 0758          Lab Results   Component Value Date    WBC 4.3 01/12/2021 HGB 16.9 01/12/2021    HCT 49.6 01/12/2021    MCV 90.1 01/12/2021     01/12/2021     No results found for: LABA1C  No results found for: EAG  No results found for: LABA1C  No components found for: CHLPL  Lab Results   Component Value Date    TRIG 44 09/17/2021     Lab Results   Component Value Date    HDL 62 (H) 09/17/2021     Lab Results   Component Value Date    LDLCALC 60 09/17/2021     Lab Results   Component Value Date    LABVLDL 9 09/17/2021         Assessment   Plan   1. Dysphagia, unspecified type  Not improving despite use of Protonix. We will refer to Dr. Kaushal Crandall to be evaluated possibly with EGD  - AFL - Michell Katz MD, Gastroenterology, [facility]    2. Right foot pain  Bony tenderness is somewhat concerning for fracture. Neuropathic pain is also possibility. We will check x-ray of his foot. Follow-up pending result  - XR FOOT RIGHT (MIN 3 VIEWS); Future        RTC No follow-ups on file.

## 2022-07-21 NOTE — PATIENT INSTRUCTIONS
[address] [facility] MD [address], [address]  Phone: [phone]  Fax: [phone]

## 2022-08-09 ENCOUNTER — PATIENT MESSAGE (OUTPATIENT)
Dept: PRIMARY CARE CLINIC | Age: 27
End: 2022-08-09

## 2022-08-09 DIAGNOSIS — G89.29 CHRONIC SHOULDER PAIN, UNSPECIFIED LATERALITY: Primary | ICD-10-CM

## 2022-08-09 DIAGNOSIS — M25.519 CHRONIC SHOULDER PAIN, UNSPECIFIED LATERALITY: Primary | ICD-10-CM

## 2022-08-09 NOTE — TELEPHONE ENCOUNTER
From: Antonina Rose  To: Dr. Milena Watters: 8/9/2022 3:53 PM EDT  Subject: Arm issues    Hello. I have an old right arm/shoulder issue that has resurfaced in the last week. It was determined a while ago that it was probably my shoulder or rotator cuff. I got a X-Ray but not an MRI. The X-Ray came back showing nothing. Is there a good orthopedic place you would recommend going to, to get it looked at. Thank you.

## 2022-08-11 ENCOUNTER — OFFICE VISIT (OUTPATIENT)
Dept: ORTHOPEDIC SURGERY | Age: 27
End: 2022-08-11
Payer: COMMERCIAL

## 2022-08-11 VITALS — BODY MASS INDEX: 25.92 KG/M2 | WEIGHT: 175 LBS | HEIGHT: 69 IN

## 2022-08-11 DIAGNOSIS — M75.81 TENDINITIS OF RIGHT ROTATOR CUFF: ICD-10-CM

## 2022-08-11 DIAGNOSIS — M75.21 BICEPS TENDINITIS OF RIGHT UPPER EXTREMITY: ICD-10-CM

## 2022-08-11 DIAGNOSIS — M25.511 ACUTE PAIN OF RIGHT SHOULDER: Primary | ICD-10-CM

## 2022-08-11 DIAGNOSIS — M75.41 SHOULDER IMPINGEMENT, RIGHT: ICD-10-CM

## 2022-08-11 PROCEDURE — G8427 DOCREV CUR MEDS BY ELIG CLIN: HCPCS | Performed by: FAMILY MEDICINE

## 2022-08-11 PROCEDURE — 99203 OFFICE O/P NEW LOW 30 MIN: CPT | Performed by: FAMILY MEDICINE

## 2022-08-11 PROCEDURE — G8419 CALC BMI OUT NRM PARAM NOF/U: HCPCS | Performed by: FAMILY MEDICINE

## 2022-08-11 RX ORDER — ETODOLAC 400 MG/1
400 TABLET, FILM COATED ORAL 2 TIMES DAILY
Qty: 60 TABLET | Refills: 3 | Status: SHIPPED | OUTPATIENT
Start: 2022-08-11 | End: 2023-08-11

## 2022-08-11 RX ORDER — METHYLPREDNISOLONE 4 MG/1
TABLET ORAL
Qty: 21 KIT | Refills: 0 | Status: SHIPPED | OUTPATIENT
Start: 2022-08-11

## 2022-08-11 SDOH — HEALTH STABILITY: PHYSICAL HEALTH: ON AVERAGE, HOW MANY DAYS PER WEEK DO YOU ENGAGE IN MODERATE TO STRENUOUS EXERCISE (LIKE A BRISK WALK)?: 5 DAYS

## 2022-08-11 SDOH — HEALTH STABILITY: PHYSICAL HEALTH: ON AVERAGE, HOW MANY MINUTES DO YOU ENGAGE IN EXERCISE AT THIS LEVEL?: 30 MIN

## 2022-08-11 ASSESSMENT — SOCIAL DETERMINANTS OF HEALTH (SDOH)
WITHIN THE LAST YEAR, HAVE TO BEEN RAPED OR FORCED TO HAVE ANY KIND OF SEXUAL ACTIVITY BY YOUR PARTNER OR EX-PARTNER?: NO
WITHIN THE LAST YEAR, HAVE YOU BEEN KICKED, HIT, SLAPPED, OR OTHERWISE PHYSICALLY HURT BY YOUR PARTNER OR EX-PARTNER?: NO
WITHIN THE LAST YEAR, HAVE YOU BEEN AFRAID OF YOUR PARTNER OR EX-PARTNER?: NO
WITHIN THE LAST YEAR, HAVE YOU BEEN HUMILIATED OR EMOTIONALLY ABUSED IN OTHER WAYS BY YOUR PARTNER OR EX-PARTNER?: NO

## 2022-08-11 NOTE — LETTER
[address] [facility] [address]  1966 [address] [employer] [employer] [facility] [address]  Phone: [phone]  Fax: [phone]           Maru Xie MD      August 11, 2022     Patient: Chaim Vick   MR Number: [id number]   YOB: 1995   Date of Visit: 8/11/2022       Dear Dr. Brock Kaur: Thank you for referring Chaim Vick to me for evaluation/treatment. Below are the relevant portions of my assessment and plan of care. If you have questions, please do not hesitate to call me. I look forward to following Rica Nuñez along with you.     Sincerely,        Maru Xie MD    CC providers:  Ace Herzog MD  [address] [place] [address]  [address] [place]

## 2022-08-11 NOTE — PROGRESS NOTES
Chief Complaint    Shoulder Pain (NP RIGHT SHOULDER/)    Initial evaluation ongoing right shoulder pain with difficulty working out    History of Present Illness:  Belen Brown is a 24 y.o. male R handed who is a  who works for [employer] and will be starting next week with remote hx of R shoulder injury from wrestling (xray showed no fx at the time) and fully recovered 3 years ago with therapy and conservative management presenting with R bicep and forearm soreness and R arm/shoulder weakness when working out. The patient is an active male who does resistance training 4-5x per week. The patient was in his usual state of health until about a week ago when he noticed soreness mostly on his R bicep and some R forearm. The next day, he went to gym and was working on bench press (around 155 lbs) when he noticed his R arm stiffening and became noticeably weaker than his L arm. No pain. No recent trauma. No systemic sx. No numbness or tingling. Full ROM and strength equal on R and L. No complaints except when he engages heavy lifting or throwing on his R arm which becomes sore next few days. The patient will be starting a new job as a  in the next few weeks. He recently contacted Dr. Cristela Estrella for an exacerbation of he believes his previous injury which subsequently prompted today's consultation. He is not really complaining of new locking or catching and is being seen today for evaluation with updated shoulder imaging.       Pain Assessment  Location of Pain: Shoulder  Location Modifiers: Right  Severity of Pain: 1  Quality of Pain: Dull, Aching (WOULD BE SHARP/ESCALATE WITH USE OF ARM)  Duration of Pain: Persistent  Frequency of Pain: Constant  Aggravating Factors: Straightening, Stretching, Bending  Limiting Behavior: Yes  Relieving Factors: Rest  Result of Injury: Yes  Work-Related Injury: No  Are there other pain locations you wish to document?: No    Medical History  Patient's medications, allergies, past medical, surgical, social and family histories were reviewed and updated as appropriate. Review of Systems  Relevant review of systems reviewed on 08/11/2022 and available in the patient's chart under the medial tab. Vital Signs  There were no vitals filed for this visit. General Exam:   Constitutional: Patient is adequately groomed with no evidence of malnutrition  DTRs: Deep tendon reflexes are intact  Mental Status: The patient is oriented to time, place and person. The patient's mood and affect are appropriate. Lymphatic: The lymphatic examination bilaterally reveals all areas to be without enlargement or induration. Vascular: Examination reveals no swelling or calf tenderness. Peripheral pulses are palpable and 2+. Neurological: The patient has good coordination. There is no weakness or sensory deficit. Shoulder Examination    Inspection: There is no high-grade deformity or substantial soft tissue swelling. Palpation: Mild tenderness along the bicep tendon. Is mildly tender over the greater tuberosity as well. Rang of Motion: Full range of motion bilaterally. He does have some mild discomfort in the terminal 20 to 30 degrees abduction and forward flexion. Strength: Mild weakness about 4 to 4+ out of 5 with supraspinatus testing as well as infraspinatus testing with pain reproduced at about a 3-4 out of 10. Special Tests: Positive Neer's and Hawking test on R shoulder with pain about a 5 out of 10. . Negative drop and lift off test.  There is no evidence of high-grade instability. Difficulty speeds testing negative today. There is negative screening cervical testing. Skin: There are no rashes, ulcerations or lesions. Distal motor sensory and vascular exam is intact. Gait: Fluid smooth gait. Reflexes:  Symmetrically preserved.        Additional Comments:       Additional Examinations:  Contralateral Exam: Examination of the right shoulder reveals no atrophy or deformity. The skin is warm and dry. Range of motion is within normal limits. There is mild focal tenderness with palpation along bicep tendon. No AC joint tenderness. Positive Neer's and Ware-Isidoro exams on R shoulder. Strength is graded 5/5 throughout. Right Upper Extremity:  Examination of the right upper extremity does not show any tenderness, deformity or injury. Range of motion is unremarkable. There is no gross instability. There are no rashes, ulcerations or lesions. Strength and tone are normal.  Left Upper Extremity: Examination of the left upper extremity does not show any tenderness, deformity or injury. Range of motion is unremarkable. There is no gross instability. There are no rashes, ulcerations or lesions. Strength and tone are normal.          Diagnostic Test Findings:   Right shoulder Xray films were obtained today and does not show evidence of acute osseous injury or arthropathy. Assessment: #1.  1 week that is post symptomatic a revision likely overuse right shoulder rotator cuff tendinitis with biceps tendinitis with clinical impingement and mild shoulder weakness       Impression:    Encounter Diagnoses   Name Primary? Acute pain of right shoulder Yes    Tendinitis of right rotator cuff     Biceps tendinitis of right upper extremity     Shoulder impingement, right        Office Procedures:     Orders Placed This Encounter   Procedures    XR SHOULDER RIGHT (MIN 2 VIEWS)     Standing Status:   Future     Number of Occurrences:   1     Standing Expiration Date:   8/11/2023    Ambulatory referral to Physical Therapy     Referral Priority:   Routine     Referral Type:   Eval and Treat     Referral Reason:   Specialty Services Required     Requested Specialty:   Physical Therapist     Number of Visits Requested:   1       Treatment Plan: Treatment options were discussed with Dominga Pond today. We did review his plain films and exam findings. He did have an episode injuring his shoulder arm wrestling about 3 years ago which he did recover from and states that since roughly 8/4/2022, he has had some discomfort over the biceps and over the greater tuberosity suggestive of cuff tendinitis and overuse. He is going to be starting new job as a  in the next week or 2 at [employer] which will be more physical in nature. We did discuss performing a subacromial injection as well as a biceps tendon sheath injection but he does request to try medications and therapy initially. We did place him on a Medrol Dosepak followed by Lodine 400 mg 1 pill twice daily and we will start him in aggressive physical therapy. Icing and activity modification was discussed and I would like for therapy to evaluate his workout program.  We will see him back in about 4 weeks but was encouraged to contact us in the interim with questions or concerns. This dictation was performed with a verbal recognition program (DRAGON) and it was checked for errors. It is possible that there are still dictated errors within this office note. If so, please bring any errors to my attention for an addendum. All efforts were made to ensure that this office note is accurate.

## 2022-08-11 NOTE — PATIENT INSTRUCTIONS
Take Medrol first for 6 days. This is a steroid pack. Flip the package over to the foil side and the directions will tell you to start with 6 pills the first day, 5 pills the second day, etc. Please do not take any other anti-inflammatories with the medrol dose anabela as this can upset your stomach. If something else is needed, you may take extra strength tylenol.      Once you are finished with the medrol, then you may re-start or start your anti-inflammatory: lodine (etodolac) 2x/day

## 2022-08-15 ENCOUNTER — HOSPITAL ENCOUNTER (OUTPATIENT)
Dept: PHYSICAL THERAPY | Age: 27
Setting detail: THERAPIES SERIES
Discharge: HOME | End: 2022-08-15
Payer: COMMERCIAL

## 2022-08-15 PROCEDURE — 97161 PT EVAL LOW COMPLEX 20 MIN: CPT | Performed by: PHYSICAL THERAPIST

## 2022-08-15 PROCEDURE — 97110 THERAPEUTIC EXERCISES: CPT | Performed by: PHYSICAL THERAPIST

## 2022-08-15 PROCEDURE — 97112 NEUROMUSCULAR REEDUCATION: CPT | Performed by: PHYSICAL THERAPIST

## 2022-08-15 NOTE — PLAN OF CARE
[address], [address] [place], [address]  Phone: [phone]   Fax: [phone]      Bhavik Jackson    Dear Dr. Raisa Baeza,    We had the pleasure of evaluating the following patient for physical therapy services at [address] [place] [address]. A summary of our findings can be found in the initial assessment below. This includes our plan of care. If you have any questions or concerns regarding these findings, please do not hesitate to contact me at the office phone number checked above. Thank you for the referral.       Physician Signature:_______________________________Date:__________________  By signing above (or electronic signature), therapists plan is approved by physician      Patient: Jayne Loera   : 1995   MRN: [id number]  Referring Physician: Dr. Raisa Baeza      Evaluation Date: 8/15/2022      Medical Diagnosis Information:  Diagnosis: Acute pain of R shoulder- M25.511; Tendinitis of R rotator cuff -   M75.81; Biceps tendinitis of R UE - M75.21, Shoulder impingement, right - M75.41   Treatment Diagnosis: decreased ROM, strength, endurance, posture, and high-level iADLs                                         Insurance information: PT Insurance Information: [facility]    C-SSRS Triggered by Intake questionnaire (Past 2 wk assessment):   [x] No, Questionnaire did not trigger screening.   [] Yes, Patient intake triggered further evaluation      [] C-SSRS Screening completed  [] PCP notified via Plan of Care  [] Emergency services notified     Precautions/ Contra-indications: none  Latex Allergy:  [x]NO      []YES  Preferred Language for Healthcare:   [x]English       []other:    SUBJECTIVE: Patient stated complaint: states about 3 years ago he had an injury that caused shooting pain in the biceps. He states last week he was bench pressing and experienced this decreased strength in his RUE.  He went to see an ortho MD about 2.5 years ago after his initial injury. He states the MD gave him some exercises to do, which kind of helped. He states he went to see an ortho MD after his most recent injury and referred him to PT. He also prescribed him a steroid and an anti-inflammatory. CLOF: He states he just had the one episode of weakness in the RUE. He states it depends on what he does at the gym, but he will have occasional symptoms at the gym. Denies pain with sleeping and driving. He states he will have pain with throwing and any rotational motion. Relevant Medical History: Difficulty swallowing (PCP is aware and takes medication)   Functional Disability Index:  FOTO Shoulder  8/15 - 4/100    Pain Scale: 0/10 today, 8/10 at worst  Easing factors: medication  Provocative factors: listed above     Type: []Constant   []Intermittent  []Radiating []Localized []other:     Numbness/Tingling: none    Occupation/School:  - no symptoms at work    Hobbies: Bowling ( Weekly), work out (strength - 4 - 5x/ week)     Living Status/Prior Level of Function: Independent with ADLs and IADLs    OBJECTIVE:     ROM AROM  Comment    L R    Flexion 180 163    Abduction 172 183 + discomfort    ER 94 90    IR 57 52 + pain    Other(cervical)      Other           Strength L R Comment   Flexion 4+/5 4+/5 + pain    Abduction 4+/5 4+/5 + pain     ER 4/5 4/5    IR 4+/5 4/5 + pain                 Lower Trap 4-/5 4-/5    Mid Trap 4/5 4/5    Rhomboids 4+/5 4+/5    Biceps 4+/5 4+/5    Triceps 4+/5 4+/5      Special Tests Results/Comment   Ware-Isidoro Pos    Neers Pos    Speeds Neg   OBriens Pos    Other      Reflexes/Sensation:    [x]Dermatomes/Myotomes intact    [x]Reflexes equal and normal bilaterally   []Other:    Joint mobility:    [x]Normal    []Hypo   []Hyper    Palpation: Biceps tendon    Functional Mobility/Transfers:  Independent     Posture: Poor sitting posture - rounded shoulders     Gait: (include devices/WB status):  WNL [x] Patient history, allergies, meds reviewed. Medical chart reviewed. See intake form. Review Of Systems (ROS):  [x]Performed Review of systems (Integumentary, CardioPulmonary, Neurological) by intake and observation. Intake form has been scanned into medical record. Patient has been instructed to contact their primary care physician regarding ROS issues if not already being addressed at this time. Co-morbidities/Complexities (which will affect course of rehabilitation):   [x]None           Arthritic conditions   []Rheumatoid arthritis (M05.9)  []Osteoarthritis (M19.91)   Cardiovascular conditions   []Hypertension (I10)  []Hyperlipidemia (E78.5)  []Angina pectoris (I20)  []Atherosclerosis (I70)   Musculoskeletal conditions   []Disc pathology   []Congenital spine pathologies   []Prior surgical intervention  []Osteoporosis (M81.8)  []Osteopenia (M85.8)   Endocrine conditions   []Hypothyroid (E03.9)  []Hyperthyroid Gastrointestinal conditions   []Constipation (L82.06)   Metabolic conditions   []Morbid obesity (E66.01)  []Diabetes type 1(E10.65) or 2 (E11.65)   []Neuropathy (G60.9)     Pulmonary conditions   []Asthma (J45)  []Coughing   []COPD (J44.9)   Psychological Disorders  []Anxiety (F41.9)  []Depression (F32.9)   []Other:   []Other:          Barriers to/and or personal factors that will affect rehab potential:              []Age  []Sex              []Motivation/Lack of Motivation                        []Co-Morbidities              []Cognitive Function, education/learning barriers              []Environmental, home barriers              [x]profession/work barriers  []past PT/medical experience  []other:  Justification: Pt works as a , where he requires to work overheard, which may affect rehab potential.     Falls Risk Assessment (30 days):   [x] Falls Risk assessed and no intervention required.   [] Falls Risk assessed and Patient requires intervention due to being higher risk TUG score (>12s at risk):     [] Falls education provided, including           ASSESSMENT:   Functional Impairments   []Noted spinal or UE joint hypomobility   []Noted spinal or UE joint hypermobility   [x]Decreased UE functional ROM   [x]Decreased UE functional strength   []Abnormal reflexes/sensation/myotomal/dermatomal deficits   [x]Decreased RC/scapular/core strength and neuromuscular control   []other:      Functional Activity Limitations (from functional questionnaire and intake)   [x]Reduced ability to tolerate prolonged functional positions   []Reduced ability or difficulty with changes of positions or transfers between positions   [x]Reduced ability to maintain good posture and demonstrate good body mechanics with sitting, bending, and lifting   [] Reduced ability or tolerance with driving and/or computer work   []Reduced ability to sleep   [x]Reduced ability to perform lifting, reaching, carrying tasks   []Reduced ability to tolerate impact through UE   []Reduced ability to reach behind back   []Reduced ability to  or hold objects   [x]Reduced ability to throw or toss an object   []other:      Participation Restrictions   []Reduced participation in self care activities   []Reduced participation in home management activities   []Reduced participation in work activities   []Reduced participation in social activities. [x]Reduced participation in sport / recreational activities. Classification:   []Signs/symptoms consistent with post-surgical status including decreased ROM, strength and function.   []Signs/symptoms consistent with joint sprain/strain   []Signs/symptoms consistent with shoulder impingement   [x]Signs/symptoms consistent with shoulder/elbow/wrist tendinopathy   []Signs/symptoms consistent with Rotator cuff tear   []Signs/symptoms consistent with labral tear   []Signs/symptoms consistent with postural dysfunction    []Signs/symptoms consistent with Glenohumeral IR Deficit - <45 degrees   []Signs/symptoms consistent with facet dysfunction of cervical/thoracic spine    []Signs/symptoms consistent with pathology which may benefit from Dry needling     []other:     Prognosis/Rehab Potential:      [x]Excellent   [x]Good    []Fair   []Poor    Tolerance of evaluation/treatment:    [x]Excellent   [x]Good    []Fair   []Poor    Physical Therapy Evaluation Complexity Justification  [x] A history of present problem with:  [] no personal factors and/or comorbidities that impact the plan of care;  [x]1-2 personal factors and/or comorbidities that impact the plan of care  []3 personal factors and/or comorbidities that impact the plan of care  [x] An examination of body systems using standardized tests and measures addressing any of the following: body structures and functions (impairments), activity limitations, and/or participation restrictions;:  [] a total of 1-2 or more elements   [] a total of 3 or more elements   [x] a total of 4 or more elements   [x] A clinical presentation with:  [x] stable and/or uncomplicated characteristics   [] evolving clinical presentation with changing characteristics  [] unstable and unpredictable characteristics;   [x] Clinical decision making of [x] low, [] moderate, [] high complexity using standardized patient assessment instrument and/or measurable assessment of functional outcome. [x] EVAL (LOW) 82390 (typically 20 minutes face-to-face)  [] EVAL (MOD) 40709 (typically 30 minutes face-to-face)  [] EVAL (HIGH) 69519 (typically 45 minutes face-to-face)  [] RE-EVAL     Reviewed insurance benefits for physical therapy in an outpatient hospital based setting with the patient, including deductible of  and allowable visit number.      PLAN:  Frequency/Duration:  1 days per week for 6 Weeks:  INTERVENTIONS:  [x] Therapeutic exercise including: strength training, ROM, for Upper extremity and core   [x]  NMR activation and proprioception for UE, scap and Core   [x] Manual therapy as indicated for shoulder, scapula and spine to include: Dry Needling/IASTM, STM, PROM, Gr I-IV mobilizations, manipulation. [x] Modalities as needed that may include: thermal agents, E-stim, Biofeedback, US, iontophoresis as indicated  [x] Patient education on joint protection, postural re-education, activity modification, progression of HEP. HEP instruction: (see scanned forms) Access Code [email or web address]:  Patient stated goal: able to work out normally and not be bothered by it   [] Progressing: [] Met: [] Not Met: [] Adjusted    Therapist goals for Patient:   Short Term Goals: To be achieved in: 2 weeks  1. Independent in HEP and progression per patient tolerance, in order to prevent re-injury. [] Progressing: [] Met: [] Not Met: [] Adjusted   2. Patient will have a decrease in pain to facilitate improvement in movement, function, and ADLs as indicated by Functional Deficits. [] Progressing: [] Met: [] Not Met: [] Adjusted    Long Term Goals: To be achieved in: 6 weeks  1.  to assist with reaching prior level of function. [] Progressing: [] Met: [] Not Met: [] Adjusted  2. Patient will demonstrate increased right shoulder flexion AROM to greater than or equal to 180 deg and increased shoulder IR AROM to greater than or equal to 60 deg to allow for proper joint functioning as indicated by patients Functional Deficits. [] Progressing: [] Met: [] Not Met: [] Adjusted  3. Patient will demonstrate an increase in R mid trap and lower trap strength to 4+/5 to allow for proper functional mobility as indicated by patients Functional Deficits. [] Progressing: [] Met: [] Not Met: [] Adjusted  4. Patient will report working out independently pain free.    [] Progressing: [] Met: [] Not Met: [] Adjusted     Electronically signed by:  Nida House, PT, DPT

## 2022-08-15 NOTE — FLOWSHEET NOTE
[facility] [address], [address] [place], [address] [facility] [address] [address]  Phone: [phone]   Fax: [phone]    Physical Therapy Treatment Note/ Progress Report:         Date:  8/15/2022    Patient Name:  Benito Crawford    :  1995  MRN: [id number]  Restrictions/Precautions:    Medical/Treatment Diagnosis Information:  Diagnosis: Acute pain of R shoulder- M25.511; Tendinitis of R rotator cuff -   M75.81; Biceps tendinitis of R UE - M75.21, Shoulder impingement, right - M75.41  Treatment Diagnosis: decreased ROM, strength, endurance, posture, and high-level iADLs  Insurance/Certification information:  PT Insurance Information: [facility]  Physician Information:  Dr. Mariam Champion  Has the plan of care been signed (Y/N):        []  Yes  [x]  No     Date of Patient follow up with Physician:       Is this a Progress Report:     []  Yes  [x]  No        Progress report/ Recertification will be due (10 Rx or 30 days whichever is less): 2022      Visit # Insurance Allowable Auth Required   1 90 []  Yes []  No        Functional Scale:   FOTO Shoulder  8/15 -        Latex Allergy:  [x]NO      []YES  Preferred Language for Healthcare:   [x]English       []other:      Pain level:  0/10 8/15    SUBJECTIVE:  See eval 8/15    OBJECTIVE: See eval 8/15  Observation:   Test measurements:      RESTRICTIONS/PRECAUTIONS: none    Exercises/Interventions:   Exercise/Equipment Resistance/Repetitions Other comments   Stretching/PROM     Pect 90/90 stretch 30 sec x 3 Added 8/15   Sleeper stretch 10 sec x 10  Added /15   Foam roller - thoracic ext  2 min Added 8/15   Table Slides     UE [place]     Pulleys     Wall slide - flex  NV        Isometrics     Retraction          Weight shift     Flexion     Abduction     External Rotation     Internal Rotation     Biceps     Triceps          PRE's     Flexion     Abduction     External Rotation     Internal Rotation     Shrugs     EXT Reverse Flys     Serratus     Horizontal Abd with ER     Biceps     Triceps     Retraction     Prone T 10 x 3  Added 8/15   Prone Y 10 x 3 Added 8/15        Cable Column/Theraband     External Rotation     Internal Rotation     Shrugs     Lats     Ext     Flex     Scapular Retraction     BIC     TRIC     PNF          Dynamic Stability          Plyoback          Manual interventions     IASTM  NV            Therapeutic Exercise and NMR EXR  [x] (84223) Provided verbal/tactile cueing for activities related to strengthening, flexibility, endurance, ROM  for improvements in scapular, scapulothoracic and UE control with self care, reaching, carrying, lifting, house/yardwork, driving/computer work. [x] (64695) Provided verbal/tactile cueing for activities related to improving balance, coordination, kinesthetic sense, posture, motor skill, proprioception  to assist with  scapular, scapulothoracic and UE control with self care, reaching, carrying, lifting, house/yardwork, driving/computer work. Therapeutic Activities:    [x] (73204 or 52328) Provided verbal/tactile cueing for activities related to improving balance, coordination, kinesthetic sense, posture, motor skill, proprioception and motor activation to allow for proper function of scapular, scapulothoracic and UE control with self care, carrying, lifting, driving/computer work.      Home Exercise Program:    [x] (68205) Reviewed/Progressed HEP activities related to strengthening, flexibility, endurance, ROM of scapular, scapulothoracic and UE control with self care, reaching, carrying, lifting, house/yardwork, driving/computer work  [] (72856) Reviewed/Progressed HEP activities related to improving balance, coordination, kinesthetic sense, posture, motor skill, proprioception of scapular, scapulothoracic and UE control with self care, reaching, carrying, lifting, house/yardwork, driving/computer work      Manual Treatments:  PROM / STM / Oscillations-Mobs: G-I, II, III, IV (PA's, Inf., Post.)  [x] (62123) Provided manual therapy to mobilize soft tissue/joints of cervical/CT, scapular GHJ and UE for the purpose of modulating pain, promoting relaxation,  increasing ROM, reducing/eliminating soft tissue swelling/inflammation/restriction, improving soft tissue extensibility and allowing for proper ROM for normal function with self care, reaching, carrying, lifting, house/yardwork, driving/computer work    Modalities:     [] GAME READY (VASO)- for significant edema, swelling, pain control. Charges:  Timed Code Treatment Minutes: 30'    Total Treatment Minutes: 54'       [x] EVAL (LOW) 455 1011 (typically 20 minutes face-to-face)  [] EVAL (MOD) 93494 (typically 30 minutes face-to-face)  [] EVAL (HIGH) 45541 (typically 45 minutes face-to-face)  [] RE-EVAL     [x] ZK(45217) x 1    [] IONTO  [x] NMR (91231) x 1    [] VASO  [] Manual (27059) x     [] Other:  [] TA x      [] Mech Traction (76419)  [] ES(attended) (20069)      [] ES (un) (17003):         ASSESSMENT:  See leena 8/15      GOALS:  Patient stated goal: able to work out normally and not be bothered by it   [] Progressing: [] Met: [] Not Met: [] Adjusted    Therapist goals for Patient:   Short Term Goals: To be achieved in: 2 weeks  1. Independent in HEP and progression per patient tolerance, in order to prevent re-injury. [] Progressing: [] Met: [] Not Met: [] Adjusted   2. Patient will have a decrease in pain to facilitate improvement in movement, function, and ADLs as indicated by Functional Deficits. [] Progressing: [] Met: [] Not Met: [] Adjusted    Long Term Goals: To be achieved in: 6 weeks  1.  to assist with reaching prior level of function. [] Progressing: [] Met: [] Not Met: [] Adjusted  2.  Patient will demonstrate increased right shoulder flexion AROM to greater than or equal to 180 deg and increased shoulder IR AROM to greater than or equal to 60 deg to allow for proper joint functioning as indicated by patients Functional Deficits. [] Progressing: [] Met: [] Not Met: [] Adjusted  3. Patient will demonstrate an increase in R mid trap and lower trap strength to 4+/5 to allow for proper functional mobility as indicated by patients Functional Deficits. [] Progressing: [] Met: [] Not Met: [] Adjusted  4. Patient will report working out independently pain free. [] Progressing: [] Met: [] Not Met: [] Adjusted       Overall Progression Towards Functional goals/ Treatment Progress Update:  [] Patient is progressing as expected towards functional goals listed. [] Progression is slowed due to complexities/Impairments listed. [] Progression has been slowed due to co-morbidities. [x] Plan just implemented, too soon to assess goals progression <30days   [] Goals require adjustment due to lack of progress  [] Patient is not progressing as expected and requires additional follow up with physician  [] Other    Prognosis for POC: [x] Good [] Fair  [] Poor      Patient requires continued skilled intervention: [x] Yes  [] No      Treatment/Activity Tolerance:  [x] Patient tolerated treatment well [] Patient limited by fatique  [] Patient limited by pain  [] Patient limited by other medical complications  [] Other:     Patient education: Patient education on PT and plan of care including diagnosis, prognosis, treatment goals and options. Patient agrees with discussed POC and treatment and is aware of rehab process. 8/15      PLAN: See leena 8/15  [] Continue per plan of care [] Alter current plan (see comments above)  [x] Plan of care initiated [] Hold pending MD visit [] Discharge      Electronically signed by:  Shola Valdez, PT, DPT     Note: If patient does not return for scheduled/ recommended follow up visits, this note will serve as a discharge from care along with most recent update on progress.

## 2022-08-22 ENCOUNTER — HOSPITAL ENCOUNTER (OUTPATIENT)
Dept: PHYSICAL THERAPY | Age: 27
Setting detail: THERAPIES SERIES
Discharge: HOME | End: 2022-08-22
Payer: COMMERCIAL

## 2022-08-22 PROCEDURE — 97530 THERAPEUTIC ACTIVITIES: CPT | Performed by: PHYSICAL THERAPIST

## 2022-08-22 PROCEDURE — 97112 NEUROMUSCULAR REEDUCATION: CPT | Performed by: PHYSICAL THERAPIST

## 2022-08-22 PROCEDURE — 97110 THERAPEUTIC EXERCISES: CPT | Performed by: PHYSICAL THERAPIST

## 2022-08-22 NOTE — FLOWSHEET NOTE
[address] [place], [place] [address], [address] [facility], [address]  Phone: [phone]   Fax: [phone]    Physical Therapy Treatment Note/ Progress Report:         Date:  2022    Patient Name:  Pinky Hall    :  1995  MRN: [id number]  Restrictions/Precautions:    Medical/Treatment Diagnosis Information:  Diagnosis: Acute pain of R shoulder- M25.511; Tendinitis of R rotator cuff -   M75.81; Biceps tendinitis of R UE - M75.21, Shoulder impingement, right - M75.41  Treatment Diagnosis: decreased ROM, strength, endurance, posture, and high-level iADLs  Insurance/Certification information:  PT Insurance Information: [facility]  Physician Information:  Dr. Colleen Retana  Has the plan of care been signed (Y/N):        []  Yes  [x]  No     Date of Patient follow up with Physician:       Is this a Progress Report:     []  Yes  [x]  No        Progress report/ Recertification will be due (10 Rx or 30 days whichever is less): 2022      Visit # Insurance Allowable Auth Required   2 90 []  Yes []  No        Functional Scale:   FOTO Shoulder  8/15 -        Latex Allergy:  [x]NO      []YES  Preferred Language for Healthcare:   [x]English       []other:      Pain level:  0/10     SUBJECTIVE:  States HEP seems alright, doing them daily. He states he feels about the same. He states he is doing fine.  He states he had pain with pull downs as he increased his weights.       OBJECTIVE: See leena 8/15  Observation:   Test measurements:      RESTRICTIONS/PRECAUTIONS: none    Exercises/Interventions:   Exercise/Equipment Resistance/Repetitions Other comments   Stretching/PROM     Pect 90/90 stretch 30 sec x 3 Added 8/15   Sleeper stretch 10 sec x 10  Added 8/15   Foam roller - thoracic ext  2 min with OP  Added 8/15   Table Slides     UE [place]     Pulleys     Wall slide - flex 10 sec x 10  Added         Isometrics     Retraction          Weight shift Flexion     Abduction     External Rotation     Internal Rotation     Biceps     Triceps          PRE's     Flexion     Abduction     External Rotation     Internal Rotation     Shrugs     EXT     Reverse Flys     Serratus     Horizontal Abd with ER     Biceps     Triceps     Retraction     Prone T 10 x 3 3# ^8/22   Prone Y 10 x 3 3# ^8/22        Cable Column/Theraband     External Rotation     Internal Rotation     Shrugs     Lats     Ext     Flex     Scapular Retraction     BIC     TRIC     PNF 10 x 3 1# Added 8/22        Dynamic Stability     Ball on the wall 10 x 3 each, 4-way, 2lb ball Added 8/22   Body blade 10 sec x 10 90 deg flex  90/90 IR/ ER  Added 8/22             Plyoback          Manual interventions     STM to pect major and minor 5'  Added 8/22            Therapeutic Exercise and NMR EXR  [x] (72207) Provided verbal/tactile cueing for activities related to strengthening, flexibility, endurance, ROM  for improvements in scapular, scapulothoracic and UE control with self care, reaching, carrying, lifting, house/yardwork, driving/computer work. [x] (38986) Provided verbal/tactile cueing for activities related to improving balance, coordination, kinesthetic sense, posture, motor skill, proprioception  to assist with  scapular, scapulothoracic and UE control with self care, reaching, carrying, lifting, house/yardwork, driving/computer work. Therapeutic Activities:    [x] (80915 or 04404) Provided verbal/tactile cueing for activities related to improving balance, coordination, kinesthetic sense, posture, motor skill, proprioception and motor activation to allow for proper function of scapular, scapulothoracic and UE control with self care, carrying, lifting, driving/computer work.      Home Exercise Program:    [x] (18604) Reviewed/Progressed HEP activities related to strengthening, flexibility, endurance, ROM of scapular, scapulothoracic and UE control with self care, reaching, carrying, lifting, house/yardwork, driving/computer work  [] (73352) Reviewed/Progressed HEP activities related to improving balance, coordination, kinesthetic sense, posture, motor skill, proprioception of scapular, scapulothoracic and UE control with self care, reaching, carrying, lifting, house/yardwork, driving/computer work      Access Code [facility]  Updated 8/22    Manual Treatments:  PROM / STM / Oscillations-Mobs:  G-I, II, III, IV (PA's, Inf., Post.)  [x] (99026) Provided manual therapy to mobilize soft tissue/joints of cervical/CT, scapular GHJ and UE for the purpose of modulating pain, promoting relaxation,  increasing ROM, reducing/eliminating soft tissue swelling/inflammation/restriction, improving soft tissue extensibility and allowing for proper ROM for normal function with self care, reaching, carrying, lifting, house/yardwork, driving/computer work    Modalities:     [] GAME READY (VASO)- for significant edema, swelling, pain control. Charges:  Timed Code Treatment Minutes: 48'    Total Treatment Minutes: 54'       [] EVAL (LOW) 455 1011 (typically 20 minutes face-to-face)  [] EVAL (MOD) 70109 (typically 30 minutes face-to-face)  [] EVAL (HIGH) 05169 (typically 45 minutes face-to-face)  [] RE-EVAL     [x] KN(18690) x 1    [] IONTO  [x] NMR (17093) x 1    [] VASO  [] Manual (83011) x     [] Other:  [x] TA x  1    [] Mech Traction (17687)  [] ES(attended) (73679)      [] ES (un) (38684):         ASSESSMENT:        GOALS:  Patient stated goal: able to work out normally and not be bothered by it   [] Progressing: [] Met: [] Not Met: [] Adjusted    Therapist goals for Patient:   Short Term Goals: To be achieved in: 2 weeks  1. Independent in HEP and progression per patient tolerance, in order to prevent re-injury. [] Progressing: [] Met: [] Not Met: [] Adjusted   2. Patient will have a decrease in pain to facilitate improvement in movement, function, and ADLs as indicated by Functional Deficits.     [] Progressing: [] follow-up in 2 weeks. 8/22    Patient education: Patient education on PT and plan of care including diagnosis, prognosis, treatment goals and options. Patient agrees with discussed POC and treatment and is aware of rehab process. 8/15      PLAN: See eval 8/15  [] Continue per plan of care [] Alter current plan (see comments above)  [x] Plan of care initiated [] Hold pending MD visit [] Discharge      Electronically signed by:  Marti Muro, PT, DPT     Note: If patient does not return for scheduled/ recommended follow up visits, this note will serve as a discharge from care along with most recent update on progress.

## 2022-08-23 DIAGNOSIS — R05.9 COUGH: ICD-10-CM

## 2022-08-23 RX ORDER — MONTELUKAST SODIUM 10 MG/1
10 TABLET ORAL DAILY
Qty: 30 TABLET | Refills: 0 | Status: SHIPPED | OUTPATIENT
Start: 2022-08-23

## 2022-08-23 NOTE — TELEPHONE ENCOUNTER
Medication:   Requested Prescriptions     Pending Prescriptions Disp Refills    montelukast (SINGULAIR) 10 MG tablet [Pharmacy Med Name: MONTELUKAST 10MG TABLETS] 30 tablet 3     Sig: TAKE 1 TABLET BY MOUTH DAILY       Last Filled:      Patient Phone Number: [phone] (home)     Last appt: 7/21/2022   Next appt: Visit date not found  Return in about 1 month (around 6/24/2022).   Last PSA: No results found for: PSA

## 2022-09-06 ENCOUNTER — HOSPITAL ENCOUNTER (OUTPATIENT)
Dept: PHYSICAL THERAPY | Age: 27
Setting detail: THERAPIES SERIES
Discharge: HOME | End: 2022-09-06

## 2022-09-06 NOTE — FLOWSHEET NOTE
[address] [facility]       Physical Therapy   Phone: [phone]   Fax: [phone]      Physical Therapy  Cancellation/No-show Note  Patient Name:  Karrie Doherty  :  1995   Date:  2022  Cancelled visits to date: 1  No-shows to date: 0    For today's appointment patient:  [x]  Cancelled  []  Rescheduled appointment  []  No-show     Reason given by patient:  []  Patient ill  []  Conflicting appointment  []  No transportation    [x]  Conflict with work  []  No reason given  []  Other:     Comments:  will be at work until [employer] signed by:  Casimiro Martin, PT, DPT

## 2022-09-19 ENCOUNTER — HOSPITAL ENCOUNTER (OUTPATIENT)
Dept: PHYSICAL THERAPY | Age: 27
Setting detail: THERAPIES SERIES
Discharge: HOME | End: 2022-09-19

## 2022-09-19 NOTE — FLOWSHEET NOTE
[address] [facility]       Physical Therapy   Phone: [phone]   Fax: [phone]      Physical Therapy  Cancellation/No-show Note  Patient Name:  Ced Suárez  :  1995   Date:  2022  Cancelled visits to date: 2  No-shows to date: 0    For today's appointment patient:  [x]  Cancelled  []  Rescheduled appointment  []  No-show     Reason given by patient:  []  Patient ill  []  Conflicting appointment  []  No transportation    [x]  Conflict with work  []  No reason given  []  Other:     Comments:  will be at work until [employer] signed by:  Honor Epley, PT, DPT

## 2022-11-28 ENCOUNTER — PATIENT MESSAGE (OUTPATIENT)
Dept: PRIMARY CARE CLINIC | Age: 27
End: 2022-11-28

## 2022-11-28 DIAGNOSIS — R05.9 COUGH: ICD-10-CM

## 2022-11-28 RX ORDER — MONTELUKAST SODIUM 10 MG/1
10 TABLET ORAL DAILY
Qty: 30 TABLET | Refills: 0 | Status: SHIPPED | OUTPATIENT
Start: 2022-11-28

## 2022-11-28 NOTE — TELEPHONE ENCOUNTER
From: Olinda Matson  To: Dr. Pranav Marcano: 11/28/2022 11:42 AM EST  Subject: Allergies    Hello, Darius Artist been having trouble with drainage and runny nose during the day and at night when sleeping. I take 24hr Zyrtec, [facility] brand 12r D, and 2 nose sprays. Is there anything I can substitute for or add that will help me throughout the day? Especially when Im sleeping? Thank you!

## 2022-11-30 ENCOUNTER — PATIENT MESSAGE (OUTPATIENT)
Dept: PRIMARY CARE CLINIC | Age: 27
End: 2022-11-30

## 2022-11-30 NOTE — TELEPHONE ENCOUNTER
From: Curtis Chung  To: Dr. Davis Newer: 11/30/2022 9:40 AM EST  Subject: Trouble sleeping    Hows it going, my throat has been acting up while Im trying to sleep. All signs point to acid reflux/GERD. We have had this conversation before and you gave me medication to help with it as well as information to get with a specialist. Should I get with a specialist first? Or I have a VV appointment tomorrow at 1, should I do that first? What is the specialist you recommend?  Thank you

## 2022-12-01 ENCOUNTER — TELEMEDICINE (OUTPATIENT)
Dept: PRIMARY CARE CLINIC | Age: 27
End: 2022-12-01
Payer: COMMERCIAL

## 2022-12-01 ENCOUNTER — PATIENT MESSAGE (OUTPATIENT)
Dept: PRIMARY CARE CLINIC | Age: 27
End: 2022-12-01

## 2022-12-01 DIAGNOSIS — R13.10 DYSPHAGIA, UNSPECIFIED TYPE: ICD-10-CM

## 2022-12-01 DIAGNOSIS — K21.9 GASTROESOPHAGEAL REFLUX DISEASE WITHOUT ESOPHAGITIS: Primary | ICD-10-CM

## 2022-12-01 PROCEDURE — 1036F TOBACCO NON-USER: CPT | Performed by: STUDENT IN AN ORGANIZED HEALTH CARE EDUCATION/TRAINING PROGRAM

## 2022-12-01 PROCEDURE — G8419 CALC BMI OUT NRM PARAM NOF/U: HCPCS | Performed by: STUDENT IN AN ORGANIZED HEALTH CARE EDUCATION/TRAINING PROGRAM

## 2022-12-01 PROCEDURE — G8484 FLU IMMUNIZE NO ADMIN: HCPCS | Performed by: STUDENT IN AN ORGANIZED HEALTH CARE EDUCATION/TRAINING PROGRAM

## 2022-12-01 PROCEDURE — G8427 DOCREV CUR MEDS BY ELIG CLIN: HCPCS | Performed by: STUDENT IN AN ORGANIZED HEALTH CARE EDUCATION/TRAINING PROGRAM

## 2022-12-01 PROCEDURE — 99213 OFFICE O/P EST LOW 20 MIN: CPT | Performed by: STUDENT IN AN ORGANIZED HEALTH CARE EDUCATION/TRAINING PROGRAM

## 2022-12-01 RX ORDER — LIDOCAINE HYDROCHLORIDE 20 MG/ML
15 SOLUTION OROPHARYNGEAL PRN
Qty: 100 ML | Refills: 0 | Status: SHIPPED | OUTPATIENT
Start: 2022-12-01

## 2022-12-01 RX ORDER — PANTOPRAZOLE SODIUM 40 MG/1
40 TABLET, DELAYED RELEASE ORAL DAILY
Qty: 30 TABLET | Refills: 11 | Status: SHIPPED | OUTPATIENT
Start: 2022-12-01 | End: 2022-12-02 | Stop reason: SDUPTHER

## 2022-12-01 ASSESSMENT — ENCOUNTER SYMPTOMS
CONSTIPATION: 0
BLOOD IN STOOL: 0
ABDOMINAL DISTENTION: 0
ABDOMINAL PAIN: 0
NAUSEA: 0
RECTAL PAIN: 0
TROUBLE SWALLOWING: 1

## 2022-12-01 NOTE — PROGRESS NOTES
2022       TELEHEALTH EVALUATION -- Audio/Visual (During VYDNG-29 public health emergency)    HPI:    Kymberly Alvarado (:  1995) has requested an audio/video evaluation for the following concern(s):    GERD  Suzanne complains of heartburn. This has been associated with abdominal bloating, choking on food, deep pressure at base of neck, fullness after meals, heartburn, and hoarseness. He denies hematemesis, melena, and midespigastric pain. Symptoms have been present for several months. He has had dysphagia for solids, which tends to worsen depending on how severe his reflux is the time. He has not lost weight. He denies melena, hematochezia, hematemesis, and coffee ground emesis. Medical therapy in the past has included proton pump inhibitors, which has not been particularly effective. Review of Systems   Constitutional:  Negative for unexpected weight change. HENT:  Positive for trouble swallowing. Gastrointestinal:  Negative for abdominal distention, abdominal pain, blood in stool, constipation, nausea and rectal pain. Prior to Visit Medications    Medication Sig Taking? Authorizing Provider   pantoprazole (PROTONIX) 40 MG tablet Take 1 tablet by mouth daily Yes Navdeep Costa, DO   lidocaine viscous hcl (XYLOCAINE) 2 % SOLN solution Take 15 mLs by mouth as needed for Irritation Yes Navdeep Costa, DO   montelukast (SINGULAIR) 10 MG tablet Take 1 tablet by mouth daily Yes Brielle Sylvester MD   methylPREDNISolone (MEDROL DOSEPACK) 4 MG tablet Take by mouth as directed. Yes Jeremie Crenshaw MD   etodolac (LODINE) 400 MG tablet Take 1 tablet by mouth in the morning and 1 tablet before bedtime.  Yes Jeremie Crenshaw MD   azelastine (ASTELIN) 0.1 % nasal spray 2 sprays by Nasal route 2 times daily Use in each nostril as directed Yes Brielle Sylvester MD   cetirizine (ZYRTEC) 10 MG tablet Take 10 mg by mouth daily  Yes Historical Provider, MD       Social History     Tobacco Use    Smoking status: Never Smokeless tobacco: Never    Tobacco comments:     counseled on tobacco exposure avoidance   Vaping Use    Vaping Use: Never used   Substance Use Topics    Alcohol use: Yes     Alcohol/week: 0.0 standard drinks     Types: 5 Cans of beer, 3 Shots of liquor per week     Comment: occasaionlly     Drug use: No        Past Medical History:   Diagnosis Date    Allergic rhinitis, seasonal     Attention deficit hyperactivity disorder (ADHD), predominantly inattentive type 8/31/2018    Conjunctivitis, allergic        PHYSICAL EXAMINATION:  There were no vitals taken for this visit. Wt Readings from Last 3 Encounters:   08/11/22 175 lb (79.4 kg)   07/21/22 178 lb (80.7 kg)   07/19/22 177 lb 11.2 oz (80.6 kg)       [ INSTRUCTIONS:  \"[x]\" Indicates a positive item  \"[]\" Indicates a negative item  -- DELETE ALL ITEMS NOT EXAMINED]  [x] Alert  [x] Oriented to person/place/time    [x] No apparent distress  []  Appears Unwell:     [x] Breathing appears normal  [] Appears tachypneic      [] Rash on visible skin:    [x] Cranial Nerves II-XII grossly intact    [] Motor grossly intact in visible upper extremities    [] Motor grossly intact in visible lower extremities    [x] Normal Mood  [] Anxious appearing    [] Depressed appearing     [] OTHER:      Due to this being a TeleHealth encounter, evaluation of the following organ systems is limited: Vitals/Constitutional/EENT/Resp/CV/GI//MS/Neuro/Skin/Heme-Lymph-Imm.   Lab Results   Component Value Date     09/17/2021    K 4.1 09/17/2021     09/17/2021    CO2 24 09/17/2021    BUN 19 09/17/2021    CREATININE 1.1 09/17/2021    GLUCOSE 73 09/17/2021    CALCIUM 10.0 09/17/2021    PROT 7.1 09/17/2021    LABALBU 4.8 09/17/2021    BILITOT 1.3 (H) 09/17/2021    ALKPHOS 59 09/17/2021    AST 33 09/17/2021    ALT 36 09/17/2021    LABGLOM >60 09/17/2021    GFRAA >60 09/17/2021    AGRATIO 2.1 09/17/2021    GLOB 2.3 09/17/2021     No results found for: LABA1C  No results found for: EAG      ASSESSMENT/PLAN:  1. Gastroesophageal reflux disease without esophagitis: Acid reflux, which has been refractory to medical management and lifestyle modifications. He is having dysphagia and laryngitis. Has an appointment with gastroenterology scheduled in early January. Will increase his PPI to twice daily to help resolve his symptoms until he sees gastroenterology. He can follow-up with Dr. Jere Cranker after that appointment. Return if symptoms worsen or fail to improve. Beatrice Otero, was evaluated through a synchronous (real-time) audio-video encounter. The patient (or guardian if applicable) is aware that this is a billable service, which includes applicable co-pays. This Virtual Visit was conducted with patient's (and/or legal guardian's) consent. The visit was conducted pursuant to the emergency declaration under the [address] [place] [id number], [address] [place] [address] authority and the [employer] Act. Patient identification was verified, and a caregiver was present when appropriate. The patient was located at Home: [address] [place] [address]. [place] [id number]. Provider was located at [facility] (Appt Dept): [address] [place] [address]. Total time spent for this encounter: Not billed by time    --Ross Pekc DO on 12/1/2022 at 2:41 PM    An electronic signature was used to authenticate this note. An  electronic signature was used to authenticate this note. --Ross Peck DO on 12/1/2022 at 2:41 PM        Pursuant to the emergency declaration under the [address] [place] [address] authority and the [employer] Act, this Virtual  Visit was conducted, with patient's consent, to reduce the patient's risk of exposure to COVID-19 and provide continuity of care for an established patient.     Services were provided through a video synchronous discussion virtually to substitute for in-person clinic visit.

## 2022-12-02 RX ORDER — PANTOPRAZOLE SODIUM 40 MG/1
40 TABLET, DELAYED RELEASE ORAL 2 TIMES DAILY
Qty: 60 TABLET | Refills: 11 | Status: SHIPPED | OUTPATIENT
Start: 2022-12-02 | End: 2023-12-02

## 2022-12-02 NOTE — TELEPHONE ENCOUNTER
From: Peewee Paul  To: Dr. Mirta Marmolejo: 12/1/2022 7:32 PM EST  Subject: Gretchen Quiet    What is the best way to take the medication you said I can double up on? 1 in the morning and one at night? Or both at same time? Also, Im gonna run out of that medicine if I double up so can you prescribe me more?

## 2022-12-14 ENCOUNTER — ANESTHESIA EVENT (OUTPATIENT)
Dept: ENDOSCOPY | Age: 27
End: 2022-12-14
Payer: COMMERCIAL

## 2022-12-14 NOTE — PROGRESS NOTES
C-diff Questionnaire:     * Admitted with diarrhea? [] YES    [x]  NO     *Prior history of C-Diff. In last 3 months? [] YES    [x]  NO     *Antibiotic use in the past 6-8 weeks? [x]  NO    []  YES      If yes, which: REASON_________________     *Prior hospitalization or nursing home in the last month? []  YES    [x]  NO     SAFETY FIRST. .call before you fall    [address] time___6     Surgery time_730    Do not eat or drink anything after 12:00 midnight prior to your surgery. This includes water chewing gum, mints and ice chips- the Day of Surgery. You may brush your teeth and gargle the morning of your surgery, but do not swallow the water     Please see your family doctor/pediatrician for a history and physical and/or questions concerning medications. Bring any test results/reports from your physicians office. If you are under the care of a heart doctor or specialist doctor, please be aware that you may be asked to them for clearance    You may be asked to stop blood thinners such as Coumadin, Plavix, Fragmin, Lovenox, etc., or any anti-inflammatories such as:  Aspirin, Ibuprofen, Advil, Naproxen prior to your surgery. We also ask that you stop any OTC medications such as fish oil, vitamin E, glucosamine, garlic, Multivitamins, COQ 10, etc.    We ask that you do not smoke 24 hours prior to surgery  We ask that you do not  drink any alcoholic beverages 24 hours prior to surgery     You must make arrangements for a responsible adult to take you home after your surgery. For your safety you will not be allowed to leave alone or drive yourself home. Your surgery will be cancelled if you do not have a ride home. Also for your safety, it is strongly suggested that someone stay with you the first 24 hours after your surgery.      A parent or legal guardian must accompany a child scheduled for surgery and plan to stay at the hospital until the child is discharged. Please do not bring other children with you. For your comfort, please wear simple loose fitting clothing to the hospital.  Please do not bring valuables. Do not wear any make-up or nail polish on your fingers or toes. For your safety, please do not wear any jewelry or body piercing's on the day of surgery. All jewelry must be removed. If you have dentures, they will be removed before going to operating room. For your convenience, we will provide you with a container. If you wear contact lenses or glasses, they will be removed, please bring a case for them. If you have a living will and a durable power of  for healthcare, please bring in a copy. As part of our patient safety program to minimize surgical site infections, we ask you to do the following:    Please notify your surgeon if you develop any illness between         now and the day of your surgery. This includes a cough, cold, fever, sore throat, nausea,         or vomiting, and diarrhea, etc.   Please notify your surgeon if you experience dizziness, shortness         of breath or blurred vision between now and the time of your surgery. Do not shave your operative site 96 hours prior to surgery. For face and neck surgery, men may use an electric razor 48 hours   prior to surgery. You may shower the night before surgery or the morning of   your surgery with an antibacterial soap. You will need to bring a photo ID and insurance card     If you use a C-pap or Bi-pap machine, please bring your machine with you to the hospital     Our goal is to provide you with excellent care, therefore, visitors will be limited to so that we may focus on providing this care for you. Please contact your surgeon office, if you have any further questions.                  [facility] phone number:  [phone] PAT fax number:  [phone]    Please note these are generalized instructions for all surgical cases, you may be provided with more specific instructions according to your surgery.

## 2022-12-15 ENCOUNTER — ANESTHESIA (OUTPATIENT)
Dept: ENDOSCOPY | Age: 27
End: 2022-12-15
Payer: COMMERCIAL

## 2022-12-15 ENCOUNTER — HOSPITAL ENCOUNTER (OUTPATIENT)
Age: 27
Setting detail: OUTPATIENT SURGERY
Discharge: HOME | End: 2022-12-15
Attending: INTERNAL MEDICINE | Admitting: INTERNAL MEDICINE
Payer: COMMERCIAL

## 2022-12-15 VITALS
WEIGHT: 178 LBS | DIASTOLIC BLOOD PRESSURE: 73 MMHG | HEART RATE: 69 BPM | OXYGEN SATURATION: 100 % | HEIGHT: 69 IN | TEMPERATURE: 97.4 F | BODY MASS INDEX: 26.36 KG/M2 | SYSTOLIC BLOOD PRESSURE: 109 MMHG | RESPIRATION RATE: 18 BRPM

## 2022-12-15 DIAGNOSIS — R07.0 THROAT PAIN: ICD-10-CM

## 2022-12-15 DIAGNOSIS — K21.9 GASTROESOPHAGEAL REFLUX DISEASE, UNSPECIFIED WHETHER ESOPHAGITIS PRESENT: ICD-10-CM

## 2022-12-15 PROCEDURE — 2709999900 HC NON-CHARGEABLE SUPPLY: Performed by: INTERNAL MEDICINE

## 2022-12-15 PROCEDURE — 3700000001 HC ADD 15 MINUTES (ANESTHESIA): Performed by: INTERNAL MEDICINE

## 2022-12-15 PROCEDURE — 7100000011 HC PHASE II RECOVERY - ADDTL 15 MIN: Performed by: INTERNAL MEDICINE

## 2022-12-15 PROCEDURE — 3609012400 HC EGD TRANSORAL BIOPSY SINGLE/MULTIPLE: Performed by: INTERNAL MEDICINE

## 2022-12-15 PROCEDURE — 6360000002 HC RX W HCPCS: Performed by: NURSE ANESTHETIST, CERTIFIED REGISTERED

## 2022-12-15 PROCEDURE — 3700000000 HC ANESTHESIA ATTENDED CARE: Performed by: INTERNAL MEDICINE

## 2022-12-15 PROCEDURE — 2500000003 HC RX 250 WO HCPCS: Performed by: NURSE ANESTHETIST, CERTIFIED REGISTERED

## 2022-12-15 PROCEDURE — 7100000000 HC PACU RECOVERY - FIRST 15 MIN: Performed by: INTERNAL MEDICINE

## 2022-12-15 PROCEDURE — 7100000001 HC PACU RECOVERY - ADDTL 15 MIN: Performed by: INTERNAL MEDICINE

## 2022-12-15 PROCEDURE — 88305 TISSUE EXAM BY PATHOLOGIST: CPT

## 2022-12-15 PROCEDURE — 7100000010 HC PHASE II RECOVERY - FIRST 15 MIN: Performed by: INTERNAL MEDICINE

## 2022-12-15 PROCEDURE — 2580000003 HC RX 258: Performed by: NURSE ANESTHETIST, CERTIFIED REGISTERED

## 2022-12-15 RX ORDER — ONDANSETRON 2 MG/ML
4 INJECTION INTRAMUSCULAR; INTRAVENOUS
Status: DISCONTINUED | OUTPATIENT
Start: 2022-12-15 | End: 2022-12-15 | Stop reason: HOSPADM

## 2022-12-15 RX ORDER — SODIUM CHLORIDE 9 MG/ML
INJECTION, SOLUTION INTRAVENOUS PRN
Status: DISCONTINUED | OUTPATIENT
Start: 2022-12-15 | End: 2022-12-15 | Stop reason: HOSPADM

## 2022-12-15 RX ORDER — SODIUM CHLORIDE 0.9 % (FLUSH) 0.9 %
5-40 SYRINGE (ML) INJECTION EVERY 12 HOURS SCHEDULED
Status: DISCONTINUED | OUTPATIENT
Start: 2022-12-15 | End: 2022-12-15 | Stop reason: HOSPADM

## 2022-12-15 RX ORDER — SODIUM CHLORIDE 0.9 % (FLUSH) 0.9 %
5-40 SYRINGE (ML) INJECTION PRN
Status: DISCONTINUED | OUTPATIENT
Start: 2022-12-15 | End: 2022-12-15 | Stop reason: HOSPADM

## 2022-12-15 RX ORDER — SODIUM CHLORIDE 9 MG/ML
INJECTION, SOLUTION INTRAVENOUS CONTINUOUS PRN
Status: DISCONTINUED | OUTPATIENT
Start: 2022-12-15 | End: 2022-12-15 | Stop reason: SDUPTHER

## 2022-12-15 RX ORDER — PROPOFOL 10 MG/ML
INJECTION, EMULSION INTRAVENOUS PRN
Status: DISCONTINUED | OUTPATIENT
Start: 2022-12-15 | End: 2022-12-15 | Stop reason: SDUPTHER

## 2022-12-15 RX ORDER — GLYCOPYRROLATE 0.2 MG/ML
INJECTION INTRAMUSCULAR; INTRAVENOUS PRN
Status: DISCONTINUED | OUTPATIENT
Start: 2022-12-15 | End: 2022-12-15 | Stop reason: SDUPTHER

## 2022-12-15 RX ORDER — LIDOCAINE HYDROCHLORIDE 20 MG/ML
INJECTION, SOLUTION EPIDURAL; INFILTRATION; INTRACAUDAL; PERINEURAL PRN
Status: DISCONTINUED | OUTPATIENT
Start: 2022-12-15 | End: 2022-12-15 | Stop reason: SDUPTHER

## 2022-12-15 RX ADMIN — LIDOCAINE HYDROCHLORIDE 60 MG: 20 INJECTION, SOLUTION EPIDURAL; INFILTRATION; INTRACAUDAL; PERINEURAL at 07:35

## 2022-12-15 RX ADMIN — PROPOFOL 100 MG: 10 INJECTION, EMULSION INTRAVENOUS at 07:35

## 2022-12-15 RX ADMIN — PROPOFOL 50 MG: 10 INJECTION, EMULSION INTRAVENOUS at 07:41

## 2022-12-15 RX ADMIN — SODIUM CHLORIDE: 9 INJECTION, SOLUTION INTRAVENOUS at 07:27

## 2022-12-15 RX ADMIN — GLYCOPYRROLATE 0.1 MG: 0.2 INJECTION, SOLUTION INTRAMUSCULAR; INTRAVENOUS at 07:35

## 2022-12-15 RX ADMIN — PROPOFOL 100 MG: 10 INJECTION, EMULSION INTRAVENOUS at 07:38

## 2022-12-15 RX ADMIN — PROPOFOL 50 MG: 10 INJECTION, EMULSION INTRAVENOUS at 07:44

## 2022-12-15 ASSESSMENT — PAIN - FUNCTIONAL ASSESSMENT: PAIN_FUNCTIONAL_ASSESSMENT: 0-10

## 2022-12-15 ASSESSMENT — ENCOUNTER SYMPTOMS: SHORTNESS OF BREATH: 0

## 2022-12-15 ASSESSMENT — PAIN SCALES - GENERAL: PAINLEVEL_OUTOF10: 0

## 2022-12-15 NOTE — OP NOTE
Endoscopy Note    Patient: Howard Tolentino  : 1995  Acct#:     Procedure: Esophagogastroduodenoscopy with biopsy                         Date:  12/15/2022     Surgeon:   Alejandro Sam MD    Referring Physician:  Anna Ahumada, MD    Indications: This is a 24y.o. year old male who presents today with Dysphagia/GERD     Postoperative Diagnosis:  1. Normal EGD-Biopsied for EOE. Anesthesia:  The patient was administered IV propofol per anesthesiology team.  Please see their operative records for full details. Consent:  The patient or their legal guardian has signed an informed consent, and is aware of the potential risks, benefits, alternatives, and potential complications of this procedure. These include, but are not limited to hemorrhage, bleeding, post procedural pain, perforation, phlebitis, aspiration, hypotension, hypoxia, cardiovascular events such as arryhthmia, and possibly death. Description of Procedure: The patient was then taken to the endoscopy suite, placed in the left lateral decubitus position and the above IV sedation was administrered. The Olympus video endoscope was placed through the patient's oropharynx without difficulty to the extent of the 2nd portion of the duodenum. Both forward and retroflexed views of the stomach were obtained. Findings:    Esophagus: The esophagus appeared normal without evidence of Cramer's esophagus or reflux esophagitis. Mid esophageal biopsies obtained for EOE. Stomach: The stomach appeared normal on forward and retroflexed views.     Duodenum: The first and 2nd portions of the duodenum appeared normal with normal villous pattern    Estimated Blood Loss (mL): < 5 CC     Complications: None    Specimens:   ID Type Source Tests Collected by Time Destination   A : mid esophageal bx Tissue Esophagus SURGICAL PATHOLOGY Alejandro Sam MD 12/15/2022 2890        The scope was then withdrawn back into the stomach, it was decompressed, and the scope was completely withdrawn. The patient tolerated the procedure well and was taken to the post anesthesia care unit in good condition. Impression:   1) See post procedure diagnoses    Recommendations:   1) Await pathology results   2) Recommend continuing on Pantoprazole as ordered. If biopsies positive for EOE will discuss additional treatment options. If normal we will obtain an ENT evaluation to see if any post-nasal drip is contributing to symptoms. 3) The patient had biopsies taken today. The patient should call for results in 7 days if they have not heard from our office. Our number is [phone].        Benita Juarez MD  [address] and [address] [facility]

## 2022-12-15 NOTE — ANESTHESIA POSTPROCEDURE EVALUATION
Department of Anesthesiology  Postprocedure Note    Patient: Claire Garcia  MRN: [id number]  YOB: 1995  Date of evaluation: 12/15/2022      Procedure Summary     Date: 12/15/22 Room / Location: [address]    Anesthesia Start: 8659 Anesthesia Stop: 1424    Procedure: EGD BIOPSY Diagnosis:       Gastroesophageal reflux disease, unspecified whether esophagitis present      Throat pain      (GERD (GASTROESOPHAGEAL REFLUX DISEASE, THROAT DISCOMFORT)    Surgeons: Hannah Garcia MD Responsible Provider: Kevin Gonzalez MD    Anesthesia Type: MAC ASA Status: 2          Anesthesia Type: MAC    Mata Phase I: Mata Score: 10    Mata Phase II: Mata Score: 10      Anesthesia Post Evaluation    Patient location during evaluation: PACU  Patient participation: complete - patient participated  Level of consciousness: awake and alert  Airway patency: patent  Nausea & Vomiting: no nausea and no vomiting  Complications: no  Cardiovascular status: hemodynamically stable  Respiratory status: acceptable  Hydration status: stable

## 2022-12-15 NOTE — H&P
Pre-operative History and Physical    Patient: Kymberly Alvarado  : 1995  Acct#:     Intended Procedure:  EGD    HISTORY OF PRESENT ILLNESS:  The patient is a 24 y.o. male  who presents for/due to Dysphagia/GERD     Past Medical History:        Diagnosis Date    Allergic rhinitis, seasonal     Attention deficit hyperactivity disorder (ADHD), predominantly inattentive type 2018    Conjunctivitis, allergic     COVID-19     GERD (gastroesophageal reflux disease)      Past Surgical History:        Procedure Laterality Date    NASAL FRACTURE SURGERY  2021    with septoplasty     Medications Prior to Admission:   Prior to Admission medications    Medication Sig Start Date End Date Taking? Authorizing Provider   Loratadine-Pseudoephedrine (CLARITIN-D 12 HOUR PO) Take by mouth Daily   Yes Historical Provider, MD   pantoprazole (PROTONIX) 40 MG tablet Take 1 tablet by mouth in the morning and at bedtime 22  Brielle Sylvester MD   lidocaine viscous hcl (XYLOCAINE) 2 % SOLN solution Take 15 mLs by mouth as needed for Irritation 22   Navdeep Costa DO   montelukast (SINGULAIR) 10 MG tablet Take 1 tablet by mouth daily 22   Brielle Sylvester MD   azelastine (ASTELIN) 0.1 % nasal spray 2 sprays by Nasal route 2 times daily Use in each nostril as directed 21   Brielle Sylvester MD   cetirizine (ZYRTEC) 10 MG tablet Take 10 mg by mouth daily     Historical Provider, MD       Allergies:  Patient has no known allergies. Social History:   TOBACCO:   reports that he has never smoked. He has never used smokeless tobacco.  ETOH:   reports current alcohol use. DRUGS:   reports no history of drug use. PHYSICAL EXAM:      Vital Signs: /82   Pulse 84   Temp 97.8 °F (36.6 °C)   Resp 17   Ht 5' 9\" (1.753 m)   Wt 178 lb (80.7 kg)   SpO2 98%   BMI 26.29 kg/m²    Airway: No stridor or wheezing noted. Good air movement  Pulmonary: without wheezes.   Clear to auscultation  Cardiac:regular rate and rhythm without loud murmurs  Abdomen:soft, nontender,  Bowel sounds present    Pre-Procedure Assessment / Plan:  1) EGD    ASA Grade:  ASA 2 - Patient with mild systemic disease with no functional limitations  Mallampati Classification:  Class II    Level of Sedation Plan:Deep sedation    Post Procedure plan: Return to same level of care    I assessed the patient and find that the patient is in satisfactory condition to proceed with the planned procedure and sedation plan. I have explained the risk, benefits, and alternatives to the procedure; the patient understands and agrees to proceed.        George Suazo MD  12/15/2022

## 2022-12-15 NOTE — DISCHARGE INSTRUCTIONS
Recommendations:   1) Await pathology results   2) Recommend continuing on Pantoprazole as ordered. If biopsies positive for EOE will discuss additional treatment options. If normal we will obtain an ENT evaluation to see if any post-nasal drip is contributing to symptoms. 3) The patient had biopsies taken today. The patient should call for results in 7 days if they have not heard from our office. Our number is [phone].

## 2022-12-15 NOTE — ANESTHESIA PRE PROCEDURE
Department of Anesthesiology  Preprocedure Note       Name:  Meena Angel   Age:  24 y.o.  :  1995                                          MRN:  [id number]         Date:  12/15/2022      Surgeon: Josef Acosta):  Geeta Merida MD    Procedure: Procedure(s):  ESOPHAGOGASTRODUODENOSCOPY    Medications prior to admission:   Prior to Admission medications    Medication Sig Start Date End Date Taking?  Authorizing Provider   Loratadine-Pseudoephedrine (CLARITIN-D 12 HOUR PO) Take by mouth Daily   Yes Historical Provider, MD   pantoprazole (PROTONIX) 40 MG tablet Take 1 tablet by mouth in the morning and at bedtime 22  Mary Whitt MD   lidocaine viscous hcl (XYLOCAINE) 2 % SOLN solution Take 15 mLs by mouth as needed for Irritation 22   Sylvia Jiang DO   montelukast (SINGULAIR) 10 MG tablet Take 1 tablet by mouth daily 22   Mary Whitt MD   azelastine (ASTELIN) 0.1 % nasal spray 2 sprays by Nasal route 2 times daily Use in each nostril as directed 21   Mary Whitt MD   cetirizine (ZYRTEC) 10 MG tablet Take 10 mg by mouth daily     Historical Provider, MD       Current medications:    Current Facility-Administered Medications   Medication Dose Route Frequency Provider Last Rate Last Admin    sodium chloride flush 0.9 % injection 5-40 mL  5-40 mL IntraVENous 2 times per day Brian Lamas MD        sodium chloride flush 0.9 % injection 5-40 mL  5-40 mL IntraVENous PRN Brian Lamas MD        0.9 % sodium chloride infusion   IntraVENous PRN Brian Lamas MD           Allergies:  No Known Allergies    Problem List:    Patient Active Problem List   Diagnosis Code    Allergic rhinitis J30.9    Attention deficit hyperactivity disorder (ADHD), predominantly inattentive type F90.0    Hyperhidrosis R61       Past Medical History:        Diagnosis Date    Allergic rhinitis, seasonal     Attention deficit hyperactivity disorder (ADHD), predominantly inattentive type 08/31/2018    Conjunctivitis, allergic     COVID-19     GERD (gastroesophageal reflux disease)        Past Surgical History:        Procedure Laterality Date    NASAL FRACTURE SURGERY  07/14/2021    with septoplasty       Social History:    Social History     Tobacco Use    Smoking status: Never    Smokeless tobacco: Never    Tobacco comments:     counseled on tobacco exposure avoidance   Substance Use Topics    Alcohol use: Yes     Alcohol/week: 0.0 standard drinks     Types: 5 Cans of beer, 3 Shots of liquor per week     Comment: occasaionlly                                 Counseling given: Not Answered  Tobacco comments: counseled on tobacco exposure avoidance      Vital Signs (Current):   Vitals:    12/14/22 0914 12/15/22 0649 12/15/22 0650   BP:   137/82   Pulse:  84    Resp:  17    Temp:  97.8 °F (36.6 °C)    SpO2:  98%    Weight: 178 lb (80.7 kg) 178 lb (80.7 kg)    Height: 5' 9\" (1.753 m)                                                BP Readings from Last 3 Encounters:   12/15/22 137/82   07/21/22 126/76   07/19/22 (!) 144/96       NPO Status: Time of last liquid consumption: 2300                        Time of last solid consumption: 2300                        Date of last liquid consumption: 12/14/22                        Date of last solid food consumption: 12/14/22    BMI:   Wt Readings from Last 3 Encounters:   12/15/22 178 lb (80.7 kg)   08/11/22 175 lb (79.4 kg)   07/21/22 178 lb (80.7 kg)     Body mass index is 26.29 kg/m².     CBC:   Lab Results   Component Value Date/Time    WBC 4.3 01/12/2021 11:08 AM    RBC 5.51 01/12/2021 11:08 AM    HGB 16.9 01/12/2021 11:08 AM    HCT 49.6 01/12/2021 11:08 AM    MCV 90.1 01/12/2021 11:08 AM    RDW 13.1 01/12/2021 11:08 AM     01/12/2021 11:08 AM       CMP:   Lab Results   Component Value Date/Time     09/17/2021 07:58 AM    K 4.1 09/17/2021 07:58 AM     09/17/2021 07:58 AM    CO2 24 09/17/2021 07:58 AM    BUN 19 09/17/2021 07:58 AM CREATININE 1.1 09/17/2021 07:58 AM    GFRAA >60 09/17/2021 07:58 AM    AGRATIO 2.1 09/17/2021 07:58 AM    LABGLOM >60 09/17/2021 07:58 AM    GLUCOSE 73 09/17/2021 07:58 AM    PROT 7.1 09/17/2021 07:58 AM    CALCIUM 10.0 09/17/2021 07:58 AM    BILITOT 1.3 09/17/2021 07:58 AM    ALKPHOS 59 09/17/2021 07:58 AM    AST 33 09/17/2021 07:58 AM    ALT 36 09/17/2021 07:58 AM       POC Tests: No results for input(s): POCGLU, POCNA, POCK, POCCL, POCBUN, POCHEMO, POCHCT in the last 72 hours.     Coags: No results found for: PROTIME, INR, APTT    HCG (If Applicable): No results found for: PREGTESTUR, PREGSERUM, HCG, HCGQUANT     ABGs: No results found for: PHART, PO2ART, HEV0FGQ, VTD6GWY, BEART, S8IBWENM     Type & Screen (If Applicable):  No results found for: LABABO, LABRH    Drug/Infectious Status (If Applicable):  No results found for: HIV, HEPCAB    COVID-19 Screening (If Applicable):   Lab Results   Component Value Date/Time    COVID19 Negative 12/30/2020 11:13 AM    COVID19 Not Detected 12/04/2020 01:30 PM           Anesthesia Evaluation  Patient summary reviewed no history of anesthetic complications:   Airway: Mallampati: II  TM distance: >3 FB   Neck ROM: full  Mouth opening: > = 3 FB   Dental:      Comment: No loose teeth    Pulmonary: breath sounds clear to auscultation      (-) COPD, asthma, shortness of breath, recent URI and sleep apnea                           Cardiovascular:        (-) hypertension, valvular problems/murmurs, past MI, CAD, CABG/stent, dysrhythmias,  angina,  CHF, orthopnea and no pulmonary hypertension      Rhythm: regular  Rate: normal                    Neuro/Psych:   (+) psychiatric history:   (-) seizures, neuromuscular disease, TIA, CVA and headaches           GI/Hepatic/Renal:   (+) GERD:,      (-) PUD, hepatitis, liver disease, no renal disease and bowel prep       Endo/Other:        (-) diabetes mellitus, hypothyroidism, hyperthyroidism, blood dyscrasia               Abdominal: Vascular: Other Findings:           Anesthesia Plan      MAC     ASA 2       Induction: intravenous. Anesthetic plan and risks discussed with patient. Plan discussed with CRNA. This pre-anesthesia assessment may be used as a history and physical.    DOS STAFF ADDENDUM:    Pt seen and examined, chart reviewed (including anesthesia, drug and allergy history). No interval changes to history and physical examination. Anesthetic plan, risks, benefits, alternatives, and personnel involved discussed with patient. Patient verbalized an understanding and agrees to proceed.       Jesus Noriega MD  December 15, 2022  6:56 AM      Jesus Noriega MD   12/15/2022

## 2022-12-22 DIAGNOSIS — J06.9 VIRAL URI WITH COUGH: ICD-10-CM

## 2022-12-22 DIAGNOSIS — R13.10 DYSPHAGIA, UNSPECIFIED TYPE: ICD-10-CM

## 2022-12-22 NOTE — TELEPHONE ENCOUNTER
Medication:   Requested Prescriptions     Pending Prescriptions Disp Refills    azelastine (ASTELIN) 0.1 % nasal spray 30 mL 1     Si sprays by Nasal route 2 times daily Use in each nostril as directed    pantoprazole (PROTONIX) 40 MG tablet 60 tablet 11     Sig: Take 1 tablet by mouth in the morning and at bedtime       Last Filled:      Patient Phone Number: [phone] (home)     Last appt: 2022   Next appt: 2/3/2023  Return if symptoms worsen or fail to improve.   Last PSA: No results found for: PSA

## 2022-12-23 RX ORDER — AZELASTINE 1 MG/ML
SPRAY, METERED NASAL
Qty: 30 ML | Refills: 1 | Status: SHIPPED | OUTPATIENT
Start: 2022-12-23

## 2022-12-23 RX ORDER — PANTOPRAZOLE SODIUM 40 MG/1
40 TABLET, DELAYED RELEASE ORAL 2 TIMES DAILY
Qty: 60 TABLET | Refills: 11 | Status: SHIPPED | OUTPATIENT
Start: 2022-12-23 | End: 2023-12-23

## 2022-12-23 RX ORDER — AZELASTINE 1 MG/ML
2 SPRAY, METERED NASAL 2 TIMES DAILY
Qty: 30 ML | Refills: 1 | Status: SHIPPED | OUTPATIENT
Start: 2022-12-23 | End: 2022-12-23

## 2022-12-23 NOTE — TELEPHONE ENCOUNTER
Refill request for azelastine (ASTELIN) 0.1 % nasal spray medication. Name of Herve      Last visit - 12/1/22     Pending visit - 2/3/23    Last refill - 12/23/22      Medication Contract signed - PDMP Monitoring:    Last PDMP Anjelica Son as Reviewed:  Review User Review Instant Review Result   DEA SERRANO 7/16/2019 11:18 AM Reviewed PDMP [1]     [unfilled]  Urine Drug Screenings (1 yr)    No resulted procedures found.        Medication Contract and Consent for Opioid Use Documents Filed        No documents found                   Last Court Bolden ran-        Additional Comments

## 2022-12-27 DIAGNOSIS — R05.9 COUGH: ICD-10-CM

## 2022-12-27 RX ORDER — MONTELUKAST SODIUM 10 MG/1
10 TABLET ORAL DAILY
Qty: 30 TABLET | Refills: 0 | Status: SHIPPED | OUTPATIENT
Start: 2022-12-27

## 2022-12-27 NOTE — TELEPHONE ENCOUNTER
Medication:   Requested Prescriptions     Pending Prescriptions Disp Refills    montelukast (SINGULAIR) 10 MG tablet [Pharmacy Med Name: MONTELUKAST 10MG TABLETS] 30 tablet 0     Sig: TAKE 1 TABLET BY MOUTH DAILY        Last Filled:  11/28/22    Patient Phone Number: [phone] (home)     Last appt: 12/1/2022   Next appt: 2/3/2023    Last OARRS:   RX Monitoring 7/16/2019   Attestation -   Periodic Controlled Substance Monitoring No signs of potential drug abuse or diversion identified.

## 2023-01-09 ENCOUNTER — OFFICE VISIT (OUTPATIENT)
Dept: ENT CLINIC | Age: 28
End: 2023-01-09
Payer: COMMERCIAL

## 2023-01-09 VITALS
HEART RATE: 99 BPM | OXYGEN SATURATION: 97 % | HEIGHT: 69 IN | BODY MASS INDEX: 27.11 KG/M2 | DIASTOLIC BLOOD PRESSURE: 84 MMHG | WEIGHT: 183 LBS | SYSTOLIC BLOOD PRESSURE: 142 MMHG

## 2023-01-09 DIAGNOSIS — R22.1 NECK MASS: ICD-10-CM

## 2023-01-09 DIAGNOSIS — K21.9 LARYNGOPHARYNGEAL REFLUX (LPR): ICD-10-CM

## 2023-01-09 DIAGNOSIS — R09.89 GLOBUS SENSATION: Primary | ICD-10-CM

## 2023-01-09 PROCEDURE — G8419 CALC BMI OUT NRM PARAM NOF/U: HCPCS | Performed by: OTOLARYNGOLOGY

## 2023-01-09 PROCEDURE — 1036F TOBACCO NON-USER: CPT | Performed by: OTOLARYNGOLOGY

## 2023-01-09 PROCEDURE — G8427 DOCREV CUR MEDS BY ELIG CLIN: HCPCS | Performed by: OTOLARYNGOLOGY

## 2023-01-09 PROCEDURE — G8484 FLU IMMUNIZE NO ADMIN: HCPCS | Performed by: OTOLARYNGOLOGY

## 2023-01-09 PROCEDURE — 31575 DIAGNOSTIC LARYNGOSCOPY: CPT | Performed by: OTOLARYNGOLOGY

## 2023-01-09 PROCEDURE — 99204 OFFICE O/P NEW MOD 45 MIN: CPT | Performed by: OTOLARYNGOLOGY

## 2023-01-09 NOTE — PROGRESS NOTES
[facility]      Patient Name: Rochelle Han  Medical Record Number:  [id number]  Primary Care Physician:  Arnold Jackson MD  Date of Consultation: 1/9/2023    Chief Complaint: Throat issues        HISTORY OF PRESENT ILLNESS  Angela Traore is a(n) 24 y.o. male who presents for evaluation of throat issues. The patient said he started having issues in March or April 2022. He had a coughing episode and the feeling of something in his throat at that time. He actually saw a gastroenterologist for an EGD last month and there was no concerning lesions. He has been on Protonix which he thinks is help with some of his symptoms, but he still has a feeling that something is in his throat. He is on multiple different medications for possible allergies including Flonase, Astelin, Singulair and an over-the-counter antihistamine decongestant. He does think that these help, but he still has the sensation. He has never had concerning symptoms such as coughing up blood, weight loss or neck mass. He did stop vaping recently as well and it did not help            REVIEW OF SYSTEMS  As above    PHYSICAL EXAM  GENERAL: No Acute Distress, Alert and Oriented, no Hoarseness, strong voice  EYES: EOMI, Anti-icteric  HENT:   Head: Normocephalic and atraumatic. Face:  Symmetric, facial nerve intact, no sinus tenderness  Right Ear: Normal external ear, normal external auditory canal, intact tympanic membrane with normal mobility and aerated middle ear  Left Ear: Normal external ear, normal external auditory canal, intact tympanic membrane with normal mobility and aerated middle ear  Mouth/Oral Cavity:  normal lips, Uvula is midline, no mucosal lesions, no trismus  Oropharynx/Larynx:  normal oropharynx,  Nose:Normal external nasal appearance.     NECK: I cannot palpate any obvious neck masses but he does have some discomfort and an unusual sensation when I palpate the region just below his cricoid. REVIEW OF MEDICAL RECORDS  I reviewed his primary care doctors and his gastroenterologist's notes      PROCEDURE  Flexible laryngoscopy  Afrin and lidocaine were applied to bilateral nasal cavity. A flexible scope was passed in the nasal cavity. The nasopharynx was normal.  The base of tongue and vallecula were normal.  I do not appreciate any masses or lesions. Vocal cords normal        ASSESSMENT/PLAN  1. Globus sensation  The patient's had a fairly thorough work-up for this at this point. I do not feel any obvious neck mass, however I think a CT scan is warranted just to make sure were not missing something. If this is negative I think the neck step would be allergy testing. I will call him with the results of the CT scan. - CT SOFT TISSUE NECK W CONTRAST; Future    2. Laryngopharyngeal reflux (LPR)  The reflux medication did make a difference for a while. I think should continue this for now. If the CT is negative I would have him see allergy for a work-up    3. Neck mass  Ruling this out with a CT scan  - CT SOFT TISSUE NECK W CONTRAST; Future             I have performed a head and neck physical exam personally or was physically present during the key or critical portions of the service. This note was generated completely or in part utilizing Dragon dictation speech recognition software. Occasionally, words are mistranscribed and despite editing, the text may contain inaccuracies due to incorrect word recognition. If further clarification is needed please contact the office at [phone].

## 2023-01-11 DIAGNOSIS — J30.1 ALLERGIC RHINITIS DUE TO POLLEN, UNSPECIFIED SEASONALITY: Primary | ICD-10-CM

## 2023-01-23 ENCOUNTER — OFFICE VISIT (OUTPATIENT)
Dept: PRIMARY CARE CLINIC | Age: 28
End: 2023-01-23
Payer: COMMERCIAL

## 2023-01-23 VITALS
HEART RATE: 80 BPM | BODY MASS INDEX: 26.26 KG/M2 | SYSTOLIC BLOOD PRESSURE: 128 MMHG | WEIGHT: 177.8 LBS | DIASTOLIC BLOOD PRESSURE: 86 MMHG

## 2023-01-23 DIAGNOSIS — R09.89 GLOBUS SENSATION: ICD-10-CM

## 2023-01-23 DIAGNOSIS — R05.9 COUGH: ICD-10-CM

## 2023-01-23 DIAGNOSIS — K21.9 GASTROESOPHAGEAL REFLUX DISEASE WITHOUT ESOPHAGITIS: Primary | ICD-10-CM

## 2023-01-23 PROCEDURE — 99213 OFFICE O/P EST LOW 20 MIN: CPT | Performed by: FAMILY MEDICINE

## 2023-01-23 RX ORDER — MONTELUKAST SODIUM 10 MG/1
10 TABLET ORAL DAILY
Qty: 30 TABLET | Refills: 0 | Status: SHIPPED | OUTPATIENT
Start: 2023-01-23 | End: 2023-01-23

## 2023-01-23 RX ORDER — ESCITALOPRAM OXALATE 10 MG/1
10 TABLET ORAL DAILY
Qty: 30 TABLET | Refills: 3 | Status: SHIPPED | OUTPATIENT
Start: 2023-01-23

## 2023-01-23 ASSESSMENT — PATIENT HEALTH QUESTIONNAIRE - PHQ9
SUM OF ALL RESPONSES TO PHQ9 QUESTIONS 1 & 2: 0
SUM OF ALL RESPONSES TO PHQ QUESTIONS 1-9: 0
1. LITTLE INTEREST OR PLEASURE IN DOING THINGS: 0
2. FEELING DOWN, DEPRESSED OR HOPELESS: 0
SUM OF ALL RESPONSES TO PHQ QUESTIONS 1-9: 0

## 2023-01-23 NOTE — PROGRESS NOTES
Chief Complaint   Patient presents with    Gastroesophageal Reflux       HPI: Julianna Salvador  presents for evaluation and management of reflux and sensation of fullness in his throat. Richard notes that he saw Dr. Dot Larkin for his EGD which was unremarkable and was subsequently referred to Dr. Neli Parker who did an upper rhinopharyngitis committee and noted that he did not see any particular problems either and was subsequently referred to an allergist, Dr. Neris Osorio who he is going to be seeing next week. He notes he is having a sensation of fullness in his throat. He denies any significant anxiety however. Notes no issues with breathing or swallowing. Today's PHQ:    PHQ Scores 1/23/2023 7/21/2022 6/24/2021 10/1/2020 2/8/2019 8/31/2018 5/31/2017   PHQ2 Score 0 0 0 0 0 0 0   PHQ9 Score 0 0 0 0 0 0 0     Interpretation of Total Score Depression Severity: 1-4 = Minimal depression, 5-9 = Mild depression, 10-14 = Moderate depression, 15-19 = Moderately severe depression, 20-27 = Severe depression        Review of Systems    No Known Allergies  New Prescriptions    ESCITALOPRAM (LEXAPRO) 10 MG TABLET    Take 1 tablet by mouth daily     Current Outpatient Medications   Medication Sig Dispense Refill    escitalopram (LEXAPRO) 10 MG tablet Take 1 tablet by mouth daily 30 tablet 3    pantoprazole (PROTONIX) 40 MG tablet Take 1 tablet by mouth in the morning and at bedtime 60 tablet 11    azelastine (ASTELIN) 0.1 % nasal spray USE 2 SPRAYS IN EACH NOSTRIL TWICE DAILY AS DIRECTED 30 mL 1    lidocaine viscous hcl (XYLOCAINE) 2 % SOLN solution Take 15 mLs by mouth as needed for Irritation (Patient not taking: Reported on 1/23/2023) 100 mL 0     No current facility-administered medications for this visit.        Past Medical History:   Diagnosis Date    Allergic rhinitis, seasonal     Attention deficit hyperactivity disorder (ADHD), predominantly inattentive type 08/31/2018    Conjunctivitis, allergic     COVID-19     GERD (gastroesophageal reflux disease)          Objective   /86   Pulse 80   Wt 177 lb 12.8 oz (80.6 kg)   BMI 26.26 kg/m²   Wt Readings from Last 3 Encounters:   01/23/23 177 lb 12.8 oz (80.6 kg)   01/09/23 183 lb (83 kg)   12/15/22 178 lb (80.7 kg)       Physical Exam  Constitutional:       Appearance: He is well-developed. Cardiovascular:      Rate and Rhythm: Normal rate and regular rhythm. Pulses:           Dorsalis pedis pulses are 2+ on the right side and 2+ on the left side. Posterior tibial pulses are 2+ on the right side and 2+ on the left side. Heart sounds: No murmur heard. No friction rub. No gallop. Comments: No Lower Extremity Edema  Pulmonary:      Effort: Pulmonary effort is normal.      Breath sounds: Normal breath sounds. No wheezing or rales. Abdominal:      General: Bowel sounds are normal. There is no distension. Palpations: Abdomen is soft. There is no mass. Tenderness: There is no abdominal tenderness. Skin:     General: Skin is warm and dry. Findings: No rash.          Chemistry        Component Value Date/Time     09/17/2021 0758    K 4.1 09/17/2021 0758     09/17/2021 0758    CO2 24 09/17/2021 0758    BUN 19 09/17/2021 0758    CREATININE 1.1 09/17/2021 0758        Component Value Date/Time    CALCIUM 10.0 09/17/2021 0758    ALKPHOS 59 09/17/2021 0758    AST 33 09/17/2021 0758    ALT 36 09/17/2021 0758    BILITOT 1.3 (H) 09/17/2021 0758          Lab Results   Component Value Date    WBC 4.3 01/12/2021    HGB 16.9 01/12/2021    HCT 49.6 01/12/2021    MCV 90.1 01/12/2021     01/12/2021     No results found for: LABA1C  No results found for: EAG  No results found for: LABA1C  No components found for: CHLPL  Lab Results   Component Value Date    TRIG 44 09/17/2021     Lab Results   Component Value Date    HDL 62 (H) 09/17/2021     Lab Results   Component Value Date    LDLCALC 60 09/17/2021     Lab Results   Component Value Date    LABVLDL 9 09/17/2021         Assessment   Plan   1. Gastroesophageal reflux disease without esophagitis  Will: He has decreased to 1 Protonix a day and has not noticed any significant change. We will continue at this dosage and follow-up in 3 months    2. Globus sensation  Keep follow-up with allergy as recommended. We will trial Lexapro and follow-up in 3 months  - escitalopram (LEXAPRO) 10 MG tablet; Take 1 tablet by mouth daily  Dispense: 30 tablet; Refill: 3        RTC Return in about 3 months (around 4/23/2023).

## 2023-01-23 NOTE — TELEPHONE ENCOUNTER
Medication:   Requested Prescriptions     Pending Prescriptions Disp Refills    montelukast (SINGULAIR) 10 MG tablet [Pharmacy Med Name: MONTELUKAST 10MG TABLETS] 30 tablet 0     Sig: TAKE 1 TABLET BY MOUTH DAILY        Last Filled:  12/27/22    Patient Phone Number: [phone] (home)     Last appt: 12/1/2022   Next appt: 1/23/2023    Last OARRS:   RX Monitoring 7/16/2019   Attestation -   Periodic Controlled Substance Monitoring No signs of potential drug abuse or diversion identified.

## 2023-02-01 ENCOUNTER — PATIENT MESSAGE (OUTPATIENT)
Dept: PRIMARY CARE CLINIC | Age: 28
End: 2023-02-01

## 2023-02-01 NOTE — TELEPHONE ENCOUNTER
From: Curtis Chung  To: Dr. Davis Newer: 2/1/2023 7:59 AM EST  Subject: Allergy test    I went and got my allergy test done. Im not sure if the doctor contacted you but he said he would. Seems that Im allergic to a few things. But I still have the throat issues even without eating food. They were saying something about post nasal drip. Is there anything I can take for that?

## 2023-02-06 ENCOUNTER — PATIENT MESSAGE (OUTPATIENT)
Dept: PRIMARY CARE CLINIC | Age: 28
End: 2023-02-06

## 2023-02-06 DIAGNOSIS — R22.1 NECK SWELLING: Primary | ICD-10-CM

## 2023-02-06 NOTE — TELEPHONE ENCOUNTER
From: Alfredito Driscoll  To: Dr. Jose De Jesus Root: 2/6/2023 7:14 AM EST  Subject: Neck swelling    Good morning. As you know, I have been dealing with throat issues. Im not sure how related it is or if it is at all but I have got some neck swelling. I sent a photo of where the swelling is.  Thank you

## 2023-02-07 ENCOUNTER — HOSPITAL ENCOUNTER (OUTPATIENT)
Dept: ULTRASOUND IMAGING | Age: 28
Discharge: HOME | End: 2023-02-07
Payer: COMMERCIAL

## 2023-02-07 ENCOUNTER — OFFICE VISIT (OUTPATIENT)
Dept: PRIMARY CARE CLINIC | Age: 28
End: 2023-02-07
Payer: COMMERCIAL

## 2023-02-07 VITALS
SYSTOLIC BLOOD PRESSURE: 136 MMHG | HEART RATE: 88 BPM | WEIGHT: 174.6 LBS | DIASTOLIC BLOOD PRESSURE: 90 MMHG | BODY MASS INDEX: 25.78 KG/M2

## 2023-02-07 DIAGNOSIS — E06.9 THYROIDITIS: ICD-10-CM

## 2023-02-07 DIAGNOSIS — R22.1 NECK SWELLING: ICD-10-CM

## 2023-02-07 DIAGNOSIS — E06.9 THYROIDITIS: Primary | ICD-10-CM

## 2023-02-07 LAB
T3 FREE: 3.1 PG/ML (ref 2.3–4.2)
T4 FREE: 1.3 NG/DL (ref 0.9–1.8)
THYROID PEROXIDASE (TPO) ABS: 9 IU/ML
TSH REFLEX: 1.13 UIU/ML (ref 0.27–4.2)

## 2023-02-07 PROCEDURE — 99214 OFFICE O/P EST MOD 30 MIN: CPT | Performed by: FAMILY MEDICINE

## 2023-02-07 PROCEDURE — 76536 US EXAM OF HEAD AND NECK: CPT

## 2023-02-07 SDOH — ECONOMIC STABILITY: FOOD INSECURITY: WITHIN THE PAST 12 MONTHS, THE FOOD YOU BOUGHT JUST DIDN'T LAST AND YOU DIDN'T HAVE MONEY TO GET MORE.: NEVER TRUE

## 2023-02-07 SDOH — ECONOMIC STABILITY: TRANSPORTATION INSECURITY
IN THE PAST 12 MONTHS, HAS LACK OF TRANSPORTATION KEPT YOU FROM MEETINGS, WORK, OR FROM GETTING THINGS NEEDED FOR DAILY LIVING?: NO

## 2023-02-07 SDOH — ECONOMIC STABILITY: FOOD INSECURITY: WITHIN THE PAST 12 MONTHS, YOU WORRIED THAT YOUR FOOD WOULD RUN OUT BEFORE YOU GOT MONEY TO BUY MORE.: NEVER TRUE

## 2023-02-07 SDOH — ECONOMIC STABILITY: INCOME INSECURITY: HOW HARD IS IT FOR YOU TO PAY FOR THE VERY BASICS LIKE FOOD, HOUSING, MEDICAL CARE, AND HEATING?: NOT VERY HARD

## 2023-02-07 SDOH — ECONOMIC STABILITY: HOUSING INSECURITY
IN THE LAST 12 MONTHS, WAS THERE A TIME WHEN YOU DID NOT HAVE A STEADY PLACE TO SLEEP OR SLEPT IN A SHELTER (INCLUDING NOW)?: NO

## 2023-02-07 SDOH — ECONOMIC STABILITY: INCOME INSECURITY: HOW HARD IS IT FOR YOU TO PAY FOR THE VERY BASICS LIKE FOOD, HOUSING, MEDICAL CARE, AND HEATING?: NOT HARD AT ALL

## 2023-02-07 NOTE — RESULT ENCOUNTER NOTE
Good Morning \"Richard\" ,    Thank you for getting your thyroid ultrasound done. Your results are back and they appear normal..      I hope you are feeling better,    Dr. Azeem Corcoran      For your convenience I have listed your future appointment(s) below:  No future appointments.

## 2023-02-08 ENCOUNTER — PATIENT MESSAGE (OUTPATIENT)
Dept: PRIMARY CARE CLINIC | Age: 28
End: 2023-02-08

## 2023-02-08 DIAGNOSIS — R22.1 NECK SWELLING: Primary | ICD-10-CM

## 2023-02-08 NOTE — RESULT ENCOUNTER NOTE
Good Morning \"Richard\" ,    I am happy to report that your labs look good. We will check your thyroid ultrasound but I expect it to also appear normal.  Hopefully this is the end of it and you continue to feel better over the next few weeks. If not we may need to do further work-up.     Let me know how you are doing,    Dr. Parr Setting

## 2023-02-09 NOTE — TELEPHONE ENCOUNTER
From: Cande Ordonez  To: Dr. Idell Babinski: 2/8/2023 3:12 PM EST  Subject: Question regarding THYROID PEROXIDASE AB    Im still consistently getting the feeling at the bottom of my throat, it makes me feel like I need to swallow and then right after I burp. Then I try and clear my throat, substance seems to come out while clearing it.  This has been going on for a couple months now

## 2023-02-13 ENCOUNTER — PATIENT MESSAGE (OUTPATIENT)
Dept: PRIMARY CARE CLINIC | Age: 28
End: 2023-02-13

## 2023-02-13 DIAGNOSIS — K52.9 AGE (ACUTE GASTROENTERITIS): Primary | ICD-10-CM

## 2023-02-13 RX ORDER — ONDANSETRON 4 MG/1
4 TABLET, FILM COATED ORAL DAILY PRN
Qty: 30 TABLET | Refills: 0 | Status: SHIPPED | OUTPATIENT
Start: 2023-02-13

## 2023-02-13 NOTE — TELEPHONE ENCOUNTER
From: Billy Hernandez  To: Dr. Alba Layton: 2/13/2023 2:50 PM EST  Subject: Major issues today    I woke up this morning at 5 and I had diarrhea, tried to go back to bed but I couldnt. A little bit later I went to go again and it was more liquid. Then I had to throw up, this has happened 3+ times today and I also saw white substance like mucus in my stool. My girlfriend had the same issue last week and apparently she got it from a kid at school, shes a teacher. Im guessing some kind of infection? Is there any meds I can take like antibiotics?  Thank you

## 2023-02-23 DIAGNOSIS — R05.9 COUGH: ICD-10-CM

## 2023-02-23 RX ORDER — MONTELUKAST SODIUM 10 MG/1
10 TABLET ORAL DAILY
Qty: 30 TABLET | Refills: 0 | Status: SHIPPED | OUTPATIENT
Start: 2023-02-23

## 2023-03-06 ENCOUNTER — PATIENT MESSAGE (OUTPATIENT)
Dept: PRIMARY CARE CLINIC | Age: 28
End: 2023-03-06

## 2023-03-06 NOTE — TELEPHONE ENCOUNTER
From: Alexis Galvez  To: Dr. Mehnaz Arroyo: 3/6/2023 11:36 AM EST  Subject: Neck pain again along with throat issues    Yesterday my neck started hurting again in the same spot it was the last time I had the neck pain. My throat issues have been better but I still have that feeling off and on every day. Its been a little worse the last couple weeks though. The throat feeling is right below my Germáns apple. Its like theres something caught in there. Or somethings coming back up.

## 2023-03-23 ENCOUNTER — PATIENT MESSAGE (OUTPATIENT)
Dept: PRIMARY CARE CLINIC | Age: 28
End: 2023-03-23

## 2023-03-23 DIAGNOSIS — R05.9 COUGH: ICD-10-CM

## 2023-03-23 RX ORDER — MONTELUKAST SODIUM 10 MG/1
10 TABLET ORAL DAILY
Qty: 30 TABLET | Refills: 5 | Status: SHIPPED | OUTPATIENT
Start: 2023-03-23 | End: 2023-05-01 | Stop reason: SDUPTHER

## 2023-03-24 NOTE — TELEPHONE ENCOUNTER
From: Mustapha Castellon  To: Dr. Félix Rapp: 3/23/2023 10:49 PM EDT  Subject: White spot on tonsil area    I have a white spot on my right tonsil. Its uncomfortable and it hurts to swallow. Jennyfer Tolentino feels like I have a sore throat.  I do not have any other symptoms besides my normal lump in my throat feeling occasionally

## 2023-05-01 ENCOUNTER — OFFICE VISIT (OUTPATIENT)
Dept: PRIMARY CARE CLINIC | Age: 28
End: 2023-05-01
Payer: COMMERCIAL

## 2023-05-01 VITALS
SYSTOLIC BLOOD PRESSURE: 112 MMHG | WEIGHT: 176 LBS | DIASTOLIC BLOOD PRESSURE: 85 MMHG | OXYGEN SATURATION: 99 % | BODY MASS INDEX: 26.07 KG/M2 | HEART RATE: 79 BPM | HEIGHT: 69 IN | TEMPERATURE: 97.6 F

## 2023-05-01 DIAGNOSIS — J03.90 ACUTE TONSILLITIS, UNSPECIFIED ETIOLOGY: Primary | ICD-10-CM

## 2023-05-01 DIAGNOSIS — J30.1 ALLERGIC RHINITIS DUE TO POLLEN, UNSPECIFIED SEASONALITY: ICD-10-CM

## 2023-05-01 DIAGNOSIS — R13.10 DYSPHAGIA, UNSPECIFIED TYPE: ICD-10-CM

## 2023-05-01 PROCEDURE — 99214 OFFICE O/P EST MOD 30 MIN: CPT | Performed by: FAMILY MEDICINE

## 2023-05-01 RX ORDER — MONTELUKAST SODIUM 10 MG/1
10 TABLET ORAL DAILY
Qty: 30 TABLET | Refills: 5 | Status: SHIPPED | OUTPATIENT
Start: 2023-05-01

## 2023-05-01 RX ORDER — AZELASTINE 1 MG/ML
SPRAY, METERED NASAL
Qty: 30 ML | Refills: 1 | Status: SHIPPED | OUTPATIENT
Start: 2023-05-01

## 2023-05-01 NOTE — PATIENT INSTRUCTIONS
Kip Padilla: Dr. Mariela Jauregui  [address] [place] [address]  Phone: [phone]  Fax: [phone]\"

## 2023-05-01 NOTE — PROGRESS NOTES
Chief Complaint   Patient presents with    Gastroesophageal Reflux       HPI: Addis Bower  presents for evaluation and management of throat sensation and difficulty swallowing. Richard notes that he continues to have a sensation of something in his throat. It comes and goes intermittently and it seems like Protonix helps for couple days and then makes it worse and he stops and gets better but then recurs. He said this is happened also with Pepcid. He saw Dr. Yolis Calderón a year ago and had endoscopy with biopsies which were negative for eosinophilic esophagitis. He notes no neck pain today. He does note that his right tonsil seems to be enlarged and he massaged it the other day and got approximately 7 tonsilloliths out of it. Review of Systems    No Known Allergies  New Prescriptions    No medications on file     Current Outpatient Medications   Medication Sig Dispense Refill    azelastine (ASTELIN) 0.1 % nasal spray USE 2 SPRAYS IN EACH NOSTRIL TWICE DAILY AS DIRECTED 30 mL 1    montelukast (SINGULAIR) 10 MG tablet Take 1 tablet by mouth daily 30 tablet 5    ondansetron (ZOFRAN) 4 MG tablet Take 1 tablet by mouth daily as needed for Nausea or Vomiting (Patient not taking: Reported on 5/1/2023) 30 tablet 0    escitalopram (LEXAPRO) 10 MG tablet Take 1 tablet by mouth daily (Patient not taking: Reported on 2/7/2023) 30 tablet 3    pantoprazole (PROTONIX) 40 MG tablet Take 1 tablet by mouth in the morning and at bedtime (Patient not taking: Reported on 2/7/2023) 60 tablet 11    lidocaine viscous hcl (XYLOCAINE) 2 % SOLN solution Take 15 mLs by mouth as needed for Irritation (Patient not taking: Reported on 1/23/2023) 100 mL 0     No current facility-administered medications for this visit.        Past Medical History:   Diagnosis Date    Allergic rhinitis, seasonal     Attention deficit hyperactivity disorder (ADHD), predominantly inattentive type 08/31/2018    Conjunctivitis, allergic     COVID-19     GERD

## 2023-05-08 ENCOUNTER — OFFICE VISIT (OUTPATIENT)
Dept: ENT CLINIC | Age: 28
End: 2023-05-08
Payer: COMMERCIAL

## 2023-05-08 VITALS
OXYGEN SATURATION: 98 % | WEIGHT: 179 LBS | RESPIRATION RATE: 16 BRPM | SYSTOLIC BLOOD PRESSURE: 129 MMHG | BODY MASS INDEX: 26.51 KG/M2 | DIASTOLIC BLOOD PRESSURE: 79 MMHG | HEART RATE: 81 BPM | HEIGHT: 69 IN

## 2023-05-08 DIAGNOSIS — R09.89 GLOBUS SENSATION: Primary | ICD-10-CM

## 2023-05-08 DIAGNOSIS — J35.01 CHRONIC TONSILLITIS: ICD-10-CM

## 2023-05-08 DIAGNOSIS — K21.9 LARYNGOPHARYNGEAL REFLUX (LPR): ICD-10-CM

## 2023-05-08 PROCEDURE — 99214 OFFICE O/P EST MOD 30 MIN: CPT | Performed by: OTOLARYNGOLOGY

## 2023-05-08 NOTE — PROGRESS NOTES
[address] [facility] SURGERY  Follow up      Patient Name: Pérez Raymond  Medical Record Number:  [id number]  Primary Care Physician:  Mary Lou Velasquez MD  Date of Consultation: 5/8/2023    Chief Complaint: Throat issues        Interval History    Patient is following up for his throat issues. I last saw him in January 2023. At that time I did order a CT scan, but he told me that the out-of-pocket cost was too expensive. He says that he is still having some of the same issues. Every time after he eats he has this feeling in the back of his throat. He said that he has been getting a lot of tonsil stones out. This has been occurring for months. He has stopped vaping about 2 weeks ago entirely. I also sent him to allergy. He did get tested and had some positive testing but has not been doing immunotherapy. REVIEW OF SYSTEMS   as above    PHYSICAL EXAM  GENERAL: No Acute Distress, Alert and Oriented, no Hoarseness, strong voice  EYES: EOMI, Anti-icteric  HENT:   Head: Normocephalic and atraumatic. Face:  Symmetric, facial nerve intact, no sinus tenderness  Right Ear: Normal external ear, normal external auditory canal, intact tympanic membrane with normal mobility and aerated middle ear  Left Ear: Normal external ear, normal external auditory canal, intact tympanic membrane with normal mobility and aerated middle ear  Mouth/Oral Cavity:  normal lips, Uvula is midline, no mucosal lesions  Oropharynx/Larynx: Cryptic tonsils that are not infected  Nose:Normal external nasal appearance. NECK: Normal range of motion, no thyromegaly, trachea is midline, no lymphadenopathy, no neck masses, no crepitus            ASSESSMENT/PLAN  1. Globus sensation  I am not sure the exact etiology of his globus sensation. We have already tried gastroenterology and allergy. He is having some symptoms related to his tonsils at this time and certainly this could be playing a role.   I

## 2023-05-12 ENCOUNTER — OFFICE VISIT (OUTPATIENT)
Dept: PRIMARY CARE CLINIC | Age: 28
End: 2023-05-12
Payer: COMMERCIAL

## 2023-05-12 VITALS
HEIGHT: 69 IN | TEMPERATURE: 98.8 F | DIASTOLIC BLOOD PRESSURE: 79 MMHG | BODY MASS INDEX: 26.1 KG/M2 | SYSTOLIC BLOOD PRESSURE: 124 MMHG | WEIGHT: 176.2 LBS | HEART RATE: 74 BPM

## 2023-05-12 DIAGNOSIS — M25.531 RIGHT WRIST PAIN: Primary | ICD-10-CM

## 2023-05-12 PROCEDURE — 99214 OFFICE O/P EST MOD 30 MIN: CPT | Performed by: STUDENT IN AN ORGANIZED HEALTH CARE EDUCATION/TRAINING PROGRAM

## 2023-05-12 RX ORDER — METHYLPREDNISOLONE 4 MG/1
TABLET ORAL
Qty: 1 KIT | Refills: 0 | Status: SHIPPED | OUTPATIENT
Start: 2023-05-12 | End: 2023-05-18

## 2023-05-12 NOTE — PROGRESS NOTES
2023     Charisma Lofton (:  1995) is a 34 y.o. male, here for evaluation of the following medical concerns:    HPI  Wrist Pain  Patient complains of right wrist pain. The pain is moderate, worsens with bowling, and some relief by rest.  There is not associated numbness in the right hand. Pain has been present for 1 month. There is not a history of injury, but definitely overuse. This is unrelated to a previous injury he had been seeing Dr. Shey Starkey for, which was some pain and swelling of the back of his same hand. This occurred after he fell backwards onto an extended wrist.  He had a normal x-ray about 6 weeks ago. Review of Systems   Constitutional:  Positive for activity change. Musculoskeletal:  Positive for arthralgias and joint swelling. Neurological:  Negative for weakness and numbness. Hematological:  Does not bruise/bleed easily. Prior to Visit Medications    Medication Sig Taking? Authorizing Provider   methylPREDNISolone (MEDROL DOSEPACK) 4 MG tablet Take by mouth.  Yes Fran Crabtree DO   azelastine (ASTELIN) 0.1 % nasal spray USE 2 SPRAYS IN EACH NOSTRIL TWICE DAILY AS DIRECTED Yes Hien Dorantes MD   montelukast (SINGULAIR) 10 MG tablet Take 1 tablet by mouth daily Yes Hien Dorantes MD   pantoprazole (PROTONIX) 40 MG tablet Take 1 tablet by mouth in the morning and at bedtime Yes Hien Dorantes MD   lidocaine viscous hcl (XYLOCAINE) 2 % SOLN solution Take 15 mLs by mouth as needed for Irritation Yes Fran Crabtree DO   ondansetron (ZOFRAN) 4 MG tablet Take 1 tablet by mouth daily as needed for Nausea or Vomiting  Patient not taking: Reported on 2023  Hien Dorantes MD   escitalopram (LEXAPRO) 10 MG tablet Take 1 tablet by mouth daily  Patient not taking: Reported on 2023  Hien Dorantes MD        Social History     Tobacco Use    Smoking status: Never    Smokeless tobacco: Never    Tobacco comments:     counseled on tobacco exposure avoidance

## 2023-05-18 DIAGNOSIS — R09.89 GLOBUS SENSATION: Primary | ICD-10-CM

## 2023-05-18 SDOH — HEALTH STABILITY: PHYSICAL HEALTH: ON AVERAGE, HOW MANY DAYS PER WEEK DO YOU ENGAGE IN MODERATE TO STRENUOUS EXERCISE (LIKE A BRISK WALK)?: 4 DAYS

## 2023-05-18 SDOH — HEALTH STABILITY: PHYSICAL HEALTH: ON AVERAGE, HOW MANY MINUTES DO YOU ENGAGE IN EXERCISE AT THIS LEVEL?: 60 MIN

## 2023-05-18 ASSESSMENT — SOCIAL DETERMINANTS OF HEALTH (SDOH)
WITHIN THE LAST YEAR, HAVE TO BEEN RAPED OR FORCED TO HAVE ANY KIND OF SEXUAL ACTIVITY BY YOUR PARTNER OR EX-PARTNER?: NO
WITHIN THE LAST YEAR, HAVE YOU BEEN HUMILIATED OR EMOTIONALLY ABUSED IN OTHER WAYS BY YOUR PARTNER OR EX-PARTNER?: NO
WITHIN THE LAST YEAR, HAVE YOU BEEN KICKED, HIT, SLAPPED, OR OTHERWISE PHYSICALLY HURT BY YOUR PARTNER OR EX-PARTNER?: NO
WITHIN THE LAST YEAR, HAVE YOU BEEN AFRAID OF YOUR PARTNER OR EX-PARTNER?: NO

## 2023-05-19 ENCOUNTER — OFFICE VISIT (OUTPATIENT)
Dept: ORTHOPEDIC SURGERY | Age: 28
End: 2023-05-19

## 2023-05-19 VITALS — BODY MASS INDEX: 26.07 KG/M2 | HEIGHT: 69 IN | RESPIRATION RATE: 16 BRPM | WEIGHT: 176 LBS

## 2023-05-19 DIAGNOSIS — S63.654A SPRAIN OF METACARPOPHALANGEAL (MCP) JOINT OF RIGHT RING FINGER, INITIAL ENCOUNTER: Primary | ICD-10-CM

## 2023-05-19 NOTE — PROGRESS NOTES
Mr. Jodie Abad is a 34 y.o. right handed man  who is seen today in Hand Surgical Consultation at the request of Jody Morgan DO. He is seen today regarding an injury occurring on April 7th, 2023. He reports injuring his right Middle Finger and Ring Finger, having slipped and fallen while fishing on a boat in [place]. At the time of injury, there was not clear dislocation or malposition of the finger. He was not seen for Emergency evaluation elsewhere, radiographs were obtained & he has not been immobilized. By report, there  was not an associated skin injury. He reports mild pain located in the Dorsal aspect of the between the middle finger and ring finger metacarpo-phalangeal juan, no tenderness of the remaining hand, wrist, or elbow. He notes today, no neurologic symptoms in the Whole Hand. Symptoms are slowly improving over time. I have today reviewed with Jodie Abad the clinically relevant, past medical history, medications, allergies,  family history, social history, and Review Of Systems & I have documented any details relevant to today's presenting complaints in my history above. Mr. Radha Alvarado self-reported past medical history, medications, allergies,  family history, social history, and Review Of Systems have been scanned into the chart under the \"Media\" tab. Physical Exam:  Mr. Radha Alvarado most recent vitals:  Vitals  Respirations: 16  Height: 5' 9\" (175.3 cm)  Weight - Scale: 176 lb (79.8 kg)    He is well nourished, oriented to person, place & time. He demonstrates appropriate mood and affect as well as normal gait and station. Skin: Normal in appearance, Normal Color, and Free of Lesions Bilaterally   Digital range of motion is without significant limitation in the Whole Hand on the Right, normal on the Left. FDS, FDP, and Common Extensor tendon function is intact to each digit. FPL & EPL tendon function is intact to the thumb.    Wrist range of motion is Full

## 2023-06-09 ENCOUNTER — PROCEDURE VISIT (OUTPATIENT)
Dept: SPEECH THERAPY | Age: 28
End: 2023-06-09

## 2023-06-09 ENCOUNTER — OFFICE VISIT (OUTPATIENT)
Dept: ENT CLINIC | Age: 28
End: 2023-06-09
Payer: COMMERCIAL

## 2023-06-09 DIAGNOSIS — K21.9 LARYNGOPHARYNGEAL REFLUX (LPR): ICD-10-CM

## 2023-06-09 DIAGNOSIS — R13.10 DYSPHAGIA, UNSPECIFIED TYPE: Primary | ICD-10-CM

## 2023-06-09 DIAGNOSIS — R09.89 GLOBUS SENSATION: ICD-10-CM

## 2023-06-09 DIAGNOSIS — J35.01 CHRONIC TONSILLITIS: ICD-10-CM

## 2023-06-09 DIAGNOSIS — R09.89 GLOBUS SENSATION: Primary | ICD-10-CM

## 2023-06-09 PROCEDURE — 99213 OFFICE O/P EST LOW 20 MIN: CPT | Performed by: OTOLARYNGOLOGY

## 2023-06-09 RX ORDER — OMEPRAZOLE 40 MG/1
40 CAPSULE, DELAYED RELEASE ORAL
Qty: 90 CAPSULE | Refills: 1 | Status: SHIPPED | OUTPATIENT
Start: 2023-06-09

## 2023-06-09 NOTE — PROGRESS NOTES
tonsillectomy, but were going to continue to treat reflux at this time. I have performed a head and neck physical exam personally or was physically present during the key or critical portions of the service. This note was generated completely or in part utilizing Dragon dictation speech recognition software. Occasionally, words are mistranscribed and despite editing, the text may contain inaccuracies due to incorrect word recognition. If further clarification is needed please contact the office at [phone].

## 2023-06-09 NOTE — PROGRESS NOTES
[facility]) Ear, Nose, and Throat  SPEECH THERAPY  Fiberoptic Endoscopic Evaluation of Swallowing  (FEES)/Treatment      Name: Shankar Mar  YOB: 1995  Gender: male  MRN: [id number]  Referring Physician: Dr. Valerio Amin  Diagnosis: Dysphagia, unspecified; Globus  Onset Date: ~2022  History of Present Illness/Injury:    Patient Active Problem List    Diagnosis Date Noted    Hyperhidrosis 01/16/2019    Attention deficit hyperactivity disorder (ADHD), predominantly inattentive type 08/31/2018    Allergic rhinitis 05/08/2011     Date of Exam: 6/9/2023    Recent US Thyroid (2/7/23)  IMPRESSION:  Unremarkable thyroid ultrasound. Previous SLP Evaluations- NA    Per ENT Note, Dr. Valerio Amin, 5/8/23-  \"I also told him it is reasonable to see our speech therapist before we commit to a tonsillectomy. Perhaps she will have another idea that we have not thought of. The patient said he would like to do this before committing to surgery. I think this is reasonable. I will see him with our speech therapist.\"    Patient Complaints/Reason for Referral:  Shankar Mar was referred for a FEES to assess the efficiency of his/her swallow function, assess for aspiration, and to make recommendations regarding safe dietary consistencies, effective compensatory strategies, and safe eating environment. BACKGROUND HISTORY:   Pt referred by ENT for dysphagia, globus sensation and LPR; pt reports persistent globus sensation, initial onset late 2022, however gradually worsening last few months. Has noted globus increases based upon management of reflux, cavernous tonsils, and vaping; working on vaping cessation however difficult in social situations. Globus worse when supine d/t constrictive sensation. Reported mild dysphagia w/ intermittent odynophagia and sensation of backflow; denied coughing/choking during meals.  Trialed Protonix for LPR management per ENT; reporting initial benefits followed by worsening of symptoms,

## 2023-06-28 ENCOUNTER — APPOINTMENT (OUTPATIENT)
Dept: GENERAL RADIOLOGY | Age: 28
End: 2023-06-28
Payer: COMMERCIAL

## 2023-06-28 ENCOUNTER — NURSE TRIAGE (OUTPATIENT)
Dept: OTHER | Facility: CLINIC | Age: 28
End: 2023-06-28

## 2023-06-28 ENCOUNTER — HOSPITAL ENCOUNTER (EMERGENCY)
Age: 28
Discharge: HOME | End: 2023-06-28
Attending: EMERGENCY MEDICINE
Payer: COMMERCIAL

## 2023-06-28 VITALS
TEMPERATURE: 97.4 F | HEART RATE: 96 BPM | OXYGEN SATURATION: 96 % | WEIGHT: 180 LBS | DIASTOLIC BLOOD PRESSURE: 81 MMHG | BODY MASS INDEX: 26.66 KG/M2 | RESPIRATION RATE: 17 BRPM | HEIGHT: 69 IN | SYSTOLIC BLOOD PRESSURE: 133 MMHG

## 2023-06-28 DIAGNOSIS — R07.9 CHEST PAIN, UNSPECIFIED TYPE: Primary | ICD-10-CM

## 2023-06-28 DIAGNOSIS — F41.1 ANXIETY STATE: ICD-10-CM

## 2023-06-28 LAB
ALBUMIN SERPL-MCNC: 4.6 G/DL (ref 3.4–5)
ALBUMIN/GLOB SERPL: 1.7 {RATIO} (ref 1.1–2.2)
ALP SERPL-CCNC: 51 U/L (ref 40–129)
ALT SERPL-CCNC: 26 U/L (ref 10–40)
ANION GAP SERPL CALCULATED.3IONS-SCNC: 10 MMOL/L (ref 3–16)
AST SERPL-CCNC: 29 U/L (ref 15–37)
BASOPHILS # BLD: 0 K/UL (ref 0–0.2)
BASOPHILS NFR BLD: 0.5 %
BILIRUB SERPL-MCNC: 1 MG/DL (ref 0–1)
BUN SERPL-MCNC: 18 MG/DL (ref 7–20)
CALCIUM SERPL-MCNC: 10.1 MG/DL (ref 8.3–10.6)
CHLORIDE SERPL-SCNC: 104 MMOL/L (ref 99–110)
CO2 SERPL-SCNC: 27 MMOL/L (ref 21–32)
CREAT SERPL-MCNC: 1 MG/DL (ref 0.9–1.3)
DEPRECATED RDW RBC AUTO: 12.5 % (ref 12.4–15.4)
EKG ATRIAL RATE: 84 BPM
EKG DIAGNOSIS: NORMAL
EKG P AXIS: 66 DEGREES
EKG P-R INTERVAL: 134 MS
EKG Q-T INTERVAL: 354 MS
EKG QRS DURATION: 94 MS
EKG QTC CALCULATION (BAZETT): 418 MS
EKG R AXIS: 78 DEGREES
EKG T AXIS: 46 DEGREES
EKG VENTRICULAR RATE: 84 BPM
EOSINOPHIL # BLD: 0.1 K/UL (ref 0–0.6)
EOSINOPHIL NFR BLD: 1.9 %
GFR SERPLBLD CREATININE-BSD FMLA CKD-EPI: >60 ML/MIN/{1.73_M2}
GLUCOSE SERPL-MCNC: 90 MG/DL (ref 70–99)
HCT VFR BLD AUTO: 45.3 % (ref 40.5–52.5)
HGB BLD-MCNC: 15.7 G/DL (ref 13.5–17.5)
IMM GRANULOCYTES # BLD: 0 K/UL (ref 0–0.2)
IMM GRANULOCYTES NFR BLD: 0.2 %
LYMPHOCYTES # BLD: 1.2 K/UL (ref 1–5.1)
LYMPHOCYTES NFR BLD: 21.9 %
MCH RBC QN AUTO: 30.3 PG (ref 26–34)
MCHC RBC AUTO-ENTMCNC: 34.7 G/DL (ref 32–36.4)
MCV RBC AUTO: 87.5 FL (ref 80–100)
MONOCYTES # BLD: 0.6 K/UL (ref 0–1.3)
MONOCYTES NFR BLD: 10.9 %
NEUTROPHILS # BLD: 3.7 K/UL (ref 1.7–7.7)
NEUTROPHILS NFR BLD: 64.6 %
PLATELET # BLD AUTO: 237 K/UL (ref 135–450)
PMV BLD AUTO: 9.2 FL (ref 5–10.5)
POTASSIUM SERPL-SCNC: 4 MMOL/L (ref 3.5–5.1)
PROT SERPL-MCNC: 7.3 G/DL (ref 6.4–8.2)
RBC # BLD AUTO: 5.18 M/UL (ref 4.2–5.9)
SODIUM SERPL-SCNC: 141 MMOL/L (ref 136–145)
TROPONIN, HIGH SENSITIVITY: <6 NG/L (ref 0–22)
TROPONIN, HIGH SENSITIVITY: <6 NG/L (ref 0–22)
WBC # BLD AUTO: 5.7 K/UL (ref 4–11)

## 2023-06-28 PROCEDURE — 99285 EMERGENCY DEPT VISIT HI MDM: CPT

## 2023-06-28 PROCEDURE — 85025 COMPLETE CBC W/AUTO DIFF WBC: CPT

## 2023-06-28 PROCEDURE — 71046 X-RAY EXAM CHEST 2 VIEWS: CPT

## 2023-06-28 PROCEDURE — 36415 COLL VENOUS BLD VENIPUNCTURE: CPT

## 2023-06-28 PROCEDURE — 93010 ELECTROCARDIOGRAM REPORT: CPT | Performed by: INTERNAL MEDICINE

## 2023-06-28 PROCEDURE — 80053 COMPREHEN METABOLIC PANEL: CPT

## 2023-06-28 PROCEDURE — 93005 ELECTROCARDIOGRAM TRACING: CPT | Performed by: EMERGENCY MEDICINE

## 2023-06-28 PROCEDURE — 84484 ASSAY OF TROPONIN QUANT: CPT

## 2023-06-28 RX ORDER — HYDROXYZINE PAMOATE 25 MG/1
25-50 CAPSULE ORAL 3 TIMES DAILY PRN
Qty: 30 CAPSULE | Refills: 0 | Status: SHIPPED | OUTPATIENT
Start: 2023-06-28 | End: 2023-07-12

## 2023-06-28 ASSESSMENT — PAIN - FUNCTIONAL ASSESSMENT
PAIN_FUNCTIONAL_ASSESSMENT: 0-10

## 2023-06-28 ASSESSMENT — PAIN SCALES - GENERAL
PAINLEVEL_OUTOF10: 0
PAINLEVEL_OUTOF10: 0
PAINLEVEL_OUTOF10: 3
PAINLEVEL_OUTOF10: 0

## 2023-06-28 ASSESSMENT — PAIN DESCRIPTION - FREQUENCY: FREQUENCY: CONTINUOUS

## 2023-06-28 ASSESSMENT — PAIN DESCRIPTION - PAIN TYPE: TYPE: ACUTE PAIN

## 2023-06-28 ASSESSMENT — PAIN DESCRIPTION - DESCRIPTORS: DESCRIPTORS: PRESSURE

## 2023-06-28 ASSESSMENT — HEART SCORE: ECG: 0

## 2023-06-28 ASSESSMENT — PAIN DESCRIPTION - LOCATION: LOCATION: CHEST

## 2023-08-02 ENCOUNTER — OFFICE VISIT (OUTPATIENT)
Dept: ENT CLINIC | Age: 28
End: 2023-08-02
Payer: COMMERCIAL

## 2023-08-02 VITALS
SYSTOLIC BLOOD PRESSURE: 135 MMHG | DIASTOLIC BLOOD PRESSURE: 85 MMHG | HEIGHT: 69 IN | WEIGHT: 175 LBS | BODY MASS INDEX: 25.92 KG/M2 | HEART RATE: 84 BPM

## 2023-08-02 DIAGNOSIS — K21.9 LARYNGOPHARYNGEAL REFLUX (LPR): ICD-10-CM

## 2023-08-02 DIAGNOSIS — R09.89 GLOBUS SENSATION: Primary | ICD-10-CM

## 2023-08-02 DIAGNOSIS — J35.01 CHRONIC TONSILLITIS: ICD-10-CM

## 2023-08-02 PROCEDURE — 99214 OFFICE O/P EST MOD 30 MIN: CPT | Performed by: OTOLARYNGOLOGY

## 2023-08-02 NOTE — PROGRESS NOTES
[address] [facility] HEAD & NECK SURGERY  Follow up      Patient Name: Jennifer Dumont Record Number:  [id number]  Primary Care Physician:  No primary care provider on file. Date of Consultation: 8/2/2023    Chief Complaint: Globus        Interval History    Patient is following up for his globus sensation. I last saw him on June 9, 2023. I switched him to omeprazole as he did not think Protonix was helping anymore. He said that he thinks the omeprazole extremity things worse. He still having occasional sore throats as well. Sometimes feels like his postnasal drip, but no anterior rhinorrhea. He has cut back on vaping, but does say he does it about once a week. REVIEW OF SYSTEMS      PHYSICAL EXAM  GENERAL: No Acute Distress, Alert and Oriented, no Hoarseness, strong voice  EYES: EOMI, Anti-icteric  HENT:   Head: Normocephalic and atraumatic. Face:  Symmetric, facial nerve intact, no sinus tenderness  Right Ear: Normal external ear, normal external auditory canal, intact tympanic membrane with normal mobility and aerated middle ear  Left Ear: Normal external ear, normal external auditory canal, intact tympanic membrane with normal mobility and aerated middle ear  Mouth/Oral Cavity:  normal lips, Uvula is midline, no mucosal lesions, no trismus  Oropharynx/Larynx: Cryptic tonsils  Nose:Normal external nasal appearance. NECK: Normal range of motion, no thyromegaly, trachea is midline, no lymphadenopathy, no neck masses, no crepitus          ASSESSMENT/PLAN  1. Globus sensation  Patient still having a globus sensation. We tried a lot of different things including gastroenterology and speech therapy. He did respond somewhat to reflux medication, but more recently omeprazole seem to actually make things worse. He does have sore throats and somewhat cryptic tonsils and certainly this could play a role.   I told him that we could consider tonsillectomy and adenoidectomy,

## 2023-09-25 ENCOUNTER — NURSE TRIAGE (OUTPATIENT)
Dept: OTHER | Facility: CLINIC | Age: 28
End: 2023-09-25

## 2023-09-25 NOTE — TELEPHONE ENCOUNTER
Location of patient: [place]    Received call from [facility] at [facility]; Patient with Red Flag Complaint requesting to establish care with [facility]. Subjective: Caller states \"Abdominal pain\"     Current Symptoms:   Intermittent bright red rectal bleeding for \"a while, greater than 6 months\" only with wiping. Constipation with bloating  Generalized abdominal pain that has become more predominant in the RLQ. Denies N/V    Onset: 3 days ago; worsening    Pain Severity: 2/10; squeezing; intermittent    Temperature: Denies    What has been tried: Tums, Metamucil    Recommended disposition: See in Office Today or Tomorrow  Advised UCC/ER if no available appts. Care advice provided, patient verbalizes understanding; denies any other questions or concerns; instructed to call back for any new or worsening symptoms. Patient/Caller agrees with recommended disposition; writer provided warm transfer to [place] at [facility] for appointment scheduling    Attention Provider: Thank you for allowing me to participate in the care of your patient. The patient was connected to triage in response to information provided to the [facility]. Please do not respond through this encounter as the response is not directed to a shared pool. Reason for Disposition   MILD pain (e.g., does not interfere with normal activities) and pain comes and goes (cramps) lasts > 48 hours  (Exception:  This same abdominal pain is a chronic symptom recurrent or ongoing AND present > 4 weeks.)    Protocols used: Abdominal Pain - Male-ADULT-[place]

## 2023-09-26 ENCOUNTER — OFFICE VISIT (OUTPATIENT)
Dept: FAMILY MEDICINE CLINIC | Age: 28
End: 2023-09-26
Payer: COMMERCIAL

## 2023-09-26 ENCOUNTER — TELEPHONE (OUTPATIENT)
Dept: FAMILY MEDICINE CLINIC | Age: 28
End: 2023-09-26

## 2023-09-26 ENCOUNTER — HOSPITAL ENCOUNTER (OUTPATIENT)
Dept: CT IMAGING | Age: 28
Discharge: HOME | End: 2023-09-26
Payer: COMMERCIAL

## 2023-09-26 VITALS
OXYGEN SATURATION: 99 % | HEIGHT: 69 IN | BODY MASS INDEX: 26.63 KG/M2 | WEIGHT: 179.8 LBS | SYSTOLIC BLOOD PRESSURE: 120 MMHG | DIASTOLIC BLOOD PRESSURE: 80 MMHG | HEART RATE: 85 BPM

## 2023-09-26 DIAGNOSIS — R10.30 LOWER ABDOMINAL PAIN: Primary | ICD-10-CM

## 2023-09-26 DIAGNOSIS — R10.30 LOWER ABDOMINAL PAIN: ICD-10-CM

## 2023-09-26 DIAGNOSIS — Z00.00 WELL ADULT EXAM: ICD-10-CM

## 2023-09-26 DIAGNOSIS — Z76.89 ENCOUNTER TO ESTABLISH CARE: Primary | ICD-10-CM

## 2023-09-26 PROCEDURE — 1036F TOBACCO NON-USER: CPT | Performed by: NURSE PRACTITIONER

## 2023-09-26 PROCEDURE — 6360000004 HC RX CONTRAST MEDICATION: Performed by: NURSE PRACTITIONER

## 2023-09-26 PROCEDURE — G8427 DOCREV CUR MEDS BY ELIG CLIN: HCPCS | Performed by: NURSE PRACTITIONER

## 2023-09-26 PROCEDURE — 74177 CT ABD & PELVIS W/CONTRAST: CPT

## 2023-09-26 PROCEDURE — G8419 CALC BMI OUT NRM PARAM NOF/U: HCPCS | Performed by: NURSE PRACTITIONER

## 2023-09-26 PROCEDURE — 99204 OFFICE O/P NEW MOD 45 MIN: CPT | Performed by: NURSE PRACTITIONER

## 2023-09-26 RX ORDER — DOCUSATE SODIUM 100 MG/1
100 CAPSULE, LIQUID FILLED ORAL 2 TIMES DAILY
Qty: 60 CAPSULE | Refills: 0 | Status: SHIPPED | OUTPATIENT
Start: 2023-09-26 | End: 2023-10-26

## 2023-09-26 RX ORDER — POLYETHYLENE GLYCOL 3350 17 G/17G
17 POWDER, FOR SOLUTION ORAL DAILY PRN
Qty: 510 G | Refills: 0 | Status: CANCELLED | OUTPATIENT
Start: 2023-09-26 | End: 2023-10-26

## 2023-09-26 RX ADMIN — IOMEPROL INJECTION 100 ML: 714 INJECTION, SOLUTION INTRAVASCULAR at 12:35

## 2023-09-26 SDOH — HEALTH STABILITY: PHYSICAL HEALTH: ON AVERAGE, HOW MANY MINUTES DO YOU ENGAGE IN EXERCISE AT THIS LEVEL?: 40 MIN

## 2023-09-26 SDOH — HEALTH STABILITY: PHYSICAL HEALTH: ON AVERAGE, HOW MANY DAYS PER WEEK DO YOU ENGAGE IN MODERATE TO STRENUOUS EXERCISE (LIKE A BRISK WALK)?: 5 DAYS

## 2023-09-26 ASSESSMENT — ENCOUNTER SYMPTOMS
NAUSEA: 0
RHINORRHEA: 0
SORE THROAT: 0
CONSTIPATION: 1
ABDOMINAL PAIN: 1
COLOR CHANGE: 0
DIARRHEA: 0
BACK PAIN: 0
TROUBLE SWALLOWING: 0
SINUS PAIN: 0
RESPIRATORY NEGATIVE: 1
ALLERGIC/IMMUNOLOGIC NEGATIVE: 1
RECTAL PAIN: 1
EYES NEGATIVE: 1
SINUS PRESSURE: 0
VOMITING: 0

## 2023-09-26 ASSESSMENT — SOCIAL DETERMINANTS OF HEALTH (SDOH)

## 2023-09-26 ASSESSMENT — PATIENT HEALTH QUESTIONNAIRE - PHQ9
2. FEELING DOWN, DEPRESSED OR HOPELESS: 0
SUM OF ALL RESPONSES TO PHQ QUESTIONS 1-9: 0
SUM OF ALL RESPONSES TO PHQ QUESTIONS 1-9: 0
SUM OF ALL RESPONSES TO PHQ9 QUESTIONS 1 & 2: 0
DEPRESSION UNABLE TO ASSESS: PT REFUSES
SUM OF ALL RESPONSES TO PHQ QUESTIONS 1-9: 0
1. LITTLE INTEREST OR PLEASURE IN DOING THINGS: 0
SUM OF ALL RESPONSES TO PHQ QUESTIONS 1-9: 0

## 2023-09-26 NOTE — TELEPHONE ENCOUNTER
Christy Everett with central scheduling pt needs a new order for a ct scan of abdomin and pelvic with IV contrast. Just needs the W not WWO.

## 2023-09-26 NOTE — PROGRESS NOTES
History and Physical      Prudence Yee  YOB: 1995    Date of Service:  9/26/2023    Chief Complaint:   Prudence Yee is a 34 y.o. male who presents for complete physical examination. Chief Complaint   Patient presents with    Abdominal Pain     Been going on since Friday (4 days). Constricting pain that comes and goes, pressure sensitive    Rectal Bleeding     Off and on over the past 6months, became consistent Thursday (5days). Bright red, only seen on toilet paper, only occurs with bowel movements. Rectal pain after BM, inconsistent itching        HPI:    New patient visit    PMH includes adhd, GERD, allergies. States his main concern today is rectal bleeding. States its been on and off for the last 6 months. Denies it interfering with his ADL's. States its an achy, cramping type of pain. States its in his lower abdomen but progresses in his mid abdomen as well. States the rectal bleeding is on and off and states its bright red blood when he wipes. States his last BM was this morning and it was hard. States she suffers with some constipation. Denies any pain when he has a BM. States some cramping when he has a BM. Denies any n/v or diarrhea. Denies fevers, aches or chills. Denies chest pain, sob, dizziness, headaches. States he does have occasional GERD which he takes Prilosec for and it helps.    States diet and exercise are good  States he takes atarax as needed for anxiety  Denies issues with sleep and depression  States he is engaged, works as a   States he used to [employer] and has quit over the last month  Denies drugs but occasional alcohol          Wt Readings from Last 3 Encounters:   09/26/23 179 lb 12.8 oz (81.6 kg)   08/02/23 175 lb (79.4 kg)   06/28/23 180 lb (81.6 kg)     BP Readings from Last 3 Encounters:   09/26/23 120/80   08/02/23 135/85   06/28/23 133/81       Patient Active Problem List   Diagnosis    Allergic rhinitis    Attention deficit hyperactivity disorder Today's INR 2.3  Lacie INR on 11/05/18 was 2.4    Patient is taking 4.5 mg on M, Th, Sat and 3 mg rest of the week.

## 2023-10-15 ENCOUNTER — APPOINTMENT (OUTPATIENT)
Dept: GENERAL RADIOLOGY | Age: 28
End: 2023-10-15
Payer: COMMERCIAL

## 2023-10-15 ENCOUNTER — HOSPITAL ENCOUNTER (EMERGENCY)
Age: 28
Discharge: HOME | End: 2023-10-15
Attending: EMERGENCY MEDICINE
Payer: COMMERCIAL

## 2023-10-15 VITALS
RESPIRATION RATE: 17 BRPM | BODY MASS INDEX: 26.84 KG/M2 | TEMPERATURE: 99.5 F | HEART RATE: 77 BPM | OXYGEN SATURATION: 100 % | WEIGHT: 181.22 LBS | DIASTOLIC BLOOD PRESSURE: 82 MMHG | SYSTOLIC BLOOD PRESSURE: 123 MMHG | HEIGHT: 69 IN

## 2023-10-15 DIAGNOSIS — R07.89 ATYPICAL CHEST PAIN: Primary | ICD-10-CM

## 2023-10-15 LAB
ALBUMIN SERPL-MCNC: 5 G/DL (ref 3.4–5)
ALBUMIN/GLOB SERPL: 1.8 {RATIO} (ref 1.1–2.2)
ALP SERPL-CCNC: 60 U/L (ref 40–129)
ALT SERPL-CCNC: 33 U/L (ref 10–40)
ANION GAP SERPL CALCULATED.3IONS-SCNC: 10 MMOL/L (ref 3–16)
AST SERPL-CCNC: 30 U/L (ref 15–37)
BASOPHILS # BLD: 0 K/UL (ref 0–0.2)
BASOPHILS NFR BLD: 0.6 %
BILIRUB SERPL-MCNC: 0.7 MG/DL (ref 0–1)
BUN SERPL-MCNC: 16 MG/DL (ref 7–20)
CALCIUM SERPL-MCNC: 10.1 MG/DL (ref 8.3–10.6)
CHLORIDE SERPL-SCNC: 103 MMOL/L (ref 99–110)
CO2 SERPL-SCNC: 29 MMOL/L (ref 21–32)
CREAT SERPL-MCNC: 1.1 MG/DL (ref 0.9–1.3)
DEPRECATED RDW RBC AUTO: 12.8 % (ref 12.4–15.4)
EOSINOPHIL # BLD: 0.2 K/UL (ref 0–0.6)
EOSINOPHIL NFR BLD: 2.6 %
GFR SERPLBLD CREATININE-BSD FMLA CKD-EPI: >60 ML/MIN/{1.73_M2}
GLUCOSE SERPL-MCNC: 90 MG/DL (ref 70–99)
HCT VFR BLD AUTO: 47.3 % (ref 40.5–52.5)
HGB BLD-MCNC: 16.1 G/DL (ref 13.5–17.5)
IMM GRANULOCYTES # BLD: 0 K/UL (ref 0–0.2)
IMM GRANULOCYTES NFR BLD: 0 %
LYMPHOCYTES # BLD: 1.6 K/UL (ref 1–5.1)
LYMPHOCYTES NFR BLD: 24 %
MCH RBC QN AUTO: 30.1 PG (ref 26–34)
MCHC RBC AUTO-ENTMCNC: 34 G/DL (ref 32–36.4)
MCV RBC AUTO: 88.6 FL (ref 80–100)
MONOCYTES # BLD: 0.7 K/UL (ref 0–1.3)
MONOCYTES NFR BLD: 10.9 %
NEUTROPHILS # BLD: 4.1 K/UL (ref 1.7–7.7)
NEUTROPHILS NFR BLD: 61.9 %
PLATELET # BLD AUTO: 266 K/UL (ref 135–450)
PMV BLD AUTO: 9.7 FL (ref 5–10.5)
POTASSIUM SERPL-SCNC: 4.5 MMOL/L (ref 3.5–5.1)
PROT SERPL-MCNC: 7.8 G/DL (ref 6.4–8.2)
RBC # BLD AUTO: 5.34 M/UL (ref 4.2–5.9)
SODIUM SERPL-SCNC: 142 MMOL/L (ref 136–145)
TROPONIN, HIGH SENSITIVITY: <6 NG/L (ref 0–22)
TROPONIN, HIGH SENSITIVITY: <6 NG/L (ref 0–22)
WBC # BLD AUTO: 6.6 K/UL (ref 4–11)

## 2023-10-15 PROCEDURE — 84484 ASSAY OF TROPONIN QUANT: CPT

## 2023-10-15 PROCEDURE — 36415 COLL VENOUS BLD VENIPUNCTURE: CPT

## 2023-10-15 PROCEDURE — 71046 X-RAY EXAM CHEST 2 VIEWS: CPT

## 2023-10-15 PROCEDURE — 80053 COMPREHEN METABOLIC PANEL: CPT

## 2023-10-15 PROCEDURE — 85025 COMPLETE CBC W/AUTO DIFF WBC: CPT

## 2023-10-15 PROCEDURE — 99285 EMERGENCY DEPT VISIT HI MDM: CPT

## 2023-10-15 PROCEDURE — 93005 ELECTROCARDIOGRAM TRACING: CPT | Performed by: EMERGENCY MEDICINE

## 2023-10-15 RX ORDER — OMEPRAZOLE 40 MG/1
40 CAPSULE, DELAYED RELEASE ORAL
COMMUNITY
Start: 2023-09-26

## 2023-10-15 ASSESSMENT — PATIENT HEALTH QUESTIONNAIRE - PHQ9
SUM OF ALL RESPONSES TO PHQ QUESTIONS 1-9: 0
2. FEELING DOWN, DEPRESSED OR HOPELESS: 0
SUM OF ALL RESPONSES TO PHQ QUESTIONS 1-9: 0
1. LITTLE INTEREST OR PLEASURE IN DOING THINGS: 0
SUM OF ALL RESPONSES TO PHQ9 QUESTIONS 1 & 2: 0

## 2023-10-15 ASSESSMENT — PAIN - FUNCTIONAL ASSESSMENT: PAIN_FUNCTIONAL_ASSESSMENT: 0-10

## 2023-10-15 ASSESSMENT — LIFESTYLE VARIABLES
HOW MANY STANDARD DRINKS CONTAINING ALCOHOL DO YOU HAVE ON A TYPICAL DAY: PATIENT DOES NOT DRINK
HOW OFTEN DO YOU HAVE A DRINK CONTAINING ALCOHOL: NEVER

## 2023-10-15 ASSESSMENT — PAIN DESCRIPTION - ORIENTATION: ORIENTATION: LEFT

## 2023-10-15 ASSESSMENT — PAIN SCALES - GENERAL
PAINLEVEL_OUTOF10: 0
PAINLEVEL_OUTOF10: 4

## 2023-10-15 ASSESSMENT — PAIN DESCRIPTION - DESCRIPTORS: DESCRIPTORS: PRESSURE

## 2023-10-15 ASSESSMENT — PAIN DESCRIPTION - LOCATION: LOCATION: CHEST

## 2023-10-15 NOTE — ED PROVIDER NOTES
Emergency Department Provider Note  Location: [facility]  10/15/2023     Patient Identification  Khadar Irby is a 34 y.o. male    Chief Complaint  Chest Pain (Chest pain radiates to left chest and down left arm. Pain started 20 minutes ago.  )          HPI  (History provided by patient)  Patient is a 21-year-old male with history of GERD ADHD otherwise healthy who presents for evaluation after chest pain started just over an hour ago. Patient reports a pressure sensation left anterior chest that radiated to the left arm. Had pinching sensations\" in the shoulders but no other back pain no shortness of breath no pleurisy. Started when he was driving. Not associated with any diaphoresis nausea or lightheadedness. No numbness or weakness. Coming and going and now resolved by the time of arrival here. Patient has had the chest symptoms multiple times in the past has been into the ER a few months ago for the same exact symptoms but it did not radiate down the arm as much. Patient is non-smoker family history of coronary disease. I have reviewed the following nursing documentation:  Allergies: No Known Allergies    Past medical history:  has a past medical history of Allergic rhinitis, seasonal, Attention deficit hyperactivity disorder (ADHD), predominantly inattentive type (08/31/2018), Conjunctivitis, allergic, COVID-19, and GERD (gastroesophageal reflux disease). Past surgical history:  has a past surgical history that includes Nasal fracture surgery (07/14/2021) and Upper gastrointestinal endoscopy (N/A, 12/15/2022). Home medications:   Prior to Admission medications    Medication Sig Start Date End Date Taking?  Authorizing Provider   omeprazole (PRILOSEC) 40 MG delayed release capsule Take 1 capsule by mouth every morning (before breakfast) 9/26/23   Provider, MD Gonzalez   docusate sodium (COLACE) 100 MG capsule Take 1 capsule by mouth 2 times daily 9/26/23

## 2023-10-15 NOTE — ED TRIAGE NOTES
Patient came to ER with complaints of left sided chest pressure. Patient stated pain started 20 minutes ago. Pain then radiates down left arm. No nausea or vomiting.

## 2023-10-15 NOTE — ED NOTES
Md Motley into see pt. Call light in reach/ updated on plan of care.      Kali Duke RN  10/15/23 1925

## 2023-10-16 ENCOUNTER — OFFICE VISIT (OUTPATIENT)
Dept: CARDIOLOGY CLINIC | Age: 28
End: 2023-10-16
Payer: COMMERCIAL

## 2023-10-16 VITALS
HEIGHT: 69 IN | DIASTOLIC BLOOD PRESSURE: 76 MMHG | HEART RATE: 76 BPM | WEIGHT: 180 LBS | BODY MASS INDEX: 26.66 KG/M2 | OXYGEN SATURATION: 98 % | SYSTOLIC BLOOD PRESSURE: 124 MMHG

## 2023-10-16 DIAGNOSIS — R07.9 CHEST PAIN IN ADULT: Primary | ICD-10-CM

## 2023-10-16 LAB
EKG ATRIAL RATE: 93 BPM
EKG DIAGNOSIS: NORMAL
EKG P AXIS: 62 DEGREES
EKG P-R INTERVAL: 138 MS
EKG Q-T INTERVAL: 346 MS
EKG QRS DURATION: 94 MS
EKG QTC CALCULATION (BAZETT): 430 MS
EKG R AXIS: 71 DEGREES
EKG T AXIS: 44 DEGREES
EKG VENTRICULAR RATE: 93 BPM

## 2023-10-16 PROCEDURE — G8427 DOCREV CUR MEDS BY ELIG CLIN: HCPCS | Performed by: INTERNAL MEDICINE

## 2023-10-16 PROCEDURE — 1036F TOBACCO NON-USER: CPT | Performed by: INTERNAL MEDICINE

## 2023-10-16 PROCEDURE — G8419 CALC BMI OUT NRM PARAM NOF/U: HCPCS | Performed by: INTERNAL MEDICINE

## 2023-10-16 PROCEDURE — 99204 OFFICE O/P NEW MOD 45 MIN: CPT | Performed by: INTERNAL MEDICINE

## 2023-10-16 PROCEDURE — G8484 FLU IMMUNIZE NO ADMIN: HCPCS | Performed by: INTERNAL MEDICINE

## 2023-10-16 PROCEDURE — 93000 ELECTROCARDIOGRAM COMPLETE: CPT | Performed by: INTERNAL MEDICINE

## 2023-10-16 PROCEDURE — 93010 ELECTROCARDIOGRAM REPORT: CPT | Performed by: INTERNAL MEDICINE

## 2023-10-16 NOTE — PROGRESS NOTES
Referring Physician: * No referring provider recorded for this case *  Reason for Consultation: Chest pain  Chief Complaint: I am having chest pain      Subjective:   History of Present Illness:  Lizy Oliver is a 34 y.o. pleasant male patient with prior history of ADHD, gastroesophageal reflux disease conjunctivitis who presented to our office with complaints of recurrent chest pressure. Patient states he gets pressure in the chest randomly which radiates to the left side of his shoulder. He denies any palpitation or shortness of breath. He also denies any lightheadedness or syncope. He presented to [facility] on 10/15/2023. His cardiac work-up was negative and he was asked to come for a cardiac evaluation. He denies any dyspnea on exertion, orthopnea, PND, heart palpitation or leg edema    I have been asked to provide consultation regarding further management and testing. Review of Systems:   All 12 point review of symptoms completed. Pertinent positives identified in the HPI, all other review of symptoms negative as below. Past Medical History:   has a past medical history of Allergic rhinitis, seasonal, Attention deficit hyperactivity disorder (ADHD), predominantly inattentive type, Conjunctivitis, allergic, COVID-19, and GERD (gastroesophageal reflux disease). Surgical History:   has a past surgical history that includes Nasal fracture surgery (07/14/2021) and Upper gastrointestinal endoscopy (N/A, 12/15/2022). Social History:   reports that he has never smoked. He has never used smokeless tobacco. He reports current alcohol use. He reports that he does not use drugs. Family History:  family history includes Heart Disease in his father; Hypertension in his father; No Known Problems in his mother. Home Medications:  Were reviewed and are listed in nursing record and/or below  Prior to Admission medications    Medication Sig Start Date End Date Taking?  Authorizing

## 2023-10-16 NOTE — ED NOTES
Md Motley into discuss discharge and f/u instructions w pt /family.       Geno Landers RN  10/15/23 2001

## 2023-10-20 ENCOUNTER — OFFICE VISIT (OUTPATIENT)
Dept: FAMILY MEDICINE CLINIC | Age: 28
End: 2023-10-20
Payer: COMMERCIAL

## 2023-10-20 VITALS
DIASTOLIC BLOOD PRESSURE: 76 MMHG | OXYGEN SATURATION: 98 % | BODY MASS INDEX: 26.51 KG/M2 | WEIGHT: 179 LBS | HEIGHT: 69 IN | SYSTOLIC BLOOD PRESSURE: 126 MMHG | HEART RATE: 81 BPM

## 2023-10-20 DIAGNOSIS — M54.9 MID BACK PAIN: Primary | ICD-10-CM

## 2023-10-20 PROCEDURE — 99214 OFFICE O/P EST MOD 30 MIN: CPT | Performed by: NURSE PRACTITIONER

## 2023-10-20 PROCEDURE — 1036F TOBACCO NON-USER: CPT | Performed by: NURSE PRACTITIONER

## 2023-10-20 PROCEDURE — G8484 FLU IMMUNIZE NO ADMIN: HCPCS | Performed by: NURSE PRACTITIONER

## 2023-10-20 PROCEDURE — G8419 CALC BMI OUT NRM PARAM NOF/U: HCPCS | Performed by: NURSE PRACTITIONER

## 2023-10-20 PROCEDURE — G8427 DOCREV CUR MEDS BY ELIG CLIN: HCPCS | Performed by: NURSE PRACTITIONER

## 2023-10-20 RX ORDER — TIZANIDINE 4 MG/1
4 TABLET ORAL 3 TIMES DAILY
Qty: 21 TABLET | Refills: 0 | Status: SHIPPED | OUTPATIENT
Start: 2023-10-20 | End: 2023-10-27

## 2023-10-20 RX ORDER — PREDNISONE 20 MG/1
40 TABLET ORAL DAILY
Qty: 20 TABLET | Refills: 0 | Status: SHIPPED | OUTPATIENT
Start: 2023-10-20 | End: 2023-10-30

## 2023-10-20 ASSESSMENT — PATIENT HEALTH QUESTIONNAIRE - PHQ9
SUM OF ALL RESPONSES TO PHQ QUESTIONS 1-9: 0
SUM OF ALL RESPONSES TO PHQ9 QUESTIONS 1 & 2: 0
2. FEELING DOWN, DEPRESSED OR HOPELESS: 0
1. LITTLE INTEREST OR PLEASURE IN DOING THINGS: 0
SUM OF ALL RESPONSES TO PHQ QUESTIONS 1-9: 0

## 2023-10-20 NOTE — PROGRESS NOTES
Desire Rahman (:  1995) is a 34 y.o. male,Established patient, here for evaluation of the following chief complaint(s):  Follow-Up from Hospital (Pt went to ER for chest pressure and SOB. Went to ER . Pt states he is still having the chest pressure and sob. )         ASSESSMENT/PLAN:  1. Mid back pain  -     predniSONE (DELTASONE) 20 MG tablet; Take 2 tablets by mouth daily for 10 days, Disp-20 tablet, R-0Normal  -     XR THORACIC SPINE (MIN 4 VIEWS); Future  -     tiZANidine (ZANAFLEX) 4 MG tablet; Take 1 tablet by mouth 3 times daily for 7 days, Disp-21 tablet, R-0Normal  - continue to follow up with stress test  - want to rule out possible injury to the Tspine. Will call with xray results. Return if symptoms worsen or fail to improve. Subjective   SUBJECTIVE/OBJECTIVE:  HPI    Patient presents today for chest pain that has been ongoing over the last few weeks. States he was seen in the ER and ekg came back normal, labs normal. States was referred to cardiology who did another ekg, echo and all was normal. Schedule to have stress test done. States though this has been going on for 2 months and started after working out one day - lifting and the pain was originally in his mid back area. States since the pain has gone around the rib cage of both sides. Denies any stress and anxiety that he knows of. Denies any blurred vision or numbness. Denies and headaches. States some sob when the pain is unbearable. Review of Systems   See HPI    Objective   Physical Exam  Vitals and nursing note reviewed. Cardiovascular:      Rate and Rhythm: Normal rate and regular rhythm. Pulmonary:      Effort: Pulmonary effort is normal.      Breath sounds: Normal breath sounds. Musculoskeletal:         General: Normal range of motion. Skin:     General: Skin is warm and dry. Neurological:      General: No focal deficit present. Mental Status: He is oriented to person, place, and time.

## 2023-10-21 ENCOUNTER — HOSPITAL ENCOUNTER (OUTPATIENT)
Age: 28
Discharge: HOME | End: 2023-10-21
Payer: COMMERCIAL

## 2023-10-21 ENCOUNTER — HOSPITAL ENCOUNTER (OUTPATIENT)
Dept: GENERAL RADIOLOGY | Age: 28
Discharge: HOME | End: 2023-10-21
Payer: COMMERCIAL

## 2023-10-21 DIAGNOSIS — M54.9 MID BACK PAIN: ICD-10-CM

## 2023-10-21 PROCEDURE — 72072 X-RAY EXAM THORAC SPINE 3VWS: CPT

## 2023-10-23 ENCOUNTER — TELEPHONE (OUTPATIENT)
Dept: FAMILY MEDICINE CLINIC | Age: 28
End: 2023-10-23

## 2023-10-23 DIAGNOSIS — M54.6 ACUTE BILATERAL THORACIC BACK PAIN: Primary | ICD-10-CM

## 2023-10-23 NOTE — TELEPHONE ENCOUNTER
----- Message from Carlitos Pringle sent at 10/23/2023  9:00 AM EDT -----  Subject: Message to Provider    QUESTIONS  Information for Provider? Patient is needing a call from Ayaka Gee   today if possible, please advise.  ---------------------------------------------------------------------------  --------------  [address]  [phone]; OK to leave message on voicemail  ---------------------------------------------------------------------------  --------------  SCRIPT ANSWERS  Relationship to Patient?  Self

## 2023-10-24 ENCOUNTER — TELEMEDICINE (OUTPATIENT)
Dept: FAMILY MEDICINE CLINIC | Age: 28
End: 2023-10-24
Payer: COMMERCIAL

## 2023-10-24 DIAGNOSIS — F41.9 ANXIETY: Primary | ICD-10-CM

## 2023-10-24 PROCEDURE — 99422 OL DIG E/M SVC 11-20 MIN: CPT | Performed by: NURSE PRACTITIONER

## 2023-10-24 RX ORDER — FLUOXETINE HYDROCHLORIDE 20 MG/1
20 CAPSULE ORAL DAILY
Qty: 30 CAPSULE | Refills: 3 | Status: SHIPPED | OUTPATIENT
Start: 2023-10-24

## 2023-10-24 NOTE — PROGRESS NOTES
Kwame Hammond, was evaluated through a synchronous (real-time) audio-video encounter. The patient (or guardian if applicable) is aware that this is a billable service, which includes applicable co-pays. This Virtual Visit was conducted with patient's (and/or legal guardian's) consent. Patient identification was verified, and a caregiver was present when appropriate. The patient was located at Home: [email or web address] [facility] [place] [address]  Provider was located at [facility] (Appt Dept): [address] [facility], [address] [facility],  [address] [facility]      Kwame Hammond (:  1995) is a Established patient, presenting virtually for evaluation of the following: anxiety    Assessment & Plan   Below is the assessment and plan developed based on review of pertinent history, physical exam, labs, studies, and medications. 1. Anxiety  -     FLUoxetine (PROZAC) 20 MG capsule; Take 1 capsule by mouth daily, Disp-30 capsule, R-3Normal  Return in about 4 weeks (around 2023). Subjective   HPI    Patient presents today for VV for stress and anxiety. Has had chest pains on and off for over a month and attributed to possible cardiac issue. States did full cardiac work up with labs and imaging and all was negative. States he is busy with work and has a daily stress but did not realize the chest pain could be attributed to that. Denies any changes in sleep, appetite, exercise. Denies SI or thoughts of hurting others. Denies any depression, panic attacks.      Review of Systems     See HPI    Objective   Patient-Reported Vitals  No data recorded     Physical Exam  [INSTRUCTIONS:  \"[x]\" Indicates a positive item  \"[]\" Indicates a negative item  -- DELETE ALL ITEMS NOT EXAMINED]    Constitutional: [x] Appears well-developed and well-nourished [x] No apparent distress      [] Abnormal -     Mental status: [x] Alert and awake  [x] Oriented to person/place/time [x] Able to follow commands    [] Abnormal -     Eyes:   EOM    [x]

## 2023-10-30 ENCOUNTER — PATIENT MESSAGE (OUTPATIENT)
Dept: FAMILY MEDICINE CLINIC | Age: 28
End: 2023-10-30

## 2023-10-31 RX ORDER — MONTELUKAST SODIUM 10 MG/1
10 TABLET ORAL DAILY
Qty: 30 TABLET | Refills: 5 | OUTPATIENT
Start: 2023-10-31

## 2023-10-31 RX ORDER — MONTELUKAST SODIUM 10 MG/1
10 TABLET ORAL DAILY
Qty: 30 TABLET | Refills: 5 | Status: SHIPPED | OUTPATIENT
Start: 2023-10-31

## 2023-10-31 RX ORDER — FLUTICASONE PROPIONATE 50 MCG
2 SPRAY, SUSPENSION (ML) NASAL DAILY
Qty: 16 G | Refills: 0 | Status: SHIPPED | OUTPATIENT
Start: 2023-10-31

## 2023-10-31 NOTE — TELEPHONE ENCOUNTER
Called/spoke to [address] [place] [address] Road to get clarification on med auth. Auth is for refill request. They did verify that pt still had 6 refills on file that were still good that were sent by Dr. Lore Pérez and that they can get refill ready for him and will call him when it is ready. When refills are  they will call our office to request more. Called/spoke to pt, informed that pharmacy will get refill prepared for him and that future refills will come to our office once refills from Dr. Lore Pérez  expires.  V/u.

## 2023-10-31 NOTE — TELEPHONE ENCOUNTER
Shay Lechuga, [address] 10/30/2023 4:50 PM EDT      ----- Message -----  From: Shameka Gunter"  Sent: 10/30/2023 4:44 PM EDT  To: [facility]  Practice Staff  Subject: Prescription refill     Paul said I need approval for the Montelukast or singular, can I get that approved? Also the nasal spray that I have to get through you as well for allergies?  Thanks

## 2023-11-07 DIAGNOSIS — R10.30 LOWER ABDOMINAL PAIN: ICD-10-CM

## 2023-11-07 RX ORDER — DOCUSATE SODIUM 100 MG/1
100 CAPSULE, LIQUID FILLED ORAL 2 TIMES DAILY
Qty: 60 CAPSULE | Refills: 0 | Status: SHIPPED | OUTPATIENT
Start: 2023-11-07 | End: 2023-12-07

## 2023-11-22 ENCOUNTER — OFFICE VISIT (OUTPATIENT)
Dept: FAMILY MEDICINE CLINIC | Age: 28
End: 2023-11-22
Payer: COMMERCIAL

## 2023-11-22 VITALS
SYSTOLIC BLOOD PRESSURE: 132 MMHG | OXYGEN SATURATION: 97 % | HEART RATE: 78 BPM | BODY MASS INDEX: 26.69 KG/M2 | WEIGHT: 180.2 LBS | DIASTOLIC BLOOD PRESSURE: 76 MMHG | HEIGHT: 69 IN

## 2023-11-22 DIAGNOSIS — J02.9 ACUTE PHARYNGITIS, UNSPECIFIED ETIOLOGY: Primary | ICD-10-CM

## 2023-11-22 LAB — S PYO AG THROAT QL: NORMAL

## 2023-11-22 PROCEDURE — G8427 DOCREV CUR MEDS BY ELIG CLIN: HCPCS | Performed by: NURSE PRACTITIONER

## 2023-11-22 PROCEDURE — 4004F PT TOBACCO SCREEN RCVD TLK: CPT | Performed by: NURSE PRACTITIONER

## 2023-11-22 PROCEDURE — G8419 CALC BMI OUT NRM PARAM NOF/U: HCPCS | Performed by: NURSE PRACTITIONER

## 2023-11-22 PROCEDURE — G8484 FLU IMMUNIZE NO ADMIN: HCPCS | Performed by: NURSE PRACTITIONER

## 2023-11-22 PROCEDURE — 99213 OFFICE O/P EST LOW 20 MIN: CPT | Performed by: NURSE PRACTITIONER

## 2023-11-22 PROCEDURE — 87880 STREP A ASSAY W/OPTIC: CPT | Performed by: NURSE PRACTITIONER

## 2023-11-22 RX ORDER — PREDNISONE 10 MG/1
10 TABLET ORAL 2 TIMES DAILY
Qty: 10 TABLET | Refills: 0 | Status: SHIPPED | OUTPATIENT
Start: 2023-11-22 | End: 2023-11-27

## 2023-11-22 RX ORDER — AZITHROMYCIN 250 MG/1
250 TABLET, FILM COATED ORAL SEE ADMIN INSTRUCTIONS
Qty: 6 TABLET | Refills: 0 | Status: SHIPPED | OUTPATIENT
Start: 2023-11-22 | End: 2023-11-27

## 2023-11-22 NOTE — PROGRESS NOTES
results and face to face with the patient discussing the diagnosis and importance of compliance with the treatment plan as well as documenting on the day of the visit. An electronic signature was used to authenticate this note.     --NADIYA Sifuentes - CNP

## 2023-11-24 LAB — BACTERIA THROAT AEROBE CULT: NORMAL

## 2023-11-27 RX ORDER — FLUTICASONE PROPIONATE 50 MCG
2 SPRAY, SUSPENSION (ML) NASAL DAILY
Qty: 16 G | Refills: 0 | OUTPATIENT
Start: 2023-11-27

## 2023-11-27 NOTE — TELEPHONE ENCOUNTER
Medication:   Requested Prescriptions     Pending Prescriptions Disp Refills    fluticasone (FLONASE) 50 MCG/ACT nasal spray [Pharmacy Med Name: FLUTICASONE 50MCG NASAL SP (120) RX] 16 g 0     Sig: SHAKE LIQUID AND USE 2 SPRAYS IN EACH NOSTRIL DAILY        Last Filled:  10/31/23    Last appt: 5/12/2023   Next appt: Visit date not found    Last OARRS:       7/16/2019    11:18 AM   RX Monitoring   Periodic Controlled Substance Monitoring No signs of potential drug abuse or diversion identified.

## 2023-11-28 ENCOUNTER — TELEPHONE (OUTPATIENT)
Dept: FAMILY MEDICINE CLINIC | Age: 28
End: 2023-11-28

## 2023-11-28 DIAGNOSIS — M54.9 MID BACK PAIN: ICD-10-CM

## 2023-11-28 RX ORDER — TIZANIDINE 4 MG/1
TABLET ORAL
Qty: 21 TABLET | Refills: 0 | OUTPATIENT
Start: 2023-11-28

## 2023-11-28 RX ORDER — CEFUROXIME AXETIL 500 MG/1
500 TABLET ORAL 2 TIMES DAILY
Qty: 20 TABLET | Refills: 0 | Status: SHIPPED | OUTPATIENT
Start: 2023-11-28 | End: 2023-12-08

## 2023-11-28 NOTE — TELEPHONE ENCOUNTER
Sent another antibiotic in for treatment - increase fluids and tylenol.  If the medication I sent does not work - this could be more viral No

## 2023-11-28 NOTE — TELEPHONE ENCOUNTER
Annalisa,    Please advise if you are Rx this medication for this pt. I do not see this medication on this pt's med list.

## 2023-11-28 NOTE — TELEPHONE ENCOUNTER
Pt called stating he still has a cough, slight fever, congestion in head, covid negative. Wanting to know if something new can be called in or if Pt needs to be seen again and if so when?  Please advise pt is reachable at [phone]

## 2023-11-28 NOTE — TELEPHONE ENCOUNTER
Medication:   Requested Prescriptions     Pending Prescriptions Disp Refills    tiZANidine (ZANAFLEX) 4 MG tablet [Pharmacy Med Name: TIZANIDINE 4MG TABLETS] 21 tablet 0     Sig: TAKE 1 TABLET BY MOUTH THREE TIMES DAILY FOR 7 DAYS        Last Filled:      Patient Phone Number: [phone] (home)     Last appt: 11/22/2023   Next appt: Visit date not found    Last OARRS:       7/16/2019    11:18 AM   RX Monitoring   Periodic Controlled Substance Monitoring No signs of potential drug abuse or diversion identified.

## 2024-01-08 ENCOUNTER — PATIENT MESSAGE (OUTPATIENT)
Dept: FAMILY MEDICINE CLINIC | Age: 29
End: 2024-01-08

## 2024-01-08 RX ORDER — DOCUSATE SODIUM 100 MG/1
100 CAPSULE, LIQUID FILLED ORAL 2 TIMES DAILY
Qty: 60 CAPSULE | Refills: 0 | Status: SHIPPED | OUTPATIENT
Start: 2024-01-08 | End: 2024-02-07

## 2024-01-08 NOTE — TELEPHONE ENCOUNTER
From: Jordan Tee  To: Annalisa Hankins  Sent: 1/8/2024 8:02 AM EST  Subject: Previous issues with bowel movements    It’s been a little while since I stopped taking the stool softeners. Twice in the last week to 2 weeks I’ve had blood during my bowel movement. You want to prescribe another round of stool softeners? They seem to work well when I’m taking them

## 2024-01-22 ENCOUNTER — PATIENT MESSAGE (OUTPATIENT)
Dept: FAMILY MEDICINE CLINIC | Age: 29
End: 2024-01-22

## 2024-01-22 RX ORDER — OMEPRAZOLE 40 MG/1
40 CAPSULE, DELAYED RELEASE ORAL
Qty: 90 CAPSULE | OUTPATIENT
Start: 2024-01-22

## 2024-01-22 RX ORDER — OMEPRAZOLE 40 MG/1
40 CAPSULE, DELAYED RELEASE ORAL
Qty: 30 CAPSULE | Refills: 1 | Status: SHIPPED | OUTPATIENT
Start: 2024-01-22

## 2024-01-22 NOTE — TELEPHONE ENCOUNTER
From: Jordan Tee  To: Annalisa Hankins  Sent: 1/22/2024 9:43 AM EST  Subject: OME    Paul said My Omeprozole cannot be refilled. Can you refill that for me and make it so I can continue to get it? Thanks

## 2024-01-25 DIAGNOSIS — F41.9 ANXIETY: ICD-10-CM

## 2024-01-25 NOTE — TELEPHONE ENCOUNTER
Medication:   Requested Prescriptions     Pending Prescriptions Disp Refills    FLUoxetine (PROZAC) 20 MG capsule [Pharmacy Med Name: FLUOXETINE 20MG CAPSULES] 30 capsule 3     Sig: TAKE 1 CAPSULE BY MOUTH DAILY        Last Filled:  10/24/2023    Patient Phone Number: [phone] (home)     Last appt: 11/22/2023   Next appt: Visit date not found    Last OARRS:       7/16/2019    11:18 AM   RX Monitoring   Periodic Controlled Substance Monitoring No signs of potential drug abuse or diversion identified.

## 2024-01-26 RX ORDER — FLUOXETINE HYDROCHLORIDE 20 MG/1
20 CAPSULE ORAL DAILY
Qty: 30 CAPSULE | Refills: 3 | Status: SHIPPED | OUTPATIENT
Start: 2024-01-26

## 2024-02-23 RX ORDER — OMEPRAZOLE 40 MG/1
40 CAPSULE, DELAYED RELEASE ORAL
Qty: 30 CAPSULE | Refills: 1 | Status: SHIPPED | OUTPATIENT
Start: 2024-02-23

## 2024-02-23 NOTE — TELEPHONE ENCOUNTER
Medication:   Requested Prescriptions     Pending Prescriptions Disp Refills    omeprazole (PRILOSEC) 40 MG delayed release capsule [Pharmacy Med Name: OMEPRAZOLE 40MG CAPSULES] 30 capsule 1     Sig: TAKE 1 CAPSULE BY MOUTH EVERY MORNING BEFORE BREAKFAST        Last Filled:  01/22/2024    Patient Phone Number: [phone] (home)     Last appt: 11/22/2023   Next appt: Visit date not found    Last OARRS:       7/16/2019    11:18 AM   RX Monitoring   Periodic Controlled Substance Monitoring No signs of potential drug abuse or diversion identified.

## 2024-02-26 ENCOUNTER — OFFICE VISIT (OUTPATIENT)
Dept: FAMILY MEDICINE CLINIC | Age: 29
End: 2024-02-26
Payer: COMMERCIAL

## 2024-02-26 VITALS
DIASTOLIC BLOOD PRESSURE: 72 MMHG | SYSTOLIC BLOOD PRESSURE: 122 MMHG | HEIGHT: 69 IN | BODY MASS INDEX: 27.13 KG/M2 | OXYGEN SATURATION: 99 % | HEART RATE: 85 BPM | WEIGHT: 183.2 LBS

## 2024-02-26 DIAGNOSIS — R06.02 SOB (SHORTNESS OF BREATH): Primary | ICD-10-CM

## 2024-02-26 DIAGNOSIS — M54.9 MID BACK PAIN: ICD-10-CM

## 2024-02-26 PROCEDURE — 99213 OFFICE O/P EST LOW 20 MIN: CPT | Performed by: NURSE PRACTITIONER

## 2024-02-26 RX ORDER — METHYLPREDNISOLONE 4 MG/1
TABLET ORAL
Qty: 1 KIT | Refills: 0 | Status: SHIPPED | OUTPATIENT
Start: 2024-02-26 | End: 2024-03-03

## 2024-02-26 RX ORDER — BUDESONIDE, GLYCOPYRROLATE, AND FORMOTEROL FUMARATE 160; 9; 4.8 UG/1; UG/1; UG/1
5.9 AEROSOL, METERED RESPIRATORY (INHALATION) AS NEEDED
Qty: 5.9 G | Refills: 0 | Status: SHIPPED | OUTPATIENT
Start: 2024-02-26 | End: 2024-02-26

## 2024-02-26 RX ORDER — BUDESONIDE, GLYCOPYRROLATE, AND FORMOTEROL FUMARATE 160; 9; 4.8 UG/1; UG/1; UG/1
5.9 AEROSOL, METERED RESPIRATORY (INHALATION) AS NEEDED
Qty: 5.9 G | Refills: 0 | Status: SHIPPED | COMMUNITY
Start: 2024-02-26

## 2024-02-26 SDOH — ECONOMIC STABILITY: INCOME INSECURITY: HOW HARD IS IT FOR YOU TO PAY FOR THE VERY BASICS LIKE FOOD, HOUSING, MEDICAL CARE, AND HEATING?: NOT HARD AT ALL

## 2024-02-26 SDOH — ECONOMIC STABILITY: FOOD INSECURITY: WITHIN THE PAST 12 MONTHS, THE FOOD YOU BOUGHT JUST DIDN'T LAST AND YOU DIDN'T HAVE MONEY TO GET MORE.: NEVER TRUE

## 2024-02-26 SDOH — ECONOMIC STABILITY: FOOD INSECURITY: WITHIN THE PAST 12 MONTHS, YOU WORRIED THAT YOUR FOOD WOULD RUN OUT BEFORE YOU GOT MONEY TO BUY MORE.: NEVER TRUE

## 2024-02-26 ASSESSMENT — PATIENT HEALTH QUESTIONNAIRE - PHQ9
2. FEELING DOWN, DEPRESSED OR HOPELESS: 0
SUM OF ALL RESPONSES TO PHQ QUESTIONS 1-9: 0
SUM OF ALL RESPONSES TO PHQ9 QUESTIONS 1 & 2: 0
1. LITTLE INTEREST OR PLEASURE IN DOING THINGS: 0
SUM OF ALL RESPONSES TO PHQ QUESTIONS 1-9: 0

## 2024-02-26 NOTE — PROGRESS NOTES
Jordan Tee (:  1995) is a 28 y.o. male,Established patient, here for evaluation of the following chief complaint(s):  Back Pain (Pt states he has already been here for the back pain and sob. Pt states he just still having those symptoms. ) and Shortness of Breath         ASSESSMENT/PLAN:  1. SOB (shortness of breath)  -     [facility], DO, Pulmonary, [place]  -     Budeson-Glycopyrrol-Formoterol (BREZTRI AEROSPHERE) 160-9-4.8 MCG/ACT AERO; Inhale 5.9 g into the lungs as needed (for the cough), Disp-5.9 g, R-0Sample   - want further evaluation with pulmonology   - instructed to use the inhaler for the cough and chest tightness  2. Mid back pain  -     [facility] - Clinic Consult  -     methylPREDNISolone (MEDROL DOSEPACK) 4 MG tablet; Take by mouth., Disp-1 kit, R-0Normal  - PT and exercises did not work - referring to spine specialist for further evaluation      No follow-ups on file.         Subjective   SUBJECTIVE/OBJECTIVE:  HPI    Patient presents today for mid back pain and sob. States its been on and off for a few months now. Denies much chest pain. States the pain in his back is in the middle and wraps around to the sides of his chest. States he has been taking the singulair daily with some relief for sob as needed. Denies headaches, weakness or fatigue. Denies anxiety - on the prozac - but denies it working or helping. Wants to wean off.     Review of Systems   See HPI    Objective   Physical Exam  Vitals and nursing note reviewed.   Constitutional:       Appearance: Normal appearance.   HENT:      Nose: No congestion or rhinorrhea.   Cardiovascular:      Rate and Rhythm: Normal rate and regular rhythm.   Pulmonary:      Effort: Pulmonary effort is normal.      Breath sounds: Normal breath sounds.   Musculoskeletal:         General: Tenderness present.      Thoracic back: Spasms and tenderness present. No swelling or signs of trauma. Normal range of

## 2024-02-29 ENCOUNTER — OFFICE VISIT (OUTPATIENT)
Dept: ORTHOPEDIC SURGERY | Age: 29
End: 2024-02-29
Payer: COMMERCIAL

## 2024-02-29 VITALS — RESPIRATION RATE: 18 BRPM | WEIGHT: 183 LBS | HEIGHT: 69 IN | BODY MASS INDEX: 27.11 KG/M2

## 2024-02-29 DIAGNOSIS — G89.29 CHRONIC MIDLINE THORACIC BACK PAIN: Primary | ICD-10-CM

## 2024-02-29 DIAGNOSIS — M54.6 CHRONIC MIDLINE THORACIC BACK PAIN: Primary | ICD-10-CM

## 2024-02-29 PROCEDURE — 99203 OFFICE O/P NEW LOW 30 MIN: CPT | Performed by: PHYSICIAN ASSISTANT

## 2024-02-29 RX ORDER — TIZANIDINE 4 MG/1
4 TABLET ORAL 4 TIMES DAILY PRN
Qty: 60 TABLET | Refills: 0 | Status: SHIPPED | OUTPATIENT
Start: 2024-02-29

## 2024-02-29 SDOH — HEALTH STABILITY: PHYSICAL HEALTH: ON AVERAGE, HOW MANY DAYS PER WEEK DO YOU ENGAGE IN MODERATE TO STRENUOUS EXERCISE (LIKE A BRISK WALK)?: 5 DAYS

## 2024-02-29 SDOH — HEALTH STABILITY: PHYSICAL HEALTH: ON AVERAGE, HOW MANY MINUTES DO YOU ENGAGE IN EXERCISE AT THIS LEVEL?: 30 MIN
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
## 2024-03-04 ENCOUNTER — HOSPITAL ENCOUNTER (OUTPATIENT)
Dept: PHYSICAL THERAPY | Age: 29
Setting detail: THERAPIES SERIES
Discharge: HOME | End: 2024-03-04
Payer: COMMERCIAL

## 2024-03-04 DIAGNOSIS — Z56.89 DIFFICULTY PERFORMING WORK ACTIVITIES: ICD-10-CM

## 2024-03-04 DIAGNOSIS — R68.89 DECREASED EXERCISE TOLERANCE: ICD-10-CM

## 2024-03-04 DIAGNOSIS — R68.89 DECREASED ABILITY TO PERFORM ACTIVITIES: ICD-10-CM

## 2024-03-04 DIAGNOSIS — R68.89 DECREASED ACTIVITY TOLERANCE: ICD-10-CM

## 2024-03-04 DIAGNOSIS — Z78.9 NEED FOR HOME EXERCISE PROGRAM: ICD-10-CM

## 2024-03-04 DIAGNOSIS — G47.9 DIFFICULTY SLEEPING: ICD-10-CM

## 2024-03-04 DIAGNOSIS — M25.60 LIMITED JOINT RANGE OF MOTION (ROM): ICD-10-CM

## 2024-03-04 DIAGNOSIS — R52 PAIN: Primary | ICD-10-CM

## 2024-03-04 PROCEDURE — 97110 THERAPEUTIC EXERCISES: CPT

## 2024-03-04 PROCEDURE — 97161 PT EVAL LOW COMPLEX 20 MIN: CPT

## 2024-03-04 PROCEDURE — 97140 MANUAL THERAPY 1/> REGIONS: CPT

## 2024-03-04 NOTE — FLOWSHEET NOTE
deficits.   Status: [] Progressing: [] Met: [] Not Met: [] Adjusted    Long Term Goals: To be achieved in:  5-10  weeks  Disability index score of 3% or less for the Modified Oswestry to assist with return top prior level of function.   Status: [] Progressing: [] Met: [] Not Met: [] Adjusted  Improve ROM to WNL to allow for proper joint functioning as indicated by patients functional deficits.  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        Status: [] Progressing: [] Met: [] Not Met: [] Adjusted  Patient will increase UE function to return to work with regular duties.             Status: [] Progressing: [] Met: [] Not Met: [] Adjusted    TREATMENT PLAN     Frequency/Duration: 1-2x/week for  4-8  weeks for the following treatment interventions:    Interventions:  [x] Therapeutic exercise including: strength training, ROM, including postural re-education.   [x] NMR activation and proprioception, including postural re-education.    [x] Manual therapy as indicated to include: PROM, Gr I-IV mobilizations, STM, Grade V Manipulation, and Dry Needling/IASTM  [x] Modalities as needed that may include: Cryotherapy and Electrical Stimulation  [x] Patient education on joint protection, postural re-education, activity modification, progression of HEP.        [] Aquatic Therapy    Plan: POC initiated as per evaluation    Electronically Signed by Mukesh Greene, PT  Date: 03/04/2024     Note: Portions of this note have been templated and/or copied from initial evaluation, reassessments and prior notes for

## 2024-03-04 NOTE — PLAN OF CARE
minutes; neuromuscular reeducation of movement, balance, coordination, kinesthetic sense, posture, and/or proprioception for sitting and/or standing activities  (45493) HOME EXERCISE PROGRAM - Reviewed/Progressed HEP activities related to neuromuscular reeducation of movement, balance, coordination, kinesthetic sense, posture, and/or proprioception for sitting and/or standing activities    (91233) THERAPEUTIC ACTIVITY - use of dynamic activities to improve functional performance. (Ex include squatting, ascending/descending stairs, walking, bending, lifting, catching, throwing, pushing, pulling, jumping.)  Direct, one on one contact, billed in 15-minute increments.  (06920) MANUAL THERAPY -  Manual therapy techniques, 1 or more regions, each 15 minutes (Mobilization/manipulation, manual lymphatic drainage, manual traction) for the purpose of modulating pain, promoting relaxation,  increasing ROM, reducing/eliminating soft tissue swelling/inflammation/restriction, improving soft tissue extensibility and allowing for proper ROM for normal function with self care, mobility, lifting and ambulation  (91385) Needle insertion(s) without injection; 1 or 2 muscle(s).  (17176) Needle insertion(s) without injection; 3 or more muscle(s)      GOALS     Patient stated goal: able to sit and lay supine w/o significant pain.   Status:  [] Progressing: [] Met: [] Not Met: [] Adjusted    Therapist goals for Patient:   Short Term Goals: To be achieved in: 2 weeks  Independent in HEP and progression per patient tolerance, in order to progress toward full function and prevent re-injury.    Status: [] Progressing: [] Met: [] Not Met: [] Adjusted  Patient will have a decrease in pain to 1/10 to help facilitate improvement in movement, function, and ADLs as indicated by functional deficits.   Status: [] Progressing: [] Met: [] Not Met: [] Adjusted    Long Term Goals: To be achieved in:  5-10  weeks  Disability index score of 3% or less for

## 2024-03-11 ENCOUNTER — HOSPITAL ENCOUNTER (OUTPATIENT)
Dept: PHYSICAL THERAPY | Age: 29
Setting detail: THERAPIES SERIES
Discharge: HOME | End: 2024-03-11
Payer: COMMERCIAL

## 2024-03-11 PROCEDURE — 97110 THERAPEUTIC EXERCISES: CPT

## 2024-03-11 PROCEDURE — 97140 MANUAL THERAPY 1/> REGIONS: CPT

## 2024-03-11 NOTE — FLOWSHEET NOTE
He used to bowl, but stopped due to a torn ligament in his wrist, also used to work out with weights, but stopped due to injuries.     Work: ; driving a lot.     Hobbies:   - working out, bowling,   SUBJECTIVE EXAMINATION   3/11: less chest pain since first PT session. Still gets some mid back pain, but does not seem as severe at it was.          Test used Initial score  3/4/24 03/11/2024   Pain Summary VAS 2-8/10    Functional questionnaire Modified Oswestry 6%    Other:                OBJECTIVE EXAMINATION     3/4/24  ROM/Strength: (Blank cells denote NT)      Mvmt (norm) AROM L AROM R Notes PROM L PROM R Notes               LUMBAR Flex (90) Full painless        Ext (25) Full- slight sore lumbar        SB (25) WNL WNL        Rotation (30) WNL WNL                     HIP Flex (120)          Abd (45)          ER (50)          IR (45)          Ext (20)                   KNEE Flex (140)          Ext (0)                     ANKLE DF (20)          PF (50)          Inversion (30)          Eversion (20)           Thoracic rot:   R: 90% w/ pain  L: 100% w/o pain        MMT L          MMT  R Notes     LUMBAR  Flexion       Extension       Lateral flexion        Rotation          MMT L MMT R Notes       HIP  Flexion        Abduction        ER        IR        Extension             KNEE  Flexion        Extension               ANKLE  DF        PF        Inversion        Eversion        Repeated Movements: [] Normal  [] Abnormal [] N/A    Palpation:   Tender midline T8-T11 , nil tenderness @ facets, but hypomobile R thoracic facets.   \    Posture:   Slight fwd head, otherwise unremarkable. Pt feels his posture is slouchy with sitting.     Specific Joint Mobility Testing/Accessory Motions:      Thoracic hypomobile    Special Tests:  Neg SLR, neg lumbar quadrant     Exercises/Interventions     Therapeutic Ex (71130)  Resistance Sets/ Reps/ Time Completed Notes/Cues/Progressions   cardio nustep 5' x

## 2024-03-14 ENCOUNTER — APPOINTMENT (OUTPATIENT)
Dept: PHYSICAL THERAPY | Age: 29
End: 2024-03-14
Payer: COMMERCIAL

## 2024-03-18 ENCOUNTER — HOSPITAL ENCOUNTER (OUTPATIENT)
Dept: PHYSICAL THERAPY | Age: 29
Setting detail: THERAPIES SERIES
Discharge: HOME | End: 2024-03-18
Payer: COMMERCIAL

## 2024-03-18 PROCEDURE — 97110 THERAPEUTIC EXERCISES: CPT

## 2024-03-18 PROCEDURE — 97140 MANUAL THERAPY 1/> REGIONS: CPT

## 2024-03-18 NOTE — FLOWSHEET NOTE
[facility]- Outpatient Rehabilitation and Therapy [address], [place] [address] office: [phone] fax: [phone]          Physical Therapy: TREATMENT/PROGRESS NOTE   Patient: Jordan Tee (28 y.o. male)   Examination Date: 2024   :  1995 MRN: [id number]   Visit #: 3   Insurance Allowable Auth Needed   BMN []Yes    []No    Insurance: Payor: [facility] / Plan: [facility] - [place] PPO / Product Type: *No Product type* /   Insurance ID: [id number] - ([facility])  Secondary Insurance (if applicable):    Treatment Diagnosis:   No diagnosis found.     Medical Diagnosis:  Chronic midline thoracic back pain [M54.6, G89.29]   Referring Physician: Raymond Herrera PA  PCP: Annalisa Hankins, NADIYA - CNP     Plan of care signed (Y/N):     Date of Patient follow up with Physician:      Progress Report/POC: NO  POC update due: (10 visits /OR AUTH LIMITS, whichever is less)  2024                                             Precautions/ Contra-indications:           Latex allergy:  NO  Pacemaker:    NO  Contraindications for Manipulation: None  Date of Surgery:   Other:    Red Flags:  None    C-SSRS Triggered by Intake questionnaire:   [x] No, Questionnaire did not trigger screening.   [] Yes, Patient intake triggered further evaluation      [] C-SSRS Screening completed  [] PCP notified via Plan of Care  [] Emergency services notified     Preferred Language for Healthcare:   [x] English       [] other:  Patient stated complaint:  Pt here evaluation for thoracic pain that wraps around his chest with pressure in his lower chest/ sternum area. He has been cleared by cardiac and suspected thoracic disc problem. He used to work out heavy weights 4x/wk, but has stopped over the past several months. He can recall an instance where he loaded up heavy on rows and possibly felt some strain in his back. Its relieved with movement, worse with prolonged sitting and laying supine.     He used

## 2024-03-21 ENCOUNTER — HOSPITAL ENCOUNTER (OUTPATIENT)
Dept: PHYSICAL THERAPY | Age: 29
Setting detail: THERAPIES SERIES
Discharge: HOME | End: 2024-03-21
Payer: COMMERCIAL

## 2024-03-21 PROCEDURE — 97110 THERAPEUTIC EXERCISES: CPT

## 2024-03-21 PROCEDURE — 97140 MANUAL THERAPY 1/> REGIONS: CPT

## 2024-03-21 NOTE — FLOWSHEET NOTE
[] Normal  [] Abnormal [] N/A    Palpation:   Tender midline T8-T11 , nil tenderness @ facets, but hypomobile R thoracic facets.     Posture:   Slight fwd head, otherwise unremarkable. Pt feels his posture is slouchy with sitting.     Specific Joint Mobility Testing/Accessory Motions:      Thoracic hypomobile    Special Tests:  Neg SLR, neg lumbar quadrant     Exercises/Interventions     Therapeutic Ex (22271)  Resistance Sets/ Reps/ Time Completed Notes/Cues/Progressions   cardio nustep 5' >resume L10   AAROM/ AROM              Quad thread needle  2x10ea x ea   Sidely thoracic open book  x10ea >    Prone alt supermans  15sec x 8 >    Full plank T rot 10# 2x15ea x    Prone T's 2# B 3s, 2x15 B > Corner of table   Prone Y's 2# B 3s, 2x15B > Corner of table   Prone thoracic ext iso holds  10s x 10 x    Bird dog Grn TB 10s x 10 >resume    Thoracic ext over SB  x2' >    Foam roll DTM/ T-ext str x2' >    Thoracic rot @ wall 1/2 kneel x10ea >resume prn    lawnmower 20# 2x15ea >    TRX row  2x15 x    Push up  2x15 x    Kettlebell swings 25# 2x20 x    Modified pull ups Purple under butt 2x10 x    V ups full 2x10 x Exhale with concentric                 Pt Edu   x Updated HEP, role of gym and home program for targeted mobility and strength work, appropriate levels of work/ progressive strength work subpainful   Manual Intervention (03483)  TIME     Thoracic manual  15 x     Supine T/L junction manip    seated T-rot manip ea       Lumbar manipulation  5 x Ea side 2 trials; bent knee/ straight knee                        NMR re-education (61561) Resistance Sets/ Reps/ Time Completed  CUES NEEDED                                                    Therapeutic Activity (89704)  Sets/time                       Modalities:    No modalities applied this session    Education/Home Exercise Program: Patient HEP program created electronically.  Refer to [employer] access code: 3/4: prone alt supermans, quad thread needle, thoracic ext/

## 2024-03-25 ENCOUNTER — OFFICE VISIT (OUTPATIENT)
Dept: ORTHOPEDIC SURGERY | Age: 29
End: 2024-03-25
Payer: COMMERCIAL

## 2024-03-25 VITALS — BODY MASS INDEX: 27.11 KG/M2 | RESPIRATION RATE: 18 BRPM | HEIGHT: 69 IN | WEIGHT: 183 LBS

## 2024-03-25 DIAGNOSIS — M54.6 CHRONIC MIDLINE THORACIC BACK PAIN: Primary | ICD-10-CM

## 2024-03-25 DIAGNOSIS — G89.29 CHRONIC MIDLINE THORACIC BACK PAIN: Primary | ICD-10-CM

## 2024-03-25 PROCEDURE — 99213 OFFICE O/P EST LOW 20 MIN: CPT | Performed by: PHYSICIAN ASSISTANT

## 2024-03-26 NOTE — PROGRESS NOTES
them all to his satisfaction.  I feel that Mr. Jordan Tee understands our discussion today.     Plan:  Continue with tizanidine as needed.  Continue with the prescribed physical therapy.      Follow up- 6 weeks.   Call or return to clinic if these symptoms worsen or fail to improve as anticipated.                                               Raymond Herrera PA-C   Senior Physician Assistant   [facility]                                         Disclaimer:  This note was generated with use of a verbal recognition program (DRAGON) and an attempt was made to check for errors.  It is possible that there are still dictated errors within this office note.  If so, please bring any significant errors to my attention for an addendum.  All efforts were made to ensure that this office note is accurate.

## 2024-04-04 ENCOUNTER — TELEMEDICINE (OUTPATIENT)
Dept: FAMILY MEDICINE CLINIC | Age: 29
End: 2024-04-04

## 2024-04-04 DIAGNOSIS — R68.89 FLU-LIKE SYMPTOMS: Primary | ICD-10-CM

## 2024-04-04 RX ORDER — ALBUTEROL SULFATE 90 UG/1
2 AEROSOL, METERED RESPIRATORY (INHALATION) EVERY 6 HOURS PRN
Qty: 18 G | Refills: 3 | Status: SHIPPED | OUTPATIENT
Start: 2024-04-04

## 2024-04-04 RX ORDER — DEXTROMETHORPHAN HYDROBROMIDE AND PROMETHAZINE HYDROCHLORIDE 15; 6.25 MG/5ML; MG/5ML
5 SYRUP ORAL 4 TIMES DAILY PRN
Qty: 120 ML | Refills: 0 | Status: SHIPPED | OUTPATIENT
Start: 2024-04-04 | End: 2024-04-11

## 2024-04-04 RX ORDER — OSELTAMIVIR PHOSPHATE 75 MG/1
75 CAPSULE ORAL 2 TIMES DAILY
Qty: 10 CAPSULE | Refills: 0 | Status: SHIPPED | OUTPATIENT
Start: 2024-04-04 | End: 2024-04-09

## 2024-04-04 NOTE — PROGRESS NOTES
Jordan Tee, was evaluated through a synchronous (real-time) audio-video encounter. The patient (or guardian if applicable) is aware that this is a billable service, which includes applicable co-pays. This Virtual Visit was conducted with patient's (and/or legal guardian's) consent. Patient identification was verified, and a caregiver was present when appropriate.   The patient was located at Home: [address] [place] [address].  [place] [id number]  Provider was located at Facility (Appt Dept): [address] [place] [address], [address]  [place] [phone]  Confirm you are appropriately licensed, registered, or certified to deliver care in the [facility] where the patient is located as indicated above. If you are not or unsure, please re-schedule the visit: Yes, I confirm.     Jordan Tee (:  1995) is a Established patient, presenting virtually for evaluation of the following: cough, congestion    Assessment & Plan   Below is the assessment and plan developed based on review of pertinent history, physical exam, labs, studies, and medications.  1. Flu-like symptoms  -     oseltamivir (TAMIFLU) 75 MG capsule; Take 1 capsule by mouth 2 times daily for 5 days, Disp-10 capsule, R-0Normal  -     promethazine-dextromethorphan (PROMETHAZINE-DM) 6.25-15 MG/5ML syrup; Take 5 mLs by mouth 4 times daily as needed for Cough, Disp-120 mL, R-0Normal  -     albuterol sulfate HFA (PROAIR HFA) 108 (90 Base) MCG/ACT inhaler; Inhale 2 puffs into the lungs every 6 hours as needed for Wheezing, Disp-18 g, R-3Normal  - push fluids, tylenol and rest      No follow-ups on file.       Subjective   HPI    Patient presents for VV for cough, congestion, aches, chills, fatigue for 2 days. Denies any wheezing or sob. Denies chest pain. Tried otc medication with no relief. Denies c ovid or flu testing. States weakness and unable to get off the couch. States nausea and decreased appetite. States some sob with the cough.     Review of Systems     See

## 2024-04-14 DIAGNOSIS — R68.89 FLU-LIKE SYMPTOMS: ICD-10-CM

## 2024-04-15 RX ORDER — ALBUTEROL SULFATE 90 UG/1
2 AEROSOL, METERED RESPIRATORY (INHALATION) EVERY 6 HOURS PRN
Qty: 18 G | Refills: 3 | OUTPATIENT
Start: 2024-04-15

## 2024-04-15 RX ORDER — OMEPRAZOLE 40 MG/1
40 CAPSULE, DELAYED RELEASE ORAL
Qty: 30 CAPSULE | Refills: 1 | Status: SHIPPED | OUTPATIENT
Start: 2024-04-15

## 2024-04-15 NOTE — TELEPHONE ENCOUNTER
Medication:   Requested Prescriptions     Pending Prescriptions Disp Refills    omeprazole (PRILOSEC) 40 MG delayed release capsule 30 capsule 1     Sig: Take 1 capsule by mouth every morning (before breakfast)     Refused Prescriptions Disp Refills    albuterol sulfate HFA (PROAIR HFA) 108 (90 Base) MCG/ACT inhaler 18 g 3     Sig: Inhale 2 puffs into the lungs every 6 hours as needed for Wheezing       Last Filled:  2/23/2024    Patient Phone Number: [phone] (home)     Last appt: 4/4/2024   Next appt: Visit date not found

## 2024-04-29 RX ORDER — MONTELUKAST SODIUM 10 MG/1
10 TABLET ORAL DAILY
Qty: 30 TABLET | Refills: 5 | Status: SHIPPED | OUTPATIENT
Start: 2024-04-29

## 2024-05-14 ENCOUNTER — PATIENT MESSAGE (OUTPATIENT)
Dept: FAMILY MEDICINE CLINIC | Age: 29
End: 2024-05-14

## 2024-05-15 NOTE — TELEPHONE ENCOUNTER
Returning call to pt. Offered pt appt for 1:30 or 1:45 this afternoon. Pt is unable to make either of those times. He is scheduled for tomorrow at 3:45- latest appt available

## 2024-05-16 ENCOUNTER — OFFICE VISIT (OUTPATIENT)
Dept: FAMILY MEDICINE CLINIC | Age: 29
End: 2024-05-16
Payer: COMMERCIAL

## 2024-05-16 VITALS
WEIGHT: 185 LBS | DIASTOLIC BLOOD PRESSURE: 80 MMHG | HEART RATE: 78 BPM | OXYGEN SATURATION: 96 % | BODY MASS INDEX: 27.4 KG/M2 | TEMPERATURE: 97.3 F | HEIGHT: 69 IN | SYSTOLIC BLOOD PRESSURE: 126 MMHG

## 2024-05-16 DIAGNOSIS — R05.3 CHRONIC COUGH: Primary | ICD-10-CM

## 2024-05-16 PROCEDURE — G8427 DOCREV CUR MEDS BY ELIG CLIN: HCPCS | Performed by: NURSE PRACTITIONER

## 2024-05-16 PROCEDURE — 4004F PT TOBACCO SCREEN RCVD TLK: CPT | Performed by: NURSE PRACTITIONER

## 2024-05-16 PROCEDURE — G8419 CALC BMI OUT NRM PARAM NOF/U: HCPCS | Performed by: NURSE PRACTITIONER

## 2024-05-16 PROCEDURE — 99213 OFFICE O/P EST LOW 20 MIN: CPT | Performed by: NURSE PRACTITIONER

## 2024-05-16 RX ORDER — PREDNISONE 10 MG/1
10 TABLET ORAL 2 TIMES DAILY
Qty: 10 TABLET | Refills: 0 | Status: SHIPPED | OUTPATIENT
Start: 2024-05-16 | End: 2024-05-21
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
## 2024-05-30 RX ORDER — OMEPRAZOLE 40 MG/1
40 CAPSULE, DELAYED RELEASE ORAL
Qty: 30 CAPSULE | Refills: 1 | Status: SHIPPED | OUTPATIENT
Start: 2024-05-30

## 2024-05-30 NOTE — TELEPHONE ENCOUNTER
Medication:   Requested Prescriptions     Pending Prescriptions Disp Refills    omeprazole (PRILOSEC) 40 MG delayed release capsule [Pharmacy Med Name: OMEPRAZOLE DR 40 MG CAPSULE] 30 capsule 1     Sig: TAKE 1 CAPSULE BY MOUTH EVERY DAY IN THE MORNING BEFORE BREAKFAST       Last Filled:  4/15/2024    Patient Phone Number: [phone] (home)     Last appt: 5/16/2024   Next appt: Visit date not found

## 2024-06-17 RX ORDER — DOXYCYCLINE HYCLATE 100 MG
100 TABLET ORAL 2 TIMES DAILY
Qty: 20 TABLET | Refills: 0 | Status: SHIPPED | OUTPATIENT
Start: 2024-06-17 | End: 2024-06-27

## 2024-07-17 ENCOUNTER — OFFICE VISIT (OUTPATIENT)
Dept: FAMILY MEDICINE CLINIC | Age: 29
End: 2024-07-17
Payer: COMMERCIAL

## 2024-07-17 VITALS
HEART RATE: 84 BPM | SYSTOLIC BLOOD PRESSURE: 124 MMHG | DIASTOLIC BLOOD PRESSURE: 82 MMHG | BODY MASS INDEX: 27.91 KG/M2 | OXYGEN SATURATION: 96 % | WEIGHT: 188.4 LBS | HEIGHT: 69 IN

## 2024-07-17 DIAGNOSIS — Z00.00 ROUTINE GENERAL MEDICAL EXAMINATION AT A HEALTH CARE FACILITY: Primary | ICD-10-CM

## 2024-07-17 DIAGNOSIS — T14.8XXA WOUND INFECTION: ICD-10-CM

## 2024-07-17 DIAGNOSIS — R53.83 OTHER FATIGUE: ICD-10-CM

## 2024-07-17 DIAGNOSIS — L08.9 WOUND INFECTION: ICD-10-CM

## 2024-07-17 DIAGNOSIS — K21.9 GASTROESOPHAGEAL REFLUX DISEASE WITHOUT ESOPHAGITIS: ICD-10-CM

## 2024-07-17 DIAGNOSIS — F41.9 ANXIETY: ICD-10-CM

## 2024-07-17 LAB
BASOPHILS # BLD: 0 K/UL (ref 0–0.2)
BASOPHILS NFR BLD: 0.7 %
DEPRECATED RDW RBC AUTO: 12.8 % (ref 12.4–15.4)
EOSINOPHIL # BLD: 0.2 K/UL (ref 0–0.6)
EOSINOPHIL NFR BLD: 2.6 %
HCT VFR BLD AUTO: 44.3 % (ref 40.5–52.5)
HGB BLD-MCNC: 16 G/DL (ref 13.5–17.5)
LYMPHOCYTES # BLD: 1.7 K/UL (ref 1–5.1)
LYMPHOCYTES NFR BLD: 24.1 %
MCH RBC QN AUTO: 30.9 PG (ref 26–34)
MCHC RBC AUTO-ENTMCNC: 36.1 G/DL (ref 31–36)
MCV RBC AUTO: 85.7 FL (ref 80–100)
MONOCYTES # BLD: 0.8 K/UL (ref 0–1.3)
MONOCYTES NFR BLD: 11.5 %
NEUTROPHILS # BLD: 4.4 K/UL (ref 1.7–7.7)
NEUTROPHILS NFR BLD: 61.1 %
PLATELET # BLD AUTO: 256 K/UL (ref 135–450)
PMV BLD AUTO: 8.7 FL (ref 5–10.5)
RBC # BLD AUTO: 5.17 M/UL (ref 4.2–5.9)
WBC # BLD AUTO: 7.2 K/UL (ref 4–11)

## 2024-07-17 PROCEDURE — 99214 OFFICE O/P EST MOD 30 MIN: CPT | Performed by: NURSE PRACTITIONER

## 2024-07-17 RX ORDER — CLINDAMYCIN HYDROCHLORIDE 300 MG/1
300 CAPSULE ORAL 3 TIMES DAILY
Qty: 21 CAPSULE | Refills: 0 | Status: SHIPPED | OUTPATIENT
Start: 2024-07-17 | End: 2024-07-24

## 2024-07-17 RX ORDER — PROPRANOLOL HYDROCHLORIDE 20 MG/1
20 TABLET ORAL 2 TIMES DAILY
Qty: 60 TABLET | Refills: 0 | Status: SHIPPED | OUTPATIENT
Start: 2024-07-17

## 2024-07-17 RX ORDER — PANTOPRAZOLE SODIUM 20 MG/1
20 TABLET, DELAYED RELEASE ORAL DAILY
Qty: 30 TABLET | Refills: 3 | Status: SHIPPED | OUTPATIENT
Start: 2024-07-17

## 2024-07-17 SDOH — ECONOMIC STABILITY: FOOD INSECURITY: WITHIN THE PAST 12 MONTHS, THE FOOD YOU BOUGHT JUST DIDN'T LAST AND YOU DIDN'T HAVE MONEY TO GET MORE.: NEVER TRUE

## 2024-07-17 SDOH — ECONOMIC STABILITY: INCOME INSECURITY: HOW HARD IS IT FOR YOU TO PAY FOR THE VERY BASICS LIKE FOOD, HOUSING, MEDICAL CARE, AND HEATING?: NOT HARD AT ALL

## 2024-07-17 SDOH — ECONOMIC STABILITY: FOOD INSECURITY: WITHIN THE PAST 12 MONTHS, YOU WORRIED THAT YOUR FOOD WOULD RUN OUT BEFORE YOU GOT MONEY TO BUY MORE.: NEVER TRUE

## 2024-07-17 ASSESSMENT — ENCOUNTER SYMPTOMS
CONSTIPATION: 0
RESPIRATORY NEGATIVE: 1
COLOR CHANGE: 0
DIARRHEA: 0
ABDOMINAL PAIN: 0
NAUSEA: 0
SHORTNESS OF BREATH: 0
ALLERGIC/IMMUNOLOGIC NEGATIVE: 1
TROUBLE SWALLOWING: 0
SORE THROAT: 0
EYES NEGATIVE: 1
VOMITING: 0
SINUS PRESSURE: 0
BACK PAIN: 0
RHINORRHEA: 0
SINUS PAIN: 0

## 2024-07-17 ASSESSMENT — ANXIETY QUESTIONNAIRES
GAD7 TOTAL SCORE: 9
IF YOU CHECKED OFF ANY PROBLEMS ON THIS QUESTIONNAIRE, HOW DIFFICULT HAVE THESE PROBLEMS MADE IT FOR YOU TO DO YOUR WORK, TAKE CARE OF THINGS AT HOME, OR GET ALONG WITH OTHER PEOPLE: SOMEWHAT DIFFICULT
1. FEELING NERVOUS, ANXIOUS, OR ON EDGE: SEVERAL DAYS
3. WORRYING TOO MUCH ABOUT DIFFERENT THINGS: MORE THAN HALF THE DAYS
6. BECOMING EASILY ANNOYED OR IRRITABLE: NOT AT ALL
2. NOT BEING ABLE TO STOP OR CONTROL WORRYING: MORE THAN HALF THE DAYS
4. TROUBLE RELAXING: SEVERAL DAYS
7. FEELING AFRAID AS IF SOMETHING AWFUL MIGHT HAPPEN: NOT AT ALL
5. BEING SO RESTLESS THAT IT IS HARD TO SIT STILL: NEARLY EVERY DAY

## 2024-07-17 ASSESSMENT — PATIENT HEALTH QUESTIONNAIRE - PHQ9
SUM OF ALL RESPONSES TO PHQ QUESTIONS 1-9: 0
2. FEELING DOWN, DEPRESSED OR HOPELESS: NOT AT ALL

## 2024-07-17 NOTE — PROGRESS NOTES
2024    Jordan Tee (:  1995) is a 29 y.o. male, here for a preventive medicine evaluation. Here for well check and labs. States concerns today are anxiety, fatigue and his reflux. States he has had two episodes of where he felt like he was going to pass out while at work. States he has never had that before. States he tries to stay hydrated and push fluids. Keeps a healthy diet. Denies any chronic conditions. States he has had anxiety of things he cannot control or that come up that he is unaware of that cause him to become sob, pain in chest. States he has been using breathing to help calm himself before it becomes worse than it is. States his reflux has also been bothering him. States a lot of burning in throat after meals, belching and heart  burn. Feels the prilosec works at times but not thoroughly.     Subjective   Patient Active Problem List   Diagnosis    Allergic rhinitis    Attention deficit hyperactivity disorder (ADHD), predominantly inattentive type    Hyperhidrosis    Chronic midline thoracic back pain       Review of Systems   Constitutional:  Positive for fatigue. Negative for activity change, appetite change, chills and fever.   HENT:  Negative for congestion, ear discharge, ear pain, hearing loss, postnasal drip, rhinorrhea, sinus pressure, sinus pain, sneezing, sore throat, tinnitus and trouble swallowing.    Eyes: Negative.    Respiratory: Negative.  Negative for shortness of breath.    Cardiovascular: Negative.    Gastrointestinal:  Negative for abdominal pain, constipation, diarrhea, nausea and vomiting.   Genitourinary:  Negative for decreased urine volume, difficulty urinating, dysuria, flank pain, frequency, hematuria and urgency.   Musculoskeletal:  Negative for arthralgias, back pain, joint swelling, myalgias and neck pain.   Skin:  Negative for color change, rash and wound.   Allergic/Immunologic: Negative.    Neurological:  Negative for dizziness, weakness,

## 2024-07-18 LAB
ANION GAP SERPL CALCULATED.3IONS-SCNC: 13 MMOL/L (ref 3–16)
BUN SERPL-MCNC: 19 MG/DL (ref 7–20)
CALCIUM SERPL-MCNC: 10.1 MG/DL (ref 8.3–10.6)
CHLORIDE SERPL-SCNC: 103 MMOL/L (ref 99–110)
CO2 SERPL-SCNC: 24 MMOL/L (ref 21–32)
CREAT SERPL-MCNC: 0.9 MG/DL (ref 0.9–1.3)
FOLATE SERPL-MCNC: 15.16 NG/ML (ref 4.78–24.2)
GFR SERPLBLD CREATININE-BSD FMLA CKD-EPI: >90 ML/MIN/{1.73_M2}
GLUCOSE SERPL-MCNC: 83 MG/DL (ref 70–99)
IRON SATN MFR SERPL: 22 % (ref 20–50)
IRON SERPL-MCNC: 75 UG/DL (ref 59–158)
MAGNESIUM SERPL-MCNC: 2.1 MG/DL (ref 1.8–2.4)
POTASSIUM SERPL-SCNC: 4.2 MMOL/L (ref 3.5–5.1)
SODIUM SERPL-SCNC: 140 MMOL/L (ref 136–145)
T4 FREE SERPL-MCNC: 1.4 NG/DL (ref 0.9–1.8)
TIBC SERPL-MCNC: 335 UG/DL (ref 260–445)
TSH SERPL DL<=0.005 MIU/L-ACNC: 1.39 UIU/ML (ref 0.27–4.2)
VIT B12 SERPL-MCNC: 1028 PG/ML (ref 211–911)

## 2024-07-29 ENCOUNTER — OFFICE VISIT (OUTPATIENT)
Dept: FAMILY MEDICINE CLINIC | Age: 29
End: 2024-07-29
Payer: COMMERCIAL

## 2024-07-29 ENCOUNTER — PATIENT MESSAGE (OUTPATIENT)
Dept: FAMILY MEDICINE CLINIC | Age: 29
End: 2024-07-29

## 2024-07-29 VITALS
SYSTOLIC BLOOD PRESSURE: 118 MMHG | DIASTOLIC BLOOD PRESSURE: 78 MMHG | OXYGEN SATURATION: 96 % | WEIGHT: 191 LBS | BODY MASS INDEX: 28.29 KG/M2 | TEMPERATURE: 97.1 F | HEART RATE: 80 BPM | HEIGHT: 69 IN

## 2024-07-29 DIAGNOSIS — J02.9 SORE THROAT: Primary | ICD-10-CM

## 2024-07-29 DIAGNOSIS — J36 PERITONSILLAR ABSCESS: ICD-10-CM

## 2024-07-29 DIAGNOSIS — J02.0 STREP THROAT: ICD-10-CM

## 2024-07-29 PROCEDURE — 99213 OFFICE O/P EST LOW 20 MIN: CPT | Performed by: NURSE PRACTITIONER

## 2024-07-29 PROCEDURE — G8427 DOCREV CUR MEDS BY ELIG CLIN: HCPCS | Performed by: NURSE PRACTITIONER

## 2024-07-29 PROCEDURE — 87651 STREP A DNA AMP PROBE: CPT | Performed by: NURSE PRACTITIONER

## 2024-07-29 PROCEDURE — G8419 CALC BMI OUT NRM PARAM NOF/U: HCPCS | Performed by: NURSE PRACTITIONER

## 2024-07-29 PROCEDURE — 4004F PT TOBACCO SCREEN RCVD TLK: CPT | Performed by: NURSE PRACTITIONER

## 2024-07-29 RX ORDER — CLINDAMYCIN HCL 300 MG
300 CAPSULE ORAL 3 TIMES DAILY
Qty: 21 CAPSULE | Refills: 0 | Status: SHIPPED | OUTPATIENT
Start: 2024-07-29 | End: 2024-07-30 | Stop reason: SDUPTHER

## 2024-07-29 NOTE — PROGRESS NOTES
PROGRESS NOTE     Swatih Tolbert CNP  [facility]  [address] [place] [address], [address]  [facility] [id number] [phone] office  [phone] fax    Date of Service:  7/29/2024    Assessment / Plan:     1. Sore throat  Rapid negative.     - POCT Rapid Strep A DNA  - Culture, Throat    2. Peritonsillar abscess  Will treat with clindamycin. Advised patient of signs of worsening symptoms. Notify office if not resolving.     Subjective:      Patient ID: .Jordan Tee is a 29 y.o. male      CC: Sore throat    HPI  Respiratory Symptoms:  Patient complains of 1 week(s) history of post nasal drip and sore throat.  Symptoms have been worsening with time. He denies any other symptoms .  Relevant PMH: No pertinent PMH. Smoking history:  He  reports that he has been smoking e-cigarettes. He has never used smokeless tobacco. He has had no known ill contacts. Treatment to date: none.    Pain has been off and on for about a week, today has been the worse day. Hurts to swallow, pain feels sharp.    He has a red swollen lesion just above his left tonsil.       Vitals:    07/29/24 1601   BP: 118/78   Site: Right Upper Arm   Position: Sitting   Cuff Size: Large Adult   Pulse: 80   Temp: 97.1 °F (36.2 °C)   SpO2: 96%   Weight: 86.6 kg (191 lb)   Height: 1.753 m (5' 9\")       Outpatient Medications Marked as Taking for the 7/29/24 encounter (Office Visit) with Swathi Tolbert APRN - CNP   Medication Sig Dispense Refill    [DISCONTINUED] clindamycin (CLEOCIN) 300 MG capsule Take 1 capsule by mouth 3 times daily for 7 days 21 capsule 0    pantoprazole (PROTONIX) 20 MG tablet Take 1 tablet by mouth daily 30 tablet 3    [DISCONTINUED] propranolol (INDERAL) 20 MG tablet Take 1 tablet by mouth 2 times daily 60 tablet 0    omeprazole (PRILOSEC) 40 MG delayed release capsule TAKE 1 CAPSULE BY MOUTH EVERY DAY IN THE MORNING BEFORE BREAKFAST (Patient taking differently: Take 20 mg by mouth every morning (before breakfast)) 30 capsule

## 2024-07-29 NOTE — TELEPHONE ENCOUNTER
If patient would like to be tested for strep happy to see today if he can be here by 4.    Please document call and then close encounter.  thanks

## 2024-07-30 DIAGNOSIS — J36 PERITONSILLAR ABSCESS: ICD-10-CM

## 2024-07-30 RX ORDER — CLINDAMYCIN HYDROCHLORIDE 300 MG/1
300 CAPSULE ORAL 3 TIMES DAILY
Qty: 21 CAPSULE | Refills: 0 | Status: SHIPPED | OUTPATIENT
Start: 2024-07-30 | End: 2024-08-06

## 2024-07-30 NOTE — TELEPHONE ENCOUNTER
Pt called in stating that the clindamycin (CLEOCIN) 300 MG capsule Was out of stock at the [facility] and wanted it to be re sent to [facility]

## 2024-07-30 NOTE — TELEPHONE ENCOUNTER
Was out of stock at the [facility] and wanted it to be re sent to [facility]   Medication:   Requested Prescriptions     Pending Prescriptions Disp Refills    clindamycin (CLEOCIN) 300 MG capsule 21 capsule 0     Sig: Take 1 capsule by mouth 3 times daily for 7 days       Last Filled:  7/29/2024    Patient Phone Number: [phone] (home)     Last appt: 7/29/2024   Next appt: Visit date not found

## 2024-07-31 LAB — BACTERIA THROAT AEROBE CULT: NORMAL

## 2024-08-09 DIAGNOSIS — F41.9 ANXIETY: ICD-10-CM

## 2024-08-09 RX ORDER — PROPRANOLOL HYDROCHLORIDE 20 MG/1
20 TABLET ORAL 2 TIMES DAILY
Qty: 180 TABLET | Refills: 1 | Status: SHIPPED | OUTPATIENT
Start: 2024-08-09

## 2024-08-09 NOTE — TELEPHONE ENCOUNTER
Medication:   Requested Prescriptions     Pending Prescriptions Disp Refills    propranolol (INDERAL) 20 MG tablet [Pharmacy Med Name: PROPRANOLOL 20 MG TABLET] 180 tablet 1     Sig: TAKE 1 TABLET BY MOUTH TWICE A DAY        Last Filled:  07/17/24    Patient Phone Number: [phone] (home)     Last appt: 7/29/2024   Next appt: Visit date not found    Last OARRS:       7/16/2019    11:18 AM   RX Monitoring   Periodic Controlled Substance Monitoring No signs of potential drug abuse or diversion identified.

## 2024-08-28 RX ORDER — MONTELUKAST SODIUM 10 MG/1
10 TABLET ORAL DAILY
Qty: 90 TABLET | Refills: 1 | Status: SHIPPED | OUTPATIENT
Start: 2024-08-28

## 2024-10-01 ENCOUNTER — HOSPITAL ENCOUNTER (OUTPATIENT)
Dept: GENERAL RADIOLOGY | Age: 29
Discharge: HOME | End: 2024-10-01
Payer: COMMERCIAL

## 2024-10-01 ENCOUNTER — OFFICE VISIT (OUTPATIENT)
Dept: FAMILY MEDICINE CLINIC | Age: 29
End: 2024-10-01
Payer: COMMERCIAL

## 2024-10-01 ENCOUNTER — HOSPITAL ENCOUNTER (OUTPATIENT)
Age: 29
Discharge: HOME | End: 2024-10-01
Payer: COMMERCIAL

## 2024-10-01 VITALS — WEIGHT: 193.8 LBS | DIASTOLIC BLOOD PRESSURE: 76 MMHG | BODY MASS INDEX: 28.62 KG/M2 | SYSTOLIC BLOOD PRESSURE: 124 MMHG

## 2024-10-01 DIAGNOSIS — S49.92XA INJURY OF LEFT SHOULDER, INITIAL ENCOUNTER: ICD-10-CM

## 2024-10-01 DIAGNOSIS — S49.92XA INJURY OF LEFT SHOULDER, INITIAL ENCOUNTER: Primary | ICD-10-CM

## 2024-10-01 PROCEDURE — 4004F PT TOBACCO SCREEN RCVD TLK: CPT | Performed by: NURSE PRACTITIONER

## 2024-10-01 PROCEDURE — G8427 DOCREV CUR MEDS BY ELIG CLIN: HCPCS | Performed by: NURSE PRACTITIONER

## 2024-10-01 PROCEDURE — G8484 FLU IMMUNIZE NO ADMIN: HCPCS | Performed by: NURSE PRACTITIONER

## 2024-10-01 PROCEDURE — 99213 OFFICE O/P EST LOW 20 MIN: CPT | Performed by: NURSE PRACTITIONER

## 2024-10-01 PROCEDURE — 73030 X-RAY EXAM OF SHOULDER: CPT

## 2024-10-01 PROCEDURE — G8419 CALC BMI OUT NRM PARAM NOF/U: HCPCS | Performed by: NURSE PRACTITIONER

## 2024-10-01 PROCEDURE — 73060 X-RAY EXAM OF HUMERUS: CPT

## 2024-10-01 RX ORDER — PREDNISONE 20 MG/1
40 TABLET ORAL DAILY
Qty: 20 TABLET | Refills: 0 | Status: SHIPPED | OUTPATIENT
Start: 2024-10-01 | End: 2024-10-11
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
## 2024-11-08 DIAGNOSIS — K21.9 GASTROESOPHAGEAL REFLUX DISEASE WITHOUT ESOPHAGITIS: ICD-10-CM

## 2024-11-08 RX ORDER — PANTOPRAZOLE SODIUM 20 MG/1
20 TABLET, DELAYED RELEASE ORAL DAILY
Qty: 90 TABLET | Refills: 1 | Status: SHIPPED | OUTPATIENT
Start: 2024-11-08

## 2024-11-08 NOTE — TELEPHONE ENCOUNTER
Medication:   Requested Prescriptions     Pending Prescriptions Disp Refills    pantoprazole (PROTONIX) 20 MG tablet [Pharmacy Med Name: PANTOPRAZOLE SOD DR 20 MG TAB] 90 tablet 1     Sig: TAKE 1 TABLET BY MOUTH EVERY DAY        Last Filled:  07/17/2024    Patient Phone Number: [phone] (home)     Last appt: 10/1/2024   Next appt: Visit date not found    Last OARRS:       7/16/2019    11:18 AM   RX Monitoring   Periodic Controlled Substance Monitoring No signs of potential drug abuse or diversion identified.

## 2024-11-12 ENCOUNTER — OFFICE VISIT (OUTPATIENT)
Dept: FAMILY MEDICINE CLINIC | Age: 29
End: 2024-11-12
Payer: COMMERCIAL

## 2024-11-12 VITALS
BODY MASS INDEX: 28.73 KG/M2 | DIASTOLIC BLOOD PRESSURE: 85 MMHG | HEIGHT: 69 IN | OXYGEN SATURATION: 98 % | WEIGHT: 194 LBS | SYSTOLIC BLOOD PRESSURE: 119 MMHG | HEART RATE: 88 BPM

## 2024-11-12 DIAGNOSIS — S60.451A FOREIGN BODY OF LEFT INDEX FINGER: Primary | ICD-10-CM

## 2024-11-12 PROCEDURE — 99213 OFFICE O/P EST LOW 20 MIN: CPT | Performed by: NURSE PRACTITIONER

## 2024-11-12 RX ORDER — CLINDAMYCIN HYDROCHLORIDE 300 MG/1
300 CAPSULE ORAL 3 TIMES DAILY
Qty: 21 CAPSULE | Refills: 0 | Status: SHIPPED | OUTPATIENT
Start: 2024-11-12 | End: 2024-11-19

## 2024-11-12 NOTE — PROGRESS NOTES
Jordan Tee (:  1995) is a 29 y.o. male,Established patient, here for evaluation of the following chief complaint(s):  Finger Injury (Left pointing finger, started to swell few days ago)         Assessment & Plan  Foreign body of left index finger   - advil for the pain and inflammation  - epsom salt soaks to help with the infection and inflammation  - follow up with surgery    Orders:    clindamycin (CLEOCIN) 300 MG capsule; Take 1 capsule by mouth 3 times daily for 7 days    XR HAND LEFT (2 VIEWS); Future    [facility] - Lonny Goff MD, General Surgery, [facility]      No follow-ups on file.       Subjective   HPI    Patient presents today for foreign body of left index finger. States its been there for the last 4 years. States it swells on and off and the pain is on and off. States over the last week, the swelling and pain have increased. Denies having any debridement or follow up done.     Review of Systems   See HPI    Objective   Physical Exam  Vitals and nursing note reviewed.   Constitutional:       Appearance: Normal appearance.   Musculoskeletal:         General: Normal range of motion.   Skin:     General: Skin is warm and dry.      Findings: Erythema and lesion present.      Comments: Left index finer   Neurological:      General: No focal deficit present.      Mental Status: He is alert and oriented to person, place, and time.            On this date 2024 I have spent 25 minutes reviewing previous notes, test results and face to face with the patient discussing the diagnosis and importance of compliance with the treatment plan as well as documenting on the day of the visit.      An electronic signature was used to authenticate this note.    --Annalisa Hankins, APRN - CNP

## 2024-11-18 ENCOUNTER — HOSPITAL ENCOUNTER (OUTPATIENT)
Dept: GENERAL RADIOLOGY | Age: 29
Discharge: HOME | End: 2024-11-18
Payer: COMMERCIAL

## 2024-11-18 ENCOUNTER — HOSPITAL ENCOUNTER (OUTPATIENT)
Age: 29
Discharge: HOME | End: 2024-11-18
Payer: COMMERCIAL

## 2024-11-18 DIAGNOSIS — S60.451A FOREIGN BODY OF LEFT INDEX FINGER: ICD-10-CM

## 2024-11-18 PROCEDURE — 73140 X-RAY EXAM OF FINGER(S): CPT

## 2024-11-18 PROCEDURE — 73130 X-RAY EXAM OF HAND: CPT

## 2024-11-20 ENCOUNTER — TELEPHONE (OUTPATIENT)
Dept: FAMILY MEDICINE CLINIC | Age: 29
End: 2024-11-20

## 2024-11-20 DIAGNOSIS — S60.451A FOREIGN BODY OF LEFT INDEX FINGER: Primary | ICD-10-CM

## 2024-11-20 NOTE — TELEPHONE ENCOUNTER
Pt returning call. Unable to make appt that has been scheduled for him. Provided him Dr. Irizarry's contact information [phone] so he can call to reschedule the appointment.

## 2024-11-20 NOTE — TELEPHONE ENCOUNTER
Called Dr Irizarry office can see Friday, at 845am if does not work can call to reschedule. But please do not eat or drink after midnight for if they are able to do at that moment. URIEL left for Richard to call office at [phone]

## 2024-11-20 NOTE — TELEPHONE ENCOUNTER
Was referred to general surgeon due to foreign body in left index finger. When called to schedule was advised that they do not work on fingers. Calling to get a referral to someone else. Please advise.  Please call pt back at [phone]

## 2024-11-26 DIAGNOSIS — F41.9 ANXIETY: ICD-10-CM

## 2024-11-26 RX ORDER — PROPRANOLOL HCL 20 MG
20 TABLET ORAL 2 TIMES DAILY
Qty: 180 TABLET | Refills: 1 | Status: SHIPPED | OUTPATIENT
Start: 2024-11-26

## 2024-11-26 NOTE — TELEPHONE ENCOUNTER
Last office visit :11/12/2024    Future Appointments   Date Time Provider Department Center   12/4/2024  3:00 PM James Irizarry MD [facility]

## 2024-12-02 SDOH — HEALTH STABILITY: PHYSICAL HEALTH: ON AVERAGE, HOW MANY DAYS PER WEEK DO YOU ENGAGE IN MODERATE TO STRENUOUS EXERCISE (LIKE A BRISK WALK)?: 3 DAYS

## 2024-12-02 SDOH — HEALTH STABILITY: PHYSICAL HEALTH: ON AVERAGE, HOW MANY MINUTES DO YOU ENGAGE IN EXERCISE AT THIS LEVEL?: 20 MIN

## 2024-12-04 ENCOUNTER — OFFICE VISIT (OUTPATIENT)
Dept: ORTHOPEDIC SURGERY | Age: 29
End: 2024-12-04

## 2024-12-04 VITALS — WEIGHT: 194 LBS | RESPIRATION RATE: 16 BRPM | HEIGHT: 69 IN | BODY MASS INDEX: 28.73 KG/M2

## 2024-12-04 DIAGNOSIS — S60.459S: Primary | ICD-10-CM

## 2024-12-04 PROBLEM — S60.459A: Status: ACTIVE | Noted: 2024-12-04

## 2024-12-04 NOTE — PROGRESS NOTES
CHIEF COMPLAINT: Left hand pain/ index finger distal phalanx deep FB(glass).    DATE OF INJURY: 2020    HISTORY:  Mr. Tee 29 y.o.  male right handed presents today for consultation request from Annalisa Hankins APRN - CNP for evaluation of a left hand injury which occurred when he had a work injury in 2020 with a glass FB still in his left index finger. He is complaining of index finger pain and swelling, 7/10. This is better with elevation and worse with pressure on the finger. The pain is sharp and not radiating. No other complaint.      Past Medical History:   Diagnosis Date    Allergic rhinitis, seasonal     Anxiety 8/1/2023    Attention deficit hyperactivity disorder (ADHD), predominantly inattentive type 08/31/2018    Conjunctivitis, allergic     COVID-19     GERD (gastroesophageal reflux disease)        Past Surgical History:   Procedure Laterality Date    NASAL FRACTURE SURGERY  07/14/2021    with septoplasty    UPPER GASTROINTESTINAL ENDOSCOPY N/A 12/15/2022    EGD BIOPSY performed by Romeo Andrade MD at [facility] ENDOSCOPY       Social History     Socioeconomic History    Marital status:      Spouse name: Tahir-janna    Number of children: 0    Years of education: Not on file    Highest education level: Not on file   Occupational History    Occupation: Student - [employer]    Occupation:  at [employer]   Tobacco Use    Smoking status: Some Days     Types: E-Cigarettes    Smokeless tobacco: Never    Tobacco comments:     counseled on tobacco exposure avoidance   Vaping Use    Vaping status: Some Days    Substances: Nicotine   Substance and Sexual Activity    Alcohol use: Yes     Alcohol/week: 5.0 standard drinks of alcohol     Types: 5 Cans of beer per week     Comment: Doesn’t happen every week    Drug use: No    Sexual activity: Yes     Partners: Female   Other Topics Concern    Not on file   Social History Narrative    Not on file     Social

## 2024-12-12 ENCOUNTER — OFFICE VISIT (OUTPATIENT)
Dept: FAMILY MEDICINE CLINIC | Age: 29
End: 2024-12-12

## 2024-12-12 VITALS
WEIGHT: 196 LBS | TEMPERATURE: 97.8 F | SYSTOLIC BLOOD PRESSURE: 126 MMHG | DIASTOLIC BLOOD PRESSURE: 84 MMHG | OXYGEN SATURATION: 96 % | BODY MASS INDEX: 29.03 KG/M2 | HEART RATE: 89 BPM | HEIGHT: 69 IN

## 2024-12-12 DIAGNOSIS — J02.9 SORE THROAT: Primary | ICD-10-CM

## 2024-12-12 NOTE — PROGRESS NOTES
PROGRESS NOTE     Swathi Tolbert CNP  [facility]  [address] [place] [address], [address]  [facility] [id number] [phone] office  [phone] fax    Date of Service:  12/12/2024      Assessment / Plan:     1. Sore throat  Rapid negative. Likely viral. Sending culture to confirm. Push fluids, rest, ibuprofen as needed.     - POCT Rapid Strep A DNA  - Culture, Throat    Subjective:      Patient ID: .Jordan Tee is a 29 y.o. male      CC: Sore throat    HPI  Respiratory Symptoms:  Patient complains of 3 day(s) history of headache, clear nasal discharge, and sore throat.  Symptoms have been worsening with time. He denies any other symptoms .  Relevant PMH: No pertinent PMH. Smoking history:  He  reports that he has been smoking e-cigarettes. He has never used smokeless tobacco. He has had no known ill contacts. Treatment to date: OTC cough suppressant, Acetaminophen.    Did not do covid test.     Vitals:    12/12/24 1125   BP: 126/84   Site: Left Upper Arm   Position: Sitting   Cuff Size: Medium Adult   Pulse: 89   Temp: 97.8 °F (36.6 °C)   SpO2: 96%   Weight: 88.9 kg (196 lb)   Height: 1.753 m (5' 9\")       Outpatient Medications Marked as Taking for the 12/12/24 encounter (Office Visit) with Swathi Tolbert APRN - CNP   Medication Sig Dispense Refill    propranolol (INDERAL) 20 MG tablet TAKE 1 TABLET BY MOUTH TWICE A  tablet 1    pantoprazole (PROTONIX) 20 MG tablet TAKE 1 TABLET BY MOUTH EVERY DAY 90 tablet 1    montelukast (SINGULAIR) 10 MG tablet TAKE 1 TABLET BY MOUTH EVERY DAY 90 tablet 1    Cetirizine-Pseudoephedrine (ZYRTEC-D PO) Take by mouth daily         Past Medical History:   Diagnosis Date    Allergic rhinitis, seasonal     Anxiety 8/1/2023    Attention deficit hyperactivity disorder (ADHD), predominantly inattentive type 08/31/2018    Conjunctivitis, allergic     COVID-19     GERD (gastroesophageal reflux disease)        Past Surgical History:   Procedure Laterality Date    NASAL

## 2024-12-15 ENCOUNTER — TELEPHONE (OUTPATIENT)
Dept: FAMILY MEDICINE CLINIC | Age: 29
End: 2024-12-15

## 2024-12-15 DIAGNOSIS — B34.8 PARAINFLUENZA INFECTION: Primary | ICD-10-CM

## 2024-12-15 LAB
BACTERIA THROAT AEROBE CULT: ABNORMAL
BACTERIA THROAT AEROBE CULT: ABNORMAL
ORGANISM: ABNORMAL

## 2024-12-15 RX ORDER — SULFAMETHOXAZOLE AND TRIMETHOPRIM 800; 160 MG/1; MG/1
1 TABLET ORAL 2 TIMES DAILY
Qty: 10 TABLET | Refills: 0 | Status: SHIPPED | OUTPATIENT
Start: 2024-12-15 | End: 2024-12-20

## 2024-12-16 NOTE — TELEPHONE ENCOUNTER
Please let patient know his throat culture came back positive for parainfluenzae. I sent in a prescription for bactrim to take twice daily for 5 days.     Please document call and then close encounter.  thanks

## 2025-01-01 ENCOUNTER — PATIENT MESSAGE (OUTPATIENT)
Dept: FAMILY MEDICINE CLINIC | Age: 30
End: 2025-01-01

## 2025-01-02 ENCOUNTER — NURSE ONLY (OUTPATIENT)
Dept: FAMILY MEDICINE CLINIC | Age: 30
End: 2025-01-02
Payer: COMMERCIAL

## 2025-01-02 ENCOUNTER — TELEPHONE (OUTPATIENT)
Dept: FAMILY MEDICINE CLINIC | Age: 30
End: 2025-01-02

## 2025-01-02 DIAGNOSIS — R35.0 URINE FREQUENCY: Primary | ICD-10-CM

## 2025-01-02 LAB
BILIRUBIN, POC: NORMAL
BLOOD URINE, POC: NORMAL
CLARITY, POC: CLEAR
COLOR, POC: YELLOW
GLUCOSE URINE, POC: NORMAL MG/DL
KETONES, POC: NORMAL MG/DL
LEUKOCYTE EST, POC: NORMAL
NITRITE, POC: NORMAL
PH, POC: 6.5
PROTEIN, POC: NORMAL MG/DL
SPECIFIC GRAVITY, POC: 1.02
UROBILINOGEN, POC: NORMAL MG/DL

## 2025-01-02 PROCEDURE — 81003 URINALYSIS AUTO W/O SCOPE: CPT | Performed by: NURSE PRACTITIONER

## 2025-01-02 NOTE — TELEPHONE ENCOUNTER
In today for urine. States results are in chart, and has some questions regarding the results. Please call pt back at [phone]

## 2025-01-02 NOTE — TELEPHONE ENCOUNTER
I told patient his urine was clean and everything was normal that we are sending his urine out for a culture . Advised patient to drink plenty of water and to stay away from alcohol drinks and caffeine to see If that helps.

## 2025-01-03 LAB — BACTERIA UR CULT: NORMAL

## 2025-01-08 ENCOUNTER — OFFICE VISIT (OUTPATIENT)
Dept: FAMILY MEDICINE CLINIC | Age: 30
End: 2025-01-08

## 2025-01-08 ENCOUNTER — TELEPHONE (OUTPATIENT)
Dept: FAMILY MEDICINE CLINIC | Age: 30
End: 2025-01-08

## 2025-01-08 VITALS
HEART RATE: 86 BPM | BODY MASS INDEX: 30.21 KG/M2 | OXYGEN SATURATION: 98 % | HEIGHT: 69 IN | SYSTOLIC BLOOD PRESSURE: 122 MMHG | WEIGHT: 204 LBS | DIASTOLIC BLOOD PRESSURE: 86 MMHG

## 2025-01-08 DIAGNOSIS — R35.0 URINARY FREQUENCY: Primary | ICD-10-CM

## 2025-01-08 RX ORDER — TAMSULOSIN HYDROCHLORIDE 0.4 MG/1
0.4 CAPSULE ORAL DAILY
Qty: 90 CAPSULE | Refills: 1 | Status: SHIPPED | OUTPATIENT
Start: 2025-01-08

## 2025-01-08 RX ORDER — CEPHALEXIN 500 MG/1
500 CAPSULE ORAL 2 TIMES DAILY
Qty: 14 CAPSULE | Refills: 0 | Status: SHIPPED | OUTPATIENT
Start: 2025-01-08 | End: 2025-01-15

## 2025-01-08 SDOH — ECONOMIC STABILITY: FOOD INSECURITY: WITHIN THE PAST 12 MONTHS, YOU WORRIED THAT YOUR FOOD WOULD RUN OUT BEFORE YOU GOT MONEY TO BUY MORE.: NEVER TRUE

## 2025-01-08 SDOH — ECONOMIC STABILITY: FOOD INSECURITY: WITHIN THE PAST 12 MONTHS, THE FOOD YOU BOUGHT JUST DIDN'T LAST AND YOU DIDN'T HAVE MONEY TO GET MORE.: NEVER TRUE

## 2025-01-08 ASSESSMENT — PATIENT HEALTH QUESTIONNAIRE - PHQ9
1. LITTLE INTEREST OR PLEASURE IN DOING THINGS: NOT AT ALL
SUM OF ALL RESPONSES TO PHQ9 QUESTIONS 1 & 2: 0
SUM OF ALL RESPONSES TO PHQ QUESTIONS 1-9: 0
SUM OF ALL RESPONSES TO PHQ QUESTIONS 1-9: 0
2. FEELING DOWN, DEPRESSED OR HOPELESS: NOT AT ALL
SUM OF ALL RESPONSES TO PHQ QUESTIONS 1-9: 0
SUM OF ALL RESPONSES TO PHQ QUESTIONS 1-9: 0

## 2025-01-08 NOTE — TELEPHONE ENCOUNTER
GEMI- called [facility] [phone] for insurance verification. Automated system. Verified pt is eligible for the insurance and it has been effective since 1.1.25

## 2025-01-08 NOTE — PROGRESS NOTES
minutes reviewing previous notes, test results and face to face with the patient discussing the diagnosis and importance of compliance with the treatment plan as well as documenting on the day of the visit.      An electronic signature was used to authenticate this note.    --NADIYA Bryant - CNP

## 2025-01-13 ENCOUNTER — HOSPITAL ENCOUNTER (OUTPATIENT)
Dept: CT IMAGING | Age: 30
Discharge: HOME | End: 2025-01-13
Payer: COMMERCIAL

## 2025-01-13 DIAGNOSIS — R35.0 URINARY FREQUENCY: ICD-10-CM

## 2025-01-13 PROCEDURE — 74178 CT ABD&PLV WO CNTR FLWD CNTR: CPT | Performed by: NURSE PRACTITIONER

## 2025-01-13 PROCEDURE — 6360000004 HC RX CONTRAST MEDICATION: Performed by: NURSE PRACTITIONER

## 2025-01-13 RX ADMIN — IOMEPROL INJECTION 100 ML: 714 INJECTION, SOLUTION INTRAVASCULAR at 07:58

## 2025-01-17 ENCOUNTER — OFFICE VISIT (OUTPATIENT)
Dept: FAMILY MEDICINE CLINIC | Age: 30
End: 2025-01-17

## 2025-01-17 VITALS
OXYGEN SATURATION: 97 % | HEART RATE: 78 BPM | HEIGHT: 69 IN | WEIGHT: 200 LBS | BODY MASS INDEX: 29.62 KG/M2 | DIASTOLIC BLOOD PRESSURE: 80 MMHG | SYSTOLIC BLOOD PRESSURE: 136 MMHG

## 2025-01-17 DIAGNOSIS — N48.1 BALANITIS: Primary | ICD-10-CM

## 2025-01-17 LAB
BILIRUBIN, POC: NEGATIVE
BLOOD URINE, POC: NEGATIVE
CLARITY, POC: CLEAR
COLOR, POC: YELLOW
GLUCOSE URINE, POC: NEGATIVE MG/DL
KETONES, POC: NEGATIVE MG/DL
LEUKOCYTE EST, POC: NEGATIVE
NITRITE, POC: NEGATIVE
PH, POC: 6
PROTEIN, POC: NEGATIVE MG/DL
SPECIFIC GRAVITY, POC: 1.01
UROBILINOGEN, POC: 0.2 MG/DL

## 2025-01-17 RX ORDER — CEPHALEXIN 500 MG/1
500 CAPSULE ORAL 2 TIMES DAILY
Qty: 14 CAPSULE | Refills: 0 | Status: SHIPPED | OUTPATIENT
Start: 2025-01-17 | End: 2025-01-24

## 2025-01-17 RX ORDER — MICONAZOLE NITRATE 20 MG/G
CREAM TOPICAL
Qty: 28 G | Refills: 0 | Status: SHIPPED | OUTPATIENT
Start: 2025-01-17

## 2025-01-17 RX ORDER — PREDNISONE 10 MG/1
10 TABLET ORAL 2 TIMES DAILY
Qty: 10 TABLET | Refills: 0 | Status: SHIPPED | OUTPATIENT
Start: 2025-01-17 | End: 2025-01-22

## 2025-01-17 NOTE — PROGRESS NOTES
Jordan Tee (:  1995) is a 29 y.o. male,Established patient, here for evaluation of the following chief complaint(s):  genital burning         Assessment & Plan  Balanitis     Results for orders placed or performed in visit on 25   POCT Urinalysis no Micro   Result Value Ref Range    Color, UA yellow     Clarity, UA clear     Glucose, UA POC negative mg/dL    Bilirubin, UA negative     Ketones, UA negative mg/dL    Spec Grav, UA 1.015     Blood, UA POC negative     pH, UA 6.0     Protein, UA POC negative mg/dL    Urobilinogen, UA 0.2 mg/dL    Leukocytes, UA negative     Nitrite, UA negative        Orders:    Culture, Urine    C.trachomatis N.gonorrhoeae DNA, Urine ([place] Only)    cephALEXin (KEFLEX) 500 MG capsule; Take 1 capsule by mouth 2 times daily for 7 days    miconazole (MICOTIN) 2 % cream; Apply topically 2 times daily.    predniSONE (DELTASONE) 10 MG tablet; Take 1 tablet by mouth 2 times daily for 5 days    POCT Urinalysis no Micro      No follow-ups on file.       Subjective   HPI    Patient presents today for continued sx in his private area. States was seen last week for burning around the shaft of his penis and the tip of his penis. States it happens intermittently. States  to wife for 4 years - denies sexual activity outside of his marriage. Had urine dip, culture and bladder scan done in which all came back normal. States he has been trying to push fluids, Does drink caffeine and alcohol occasionally. States feels it could be more nerve compression or possibly changes in the fabric of his pants. States his wife recently changed detergents and wonders if that is the problem. Denies any urinary issues, penile discharge. States just on and off burning of the tip of his penis and slight lower abdominal pain.     Review of Systems   See HPI    Objective   Physical Exam  Vitals and nursing note reviewed.   Constitutional:       Appearance: Normal appearance.

## 2025-01-18 LAB — BACTERIA UR CULT: NORMAL

## 2025-01-20 LAB
C TRACH DNA UR QL NAA+PROBE: NEGATIVE
N GONORRHOEA DNA UR QL NAA+PROBE: NEGATIVE

## 2025-01-26 ENCOUNTER — PATIENT MESSAGE (OUTPATIENT)
Dept: FAMILY MEDICINE CLINIC | Age: 30
End: 2025-01-26

## 2025-01-26 DIAGNOSIS — N48.1 BALANITIS: ICD-10-CM

## 2025-01-27 RX ORDER — MICONAZOLE NITRATE 20 MG/G
CREAM TOPICAL
Qty: 28 G | Refills: 0 | Status: SHIPPED | OUTPATIENT
Start: 2025-01-27

## 2025-01-29 DIAGNOSIS — R39.89 BLADDER PAIN: Primary | ICD-10-CM

## 2025-02-14 ENCOUNTER — TELEPHONE (OUTPATIENT)
Dept: FAMILY MEDICINE CLINIC | Age: 30
End: 2025-02-14

## 2025-02-22 ENCOUNTER — PATIENT MESSAGE (OUTPATIENT)
Dept: FAMILY MEDICINE CLINIC | Age: 30
End: 2025-02-22

## 2025-02-22 DIAGNOSIS — K21.9 GASTROESOPHAGEAL REFLUX DISEASE WITHOUT ESOPHAGITIS: Primary | ICD-10-CM

## 2025-02-24 DIAGNOSIS — K21.9 GASTROESOPHAGEAL REFLUX DISEASE WITHOUT ESOPHAGITIS: ICD-10-CM

## 2025-02-25 RX ORDER — PANTOPRAZOLE SODIUM 20 MG/1
20 TABLET, DELAYED RELEASE ORAL DAILY
Qty: 90 TABLET | Refills: 3 | Status: SHIPPED | OUTPATIENT
Start: 2025-02-25

## 2025-02-25 RX ORDER — PANTOPRAZOLE SODIUM 20 MG/1
20 TABLET, DELAYED RELEASE ORAL DAILY
Qty: 90 TABLET | Refills: 3 | OUTPATIENT
Start: 2025-02-25

## 2025-02-28 RX ORDER — MONTELUKAST SODIUM 10 MG/1
10 TABLET ORAL DAILY
Qty: 90 TABLET | Refills: 1 | Status: SHIPPED | OUTPATIENT
Start: 2025-02-28

## 2025-03-25 ENCOUNTER — PATIENT MESSAGE (OUTPATIENT)
Dept: FAMILY MEDICINE CLINIC | Age: 30
End: 2025-03-25

## 2025-03-25 DIAGNOSIS — K21.9 GASTROESOPHAGEAL REFLUX DISEASE WITHOUT ESOPHAGITIS: Primary | ICD-10-CM

## 2025-03-26 DIAGNOSIS — K21.9 GASTROESOPHAGEAL REFLUX DISEASE WITHOUT ESOPHAGITIS: ICD-10-CM

## 2025-03-26 RX ORDER — PANTOPRAZOLE SODIUM 40 MG/1
40 TABLET, DELAYED RELEASE ORAL DAILY
Qty: 90 TABLET | Refills: 0 | OUTPATIENT
Start: 2025-03-26

## 2025-03-26 RX ORDER — PANTOPRAZOLE SODIUM 20 MG/1
40 TABLET, DELAYED RELEASE ORAL DAILY
Qty: 90 TABLET | Refills: 3 | Status: SHIPPED | OUTPATIENT
Start: 2025-03-26

## 2025-03-31 RX ORDER — PANTOPRAZOLE SODIUM 40 MG/1
40 TABLET, DELAYED RELEASE ORAL DAILY
Qty: 90 TABLET | Refills: 3 | Status: SHIPPED | OUTPATIENT
Start: 2025-03-31

## 2025-04-17 ENCOUNTER — OFFICE VISIT (OUTPATIENT)
Dept: FAMILY MEDICINE CLINIC | Age: 30
End: 2025-04-17

## 2025-04-17 VITALS
WEIGHT: 193 LBS | DIASTOLIC BLOOD PRESSURE: 88 MMHG | TEMPERATURE: 97.4 F | SYSTOLIC BLOOD PRESSURE: 136 MMHG | BODY MASS INDEX: 28.58 KG/M2 | HEIGHT: 69 IN | HEART RATE: 95 BPM | OXYGEN SATURATION: 98 %

## 2025-04-17 DIAGNOSIS — J01.90 ACUTE BACTERIAL SINUSITIS: Primary | ICD-10-CM

## 2025-04-17 DIAGNOSIS — B96.89 ACUTE BACTERIAL SINUSITIS: Primary | ICD-10-CM

## 2025-04-17 NOTE — PROGRESS NOTES
PROGRESS NOTE     Swathi Tolbert CNP  [facility]  [address] [place] [address], [address]  [facility] [address]  [phone] office  [phone] fax    Date of Service:  4/17/2025    Assessment / Plan:     1. Acute bacterial sinusitis  Recommend OTC covid test. Ok to start antibiotic for sinus infection. Notify office if not better.     - amoxicillin-clavulanate (AUGMENTIN) 875-125 MG per tablet; Take 1 tablet by mouth 2 times daily for 7 days  Dispense: 14 tablet; Refill: 0    Subjective:      Patient ID: .Jordan Tee is a 29 y.o. male      CC: Possible sinus infection    HPI  Respiratory Symptoms:  Patient complains of 2 day(s) history of fever: suspected but not measured, vertigo, ear congestion: bilaterally, purulent nasal discharge, facial pain/sinus pressure: bilateral maxillary, and sore throat.  Symptoms have been worsening with time. He denies any other symptoms .  Relevant PMH: No pertinent PMH. Smoking history:  He  reports that he has been smoking e-cigarettes. He has never used smokeless tobacco. He has had ill contacts with URI symptoms. Treatment to date: OTC cough suppressant.        Vitals:    04/17/25 1047   BP: 136/88   BP Site: Left Upper Arm   Patient Position: Sitting   BP Cuff Size: Large Adult   Pulse: 95   Temp: 97.4 °F (36.3 °C)   SpO2: 98%   Weight: 87.5 kg (193 lb)   Height: 1.753 m (5' 9\")       Outpatient Medications Marked as Taking for the 4/17/25 encounter (Office Visit) with Swathi Tolbert APRN - CNP   Medication Sig Dispense Refill    pantoprazole (PROTONIX) 40 MG tablet Take 1 tablet by mouth daily 90 tablet 3    montelukast (SINGULAIR) 10 MG tablet TAKE 1 TABLET BY MOUTH EVERY DAY 90 tablet 1    miconazole (MICOTIN) 2 % cream Apply topically 2 times daily. 28 g 0    tamsulosin (FLOMAX) 0.4 MG capsule Take 1 capsule by mouth daily 90 capsule 1    propranolol (INDERAL) 20 MG tablet TAKE 1 TABLET BY MOUTH TWICE A  tablet 1    Cetirizine-Pseudoephedrine (ZYRTEC-D

## 2025-06-06 DIAGNOSIS — F41.9 ANXIETY: ICD-10-CM

## 2025-06-06 RX ORDER — PROPRANOLOL HCL 20 MG
20 TABLET ORAL 2 TIMES DAILY
Qty: 180 TABLET | Refills: 3 | Status: SHIPPED | OUTPATIENT
Start: 2025-06-06

## 2025-06-06 RX ORDER — MONTELUKAST SODIUM 10 MG/1
10 TABLET ORAL DAILY
Qty: 90 TABLET | Refills: 3 | Status: SHIPPED | OUTPATIENT
Start: 2025-06-06

## 2025-07-14 DIAGNOSIS — K21.9 GASTROESOPHAGEAL REFLUX DISEASE WITHOUT ESOPHAGITIS: ICD-10-CM

## 2025-07-14 RX ORDER — PANTOPRAZOLE SODIUM 40 MG/1
40 TABLET, DELAYED RELEASE ORAL DAILY
Qty: 90 TABLET | Refills: 3 | Status: SHIPPED | OUTPATIENT
Start: 2025-07-14

## 2025-08-12 ENCOUNTER — OFFICE VISIT (OUTPATIENT)
Dept: FAMILY MEDICINE CLINIC | Age: 30
End: 2025-08-12
Payer: COMMERCIAL

## 2025-08-12 VITALS
BODY MASS INDEX: 29.77 KG/M2 | SYSTOLIC BLOOD PRESSURE: 138 MMHG | HEIGHT: 69 IN | WEIGHT: 201 LBS | HEART RATE: 68 BPM | DIASTOLIC BLOOD PRESSURE: 88 MMHG | OXYGEN SATURATION: 98 %

## 2025-08-12 DIAGNOSIS — R10.30 LOWER ABDOMINAL PAIN: ICD-10-CM

## 2025-08-12 DIAGNOSIS — K21.9 GASTROESOPHAGEAL REFLUX DISEASE WITHOUT ESOPHAGITIS: Primary | ICD-10-CM

## 2025-08-12 PROCEDURE — 99214 OFFICE O/P EST MOD 30 MIN: CPT | Performed by: NURSE PRACTITIONER

## 2025-08-12 RX ORDER — CALCIUM CARBONATE 500 MG/1
1 TABLET, CHEWABLE ORAL DAILY
Qty: 30 TABLET | Refills: 0 | Status: SHIPPED | OUTPATIENT
Start: 2025-08-12 | End: 2025-09-11

## 2025-08-12 RX ORDER — FAMOTIDINE 20 MG/1
20 TABLET, FILM COATED ORAL 2 TIMES DAILY
Qty: 60 TABLET | Refills: 3 | Status: SHIPPED | OUTPATIENT
Start: 2025-08-12

## 2025-08-12 RX ORDER — FLUTICASONE PROPIONATE 50 MCG
1 SPRAY, SUSPENSION (ML) NASAL DAILY PRN
COMMUNITY

## 2025-08-12 RX ORDER — AZELASTINE 1 MG/ML
1 SPRAY, METERED NASAL 2 TIMES DAILY
COMMUNITY

## 2025-08-12 SDOH — ECONOMIC STABILITY: FOOD INSECURITY: WITHIN THE PAST 12 MONTHS, THE FOOD YOU BOUGHT JUST DIDN'T LAST AND YOU DIDN'T HAVE MONEY TO GET MORE.: NEVER TRUE

## 2025-08-12 SDOH — ECONOMIC STABILITY: FOOD INSECURITY: WITHIN THE PAST 12 MONTHS, YOU WORRIED THAT YOUR FOOD WOULD RUN OUT BEFORE YOU GOT MONEY TO BUY MORE.: NEVER TRUE

## 2025-08-12 ASSESSMENT — PATIENT HEALTH QUESTIONNAIRE - PHQ9
1. LITTLE INTEREST OR PLEASURE IN DOING THINGS: NOT AT ALL
SUM OF ALL RESPONSES TO PHQ QUESTIONS 1-9: 0
2. FEELING DOWN, DEPRESSED OR HOPELESS: NOT AT ALL

## 2025-08-19 ENCOUNTER — OFFICE VISIT (OUTPATIENT)
Dept: FAMILY MEDICINE CLINIC | Age: 30
End: 2025-08-19
Payer: COMMERCIAL

## 2025-08-19 VITALS
BODY MASS INDEX: 29.77 KG/M2 | SYSTOLIC BLOOD PRESSURE: 114 MMHG | OXYGEN SATURATION: 99 % | HEART RATE: 78 BPM | DIASTOLIC BLOOD PRESSURE: 70 MMHG | WEIGHT: 201 LBS | HEIGHT: 69 IN

## 2025-08-19 DIAGNOSIS — K59.00 CONSTIPATION, UNSPECIFIED CONSTIPATION TYPE: Primary | ICD-10-CM

## 2025-08-19 PROCEDURE — 99212 OFFICE O/P EST SF 10 MIN: CPT | Performed by: NURSE PRACTITIONER

## 2025-08-19 SDOH — ECONOMIC STABILITY: FOOD INSECURITY: WITHIN THE PAST 12 MONTHS, THE FOOD YOU BOUGHT JUST DIDN'T LAST AND YOU DIDN'T HAVE MONEY TO GET MORE.: NEVER TRUE

## 2025-08-19 SDOH — ECONOMIC STABILITY: FOOD INSECURITY: WITHIN THE PAST 12 MONTHS, YOU WORRIED THAT YOUR FOOD WOULD RUN OUT BEFORE YOU GOT MONEY TO BUY MORE.: NEVER TRUE

## 2025-08-19 ASSESSMENT — PATIENT HEALTH QUESTIONNAIRE - PHQ9
2. FEELING DOWN, DEPRESSED OR HOPELESS: NOT AT ALL
SUM OF ALL RESPONSES TO PHQ QUESTIONS 1-9: 0
1. LITTLE INTEREST OR PLEASURE IN DOING THINGS: NOT AT ALL

## (undated) DEVICE — BITE BLOCK ENDOSCP AD 60 FR W/ ADJ STRP PLAS GRN BLOX

## (undated) DEVICE — FORMALIN CLEAR VIAL 20 ML 10%

## (undated) DEVICE — FORCEPS BX 240CM 2.4MM L NDL RAD JAW 4 M00513334

## (undated) DEVICE — ENDOSCOPY KIT: Brand: MEDLINE INDUSTRIES, INC.